# Patient Record
Sex: FEMALE | Race: WHITE | NOT HISPANIC OR LATINO | Employment: OTHER | URBAN - METROPOLITAN AREA
[De-identification: names, ages, dates, MRNs, and addresses within clinical notes are randomized per-mention and may not be internally consistent; named-entity substitution may affect disease eponyms.]

---

## 2022-08-23 ENCOUNTER — HOSPITAL ENCOUNTER (INPATIENT)
Facility: HOSPITAL | Age: 72
LOS: 8 days | Discharge: NURSING, REHAB OR LONG-TERM CARE | DRG: 481 | End: 2022-08-31
Attending: EMERGENCY MEDICINE | Admitting: ORTHOPAEDIC SURGERY
Payer: COMMERCIAL

## 2022-08-23 DIAGNOSIS — T14.90XA TRAUMA: ICD-10-CM

## 2022-08-23 DIAGNOSIS — S72.342A DISPLACED SPIRAL FRACTURE OF SHAFT OF LEFT FEMUR, INITIAL ENCOUNTER FOR CLOSED FRACTURE: ICD-10-CM
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
## 2022-08-23 DIAGNOSIS — S72.25XA CLOSED NONDISPLACED SUBTROCHANTERIC FRACTURE OF LEFT FEMUR, INITIAL ENCOUNTER: Primary | ICD-10-CM

## 2022-08-23 LAB
ALBUMIN SERPL-MCNC: 3.6 GM/DL (ref 3.4–4.8)
ALBUMIN/GLOB SERPL: 1 RATIO (ref 1.1–2)
ALP SERPL-CCNC: 94 UNIT/L (ref 40–150)
ALT SERPL-CCNC: 38 UNIT/L (ref 0–55)
AMPHET UR QL SCN: NEGATIVE
APPEARANCE UR: CLEAR
AST SERPL-CCNC: 41 UNIT/L (ref 5–34)
BACTERIA #/AREA URNS AUTO: NORMAL /HPF
BARBITURATE SCN PRESENT UR: NEGATIVE
BASOPHILS # BLD AUTO: 0.07 X10(3)/MCL (ref 0–0.2)
BASOPHILS NFR BLD AUTO: 0.5 %
BENZODIAZ UR QL SCN: POSITIVE
BILIRUB UR QL STRIP.AUTO: NEGATIVE MG/DL
BILIRUBIN DIRECT+TOT PNL SERPL-MCNC: 0.4 MG/DL
BUN SERPL-MCNC: 9 MG/DL (ref 9.8–20.1)
CALCIUM SERPL-MCNC: 9.5 MG/DL (ref 8.4–10.2)
CANNABINOIDS UR QL SCN: NEGATIVE
CHLORIDE SERPL-SCNC: 95 MMOL/L (ref 98–107)
CO2 SERPL-SCNC: 24 MMOL/L (ref 23–31)
COCAINE UR QL SCN: NEGATIVE
COLOR UR AUTO: YELLOW
CREAT SERPL-MCNC: 0.72 MG/DL (ref 0.55–1.02)
EOSINOPHIL # BLD AUTO: 0.24 X10(3)/MCL (ref 0–0.9)
EOSINOPHIL NFR BLD AUTO: 1.6 %
ERYTHROCYTE [DISTWIDTH] IN BLOOD BY AUTOMATED COUNT: 14 % (ref 11.5–17)
ETHANOL SERPL-MCNC: <10 MG/DL
FENTANYL UR QL SCN: POSITIVE
GFR SERPLBLD CREATININE-BSD FMLA CKD-EPI: >60 MLS/MIN/1.73/M2
GLOBULIN SER-MCNC: 3.5 GM/DL (ref 2.4–3.5)
GLUCOSE SERPL-MCNC: 178 MG/DL (ref 82–115)
GLUCOSE UR QL STRIP.AUTO: NEGATIVE MG/DL
HCT VFR BLD AUTO: 47.3 % (ref 37–47)
HGB BLD-MCNC: 15.2 GM/DL (ref 12–16)
IMM GRANULOCYTES # BLD AUTO: 0.1 X10(3)/MCL (ref 0–0.04)
IMM GRANULOCYTES NFR BLD AUTO: 0.7 %
KETONES UR QL STRIP.AUTO: NEGATIVE MG/DL
LEUKOCYTE ESTERASE UR QL STRIP.AUTO: ABNORMAL UNIT/L
LYMPHOCYTES # BLD AUTO: 3.18 X10(3)/MCL (ref 0.6–4.6)
LYMPHOCYTES NFR BLD AUTO: 21.5 %
MCH RBC QN AUTO: 32.3 PG (ref 27–31)
MCHC RBC AUTO-ENTMCNC: 32.1 MG/DL (ref 33–36)
MCV RBC AUTO: 100.4 FL (ref 80–94)
MDMA UR QL SCN: NEGATIVE
MONOCYTES # BLD AUTO: 0.76 X10(3)/MCL (ref 0.1–1.3)
MONOCYTES NFR BLD AUTO: 5.1 %
NEUTROPHILS # BLD AUTO: 10.4 X10(3)/MCL (ref 2.1–9.2)
NEUTROPHILS NFR BLD AUTO: 70.6 %
NITRITE UR QL STRIP.AUTO: NEGATIVE
NRBC BLD AUTO-RTO: 0 %
OPIATES UR QL SCN: NEGATIVE
PCP UR QL: NEGATIVE
PH UR STRIP.AUTO: 7 [PH]
PH UR: 7 [PH] (ref 3–11)
PLATELET # BLD AUTO: 290 X10(3)/MCL (ref 130–400)
PMV BLD AUTO: 10.4 FL (ref 7.4–10.4)
POTASSIUM SERPL-SCNC: 3.2 MMOL/L (ref 3.5–5.1)
PROT SERPL-MCNC: 7.1 GM/DL (ref 5.8–7.6)
PROT UR QL STRIP.AUTO: NEGATIVE MG/DL
RBC # BLD AUTO: 4.71 X10(6)/MCL (ref 4.2–5.4)
RBC #/AREA URNS AUTO: <5 /HPF
RBC UR QL AUTO: NEGATIVE UNIT/L
SODIUM SERPL-SCNC: 131 MMOL/L (ref 136–145)
SP GR UR STRIP.AUTO: 1.01 (ref 1–1.03)
SPECIFIC GRAVITY, URINE AUTO (.000) (OHS): 1.01 (ref 1–1.03)
SQUAMOUS #/AREA URNS AUTO: <5 /HPF
UROBILINOGEN UR STRIP-ACNC: 0.2 MG/DL
WBC # SPEC AUTO: 14.8 X10(3)/MCL (ref 4.5–11.5)
WBC #/AREA URNS AUTO: <5 /HPF

## 2022-08-23 PROCEDURE — 99223 1ST HOSP IP/OBS HIGH 75: CPT | Mod: ,,, | Performed by: ORTHOPAEDIC SURGERY

## 2022-08-23 PROCEDURE — 81001 URINALYSIS AUTO W/SCOPE: CPT | Performed by: EMERGENCY MEDICINE

## 2022-08-23 PROCEDURE — 25000003 PHARM REV CODE 250: Performed by: ORTHOPAEDIC SURGERY

## 2022-08-23 PROCEDURE — 85025 COMPLETE CBC W/AUTO DIFF WBC: CPT | Performed by: EMERGENCY MEDICINE

## 2022-08-23 PROCEDURE — 11000001 HC ACUTE MED/SURG PRIVATE ROOM

## 2022-08-23 PROCEDURE — 96374 THER/PROPH/DIAG INJ IV PUSH: CPT

## 2022-08-23 PROCEDURE — 63600175 PHARM REV CODE 636 W HCPCS: Performed by: EMERGENCY MEDICINE

## 2022-08-23 PROCEDURE — 25000003 PHARM REV CODE 250: Performed by: PHYSICIAN ASSISTANT

## 2022-08-23 PROCEDURE — 93005 ELECTROCARDIOGRAM TRACING: CPT

## 2022-08-23 PROCEDURE — 25000003 PHARM REV CODE 250: Performed by: EMERGENCY MEDICINE

## 2022-08-23 PROCEDURE — 99285 EMERGENCY DEPT VISIT HI MDM: CPT | Mod: 25

## 2022-08-23 PROCEDURE — 99223 PR INITIAL HOSPITAL CARE,LEVL III: ICD-10-PCS | Mod: ,,, | Performed by: ORTHOPAEDIC SURGERY

## 2022-08-23 PROCEDURE — 93010 EKG 12-LEAD: ICD-10-PCS | Mod: ,,, | Performed by: INTERNAL MEDICINE

## 2022-08-23 PROCEDURE — 82077 ASSAY SPEC XCP UR&BREATH IA: CPT | Performed by: PHYSICIAN ASSISTANT

## 2022-08-23 PROCEDURE — 80307 DRUG TEST PRSMV CHEM ANLYZR: CPT | Performed by: PHYSICIAN ASSISTANT

## 2022-08-23 PROCEDURE — 80053 COMPREHEN METABOLIC PANEL: CPT | Performed by: EMERGENCY MEDICINE

## 2022-08-23 PROCEDURE — 63600175 PHARM REV CODE 636 W HCPCS: Performed by: PHYSICIAN ASSISTANT

## 2022-08-23 PROCEDURE — 36415 COLL VENOUS BLD VENIPUNCTURE: CPT | Performed by: EMERGENCY MEDICINE

## 2022-08-23 PROCEDURE — 93010 ELECTROCARDIOGRAM REPORT: CPT | Mod: ,,, | Performed by: INTERNAL MEDICINE

## 2022-08-23 PROCEDURE — 96361 HYDRATE IV INFUSION ADD-ON: CPT

## 2022-08-23 RX ORDER — THIAMINE HCL 100 MG
100 TABLET ORAL DAILY
Status: DISCONTINUED | OUTPATIENT
Start: 2022-08-24 | End: 2022-08-31 | Stop reason: HOSPADM

## 2022-08-23 RX ORDER — AMLODIPINE BESYLATE 5 MG/1
10 TABLET ORAL DAILY
Status: DISCONTINUED | OUTPATIENT
Start: 2022-08-24 | End: 2022-08-25

## 2022-08-23 RX ORDER — CHLORDIAZEPOXIDE HYDROCHLORIDE 25 MG/1
25 CAPSULE, GELATIN COATED ORAL 2 TIMES DAILY
Status: COMPLETED | OUTPATIENT
Start: 2022-08-24 | End: 2022-08-25

## 2022-08-23 RX ORDER — AMLODIPINE BESYLATE 10 MG/1
10 TABLET ORAL DAILY
Status: ON HOLD | COMMUNITY
End: 2022-09-09 | Stop reason: SDUPTHER

## 2022-08-23 RX ORDER — ESCITALOPRAM OXALATE 10 MG/1
20 TABLET ORAL DAILY
Status: DISCONTINUED | OUTPATIENT
Start: 2022-08-24 | End: 2022-08-31 | Stop reason: HOSPADM

## 2022-08-23 RX ORDER — ALPRAZOLAM 0.5 MG/1
1 TABLET ORAL NIGHTLY PRN
Status: DISCONTINUED | OUTPATIENT
Start: 2022-08-23 | End: 2022-08-31 | Stop reason: HOSPADM

## 2022-08-23 RX ORDER — FOLIC ACID 1 MG/1
1 TABLET ORAL DAILY
Status: DISCONTINUED | OUTPATIENT
Start: 2022-08-24 | End: 2022-08-31 | Stop reason: HOSPADM

## 2022-08-23 RX ORDER — LORAZEPAM 2 MG/ML
1 INJECTION INTRAMUSCULAR
Status: DISCONTINUED | OUTPATIENT
Start: 2022-08-23 | End: 2022-08-31 | Stop reason: HOSPADM

## 2022-08-23 RX ORDER — CHLORDIAZEPOXIDE HYDROCHLORIDE 25 MG/1
25 CAPSULE, GELATIN COATED ORAL 3 TIMES DAILY
Status: DISPENSED | OUTPATIENT
Start: 2022-08-23 | End: 2022-08-24

## 2022-08-23 RX ORDER — MORPHINE SULFATE 4 MG/ML
1 INJECTION, SOLUTION INTRAMUSCULAR; INTRAVENOUS EVERY 6 HOURS PRN
Status: DISCONTINUED | OUTPATIENT
Start: 2022-08-23 | End: 2022-08-24

## 2022-08-23 RX ORDER — ESCITALOPRAM OXALATE 20 MG/1
20 TABLET ORAL DAILY
Status: ON HOLD | COMMUNITY
End: 2022-09-09 | Stop reason: SDUPTHER

## 2022-08-23 RX ORDER — CHLORDIAZEPOXIDE HYDROCHLORIDE 25 MG/1
25 CAPSULE, GELATIN COATED ORAL ONCE
Status: COMPLETED | OUTPATIENT
Start: 2022-08-25 | End: 2022-08-25

## 2022-08-23 RX ORDER — FENTANYL CITRATE 50 UG/ML
50 INJECTION, SOLUTION INTRAMUSCULAR; INTRAVENOUS
Status: COMPLETED | OUTPATIENT
Start: 2022-08-23 | End: 2022-08-23

## 2022-08-23 RX ORDER — LORAZEPAM 2 MG/ML
1 INJECTION INTRAMUSCULAR ONCE
Status: DISCONTINUED | OUTPATIENT
Start: 2022-08-23 | End: 2022-08-23

## 2022-08-23 RX ORDER — SODIUM CHLORIDE, SODIUM LACTATE, POTASSIUM CHLORIDE, CALCIUM CHLORIDE 600; 310; 30; 20 MG/100ML; MG/100ML; MG/100ML; MG/100ML
INJECTION, SOLUTION INTRAVENOUS CONTINUOUS
Status: DISCONTINUED | OUTPATIENT
Start: 2022-08-23 | End: 2022-08-23

## 2022-08-23 RX ORDER — BENAZEPRIL HYDROCHLORIDE AND HYDROCHLOROTHIAZIDE 10; 12.5 MG/1; MG/1
1 TABLET ORAL DAILY
Status: ON HOLD | COMMUNITY
End: 2022-09-09 | Stop reason: SDUPTHER

## 2022-08-23 RX ORDER — POTASSIUM CHLORIDE 14.9 MG/ML
20 INJECTION INTRAVENOUS ONCE
Status: COMPLETED | OUTPATIENT
Start: 2022-08-23 | End: 2022-08-23

## 2022-08-23 RX ORDER — ALPRAZOLAM 1 MG/1
TABLET, EXTENDED RELEASE ORAL DAILY PRN
Status: ON HOLD | COMMUNITY
End: 2022-09-09 | Stop reason: SDUPTHER

## 2022-08-23 RX ORDER — HYDROCODONE BITARTRATE AND ACETAMINOPHEN 5; 325 MG/1; MG/1
1 TABLET ORAL EVERY 6 HOURS PRN
Status: DISCONTINUED | OUTPATIENT
Start: 2022-08-23 | End: 2022-08-24

## 2022-08-23 RX ADMIN — HYDROCODONE BITARTRATE AND ACETAMINOPHEN 1 TABLET: 5; 325 TABLET ORAL at 09:08

## 2022-08-23 RX ADMIN — FOLIC ACID: 5 INJECTION, SOLUTION INTRAMUSCULAR; INTRAVENOUS; SUBCUTANEOUS at 03:08

## 2022-08-23 RX ADMIN — POTASSIUM CHLORIDE 20 MEQ: 14.9 INJECTION, SOLUTION INTRAVENOUS at 03:08

## 2022-08-23 RX ADMIN — MORPHINE SULFATE 1 MG: 4 INJECTION INTRAVENOUS at 04:08

## 2022-08-23 RX ADMIN — SODIUM CHLORIDE 1000 ML: 9 INJECTION, SOLUTION INTRAVENOUS at 12:08

## 2022-08-23 RX ADMIN — CHLORDIAZEPOXIDE HYDROCHLORIDE 25 MG: 25 CAPSULE ORAL at 03:08

## 2022-08-23 RX ADMIN — CHLORDIAZEPOXIDE HYDROCHLORIDE 25 MG: 25 CAPSULE ORAL at 09:08

## 2022-08-23 RX ADMIN — FENTANYL CITRATE 50 MCG: 50 INJECTION, SOLUTION INTRAMUSCULAR; INTRAVENOUS at 01:08

## 2022-08-23 NOTE — H&P
.  [facility]  Orthopedics  Consult Note    Patient Name: Mary Lou Middleton  MRN: [id number]  Admission Date: 8/23/2022  Hospital Length of Stay: 0 days  Attending Provider: Maicol Spain Jr., MD  Primary Care Provider: Rosalinda Torres NP        Consults  Subjective:     Principal Problem:<principal problem not specified>    Chief Complaint:   Chief Complaint   Patient presents with    Fall     Pt to ER via [facility] for fall.  Down 4 stairs.  Left femur deformity.  -LOC, -thinners, GCS 15        HPI: She is a pleasant 72yo who presents with left leg pain after a fall at home. She fell down stairs outside. She was able to get inside and call for an ambulance. She complains of only left leg pain. It is worse with any activity. It is somewhat better with rest. Orthopedics admitted for management of her left femur fracture.       Past Medical History:   Diagnosis Date    Hypertension        History reviewed. No pertinent surgical history.    Review of patient's allergies indicates:  No Known Allergies    Current Facility-Administered Medications   Medication    chlordiazepoxide capsule 25 mg    [START ON 8/24/2022] chlordiazepoxide capsule 25 mg    [START ON 8/25/2022] chlordiazepoxide capsule 25 mg    [START ON 8/24/2022] folic acid tablet 1 mg    lorazepam injection 1 mg    morphine injection 1 mg    [START ON 8/24/2022] multivitamin tablet    [START ON 8/24/2022] thiamine tablet 100 mg     Family History    None       Tobacco Use    Smoking status: Not on file    Smokeless tobacco: Not on file   Substance and Sexual Activity    Alcohol use: Not on file    Drug use: Not on file    Sexual activity: Not on file     ROS  Objective:     Vital Signs (Most Recent):  Temp: 98.6 °F (37 °C) (08/23/22 1801)  Pulse: 78 (08/23/22 1801)  Resp: (!) 22 (08/23/22 1801)  BP: (!) 165/78 (08/23/22 1801)  SpO2: 99 % (08/23/22 1801) Vital Signs (24h Range):  Temp:  [98.1 °F (36.7 °C)-98.6 °F (37 °C)]  "98.6 °F (37 °C)  Pulse:  [74-91] 78  Resp:  [12-26] 22  SpO2:  [97 %-100 %] 99 %  BP: (133-165)/(56-86) 165/78     Weight: 72.6 kg (160 lb)  Height: 5' 5" (165.1 cm)  Body mass index is 26.63 kg/m².    No intake or output data in the 24 hours ending 08/23/22 1855    Ortho/SPM Exam  Musculoskeletal:   Neck: no pain with range of motion, no tenderness to palpation  Right upper extremity: no swelling; no deformity; no open wounds; compartments are soft and compressible; no pain with ROM at the shoulder, elbow, wrist, or digits, no tenderness to palpation; AIN/PIN/Ulnar motor intact; SILT distally;BCR distally; Radial pulse 2+  Left upper extremity: no swelling; no deformity; no open wounds; compartments are soft and compressible; no pain with ROM at the shoulder, elbow, wrist, or digits, no tenderness to palpation; AIN/PIN/Ulnar motor intact; SILT distally;BCR distally; Radial pulse 2+  Right lower extremity: no swelling; no deformity; no open wounds; compartments are soft and compressible; No pain with ROM at the hip, knee, or ankle; no tenderness to palpation; dorsi/plantar flexes the foot; SILT distally; BCR distally; DP pulse 2+  Left lower extremity: knee immobilizer in place. Compartments soft, compressible. SILT. +2 DP pulse. Motor intact.      Significant Labs: All pertinent labs within the past 24 hours have been reviewed.  None  Recent Lab Results       08/23/22  1516   08/23/22  1515   08/23/22  1308        Phencyclidine   Negative         Albumin/Globulin Ratio     1.0       Albumin     3.6       Alcohol, Serum     <10.0       Alkaline Phosphatase     94       ALT     38       Amphetamine Screen, Ur   Negative         Appearance, UA Clear           AST     41       Bacteria, UA None Seen           Barbiturate Screen, Ur   Negative         Baso #     0.07       Basophil %     0.5       Benzodiazepine Screen, Urine   Positive         BILIRUBIN TOTAL     0.4       Bilirubin, UA Negative           BUN     9.0   "     Calcium     9.5       Cannabinoids, Urine   Negative         Chloride     95       CO2     24       Cocaine (Metab.)   Negative         Color, UA Yellow           Creatinine     0.72       eGFR     >60       Eos #     0.24       Eosinophil %     1.6       Fentanyl, Urine   Positive         Globulin, Total     3.5       Glucose     178       Glucose, UA Negative           Hematocrit     47.3       Hemoglobin     15.2       Immature Grans (Abs)     0.10       Immature Granulocytes     0.7       Ketones, UA Negative           Leukocytes, UA Trace           Lymph #     3.18       LYMPH %     21.5       MCH     32.3       MCHC     32.1       MCV     100.4       MDMA, Urine   Negative         Mono #     0.76       Mono %     5.1       MPV     10.4       Neut #     10.4       Neut %     70.6       NITRITE UA Negative           nRBC     0.0       Occult Blood UA Negative           Opiate Scrn, Ur   Negative         pH, UA 7.0           pH, Urine   7.0         Platelets     290       Potassium     3.2       PROTEIN TOTAL     7.1       Protein, UA Negative           RBC     4.71       RBC, UA <5           RDW     14.0       Sodium     131       Specific Gravity,UA 1.010           Specific Gravity, Urine Auto   1.010         Squam Epithel, UA <5           Urobilinogen, UA 0.2           WBC, UA <5           WBC     14.8              Significant Imaging:   Result Date: 8/23/2022  EXAMINATION: XR FEMUR 2 VIEW LEFT CLINICAL HISTORY: Unspecified fall, initial encounter TECHNIQUE: AP and lateral views of the left femur COMPARISON: None} FINDINGS: Spiral comminuted fracture of the mid to distal left femoral shaft.  Distal shaft is displaced posteromedially by about 1 shaft width.  Left hip and knee grossly aligned.      Comminuted left femoral shaft fracture.    Assessment/Plan:     Left midshaft femur fracture with nondisplaced extension into the supracondylar region    I have recommended surgical intervention:  Intramedullary  nailing/over reduction of excision of the left femur.  We discussed the details of the procedure and expected postoperative course.  We discussed the benefits of surgery which be to stabilize the fracture.  We discussed the risks of surgery which are small but could be significant if she has a malunion, nonunion, pain, stiffness, or infection.  After discussion she would like to proceed.  Plans for surgery 08/24/2022 with myself or with Dr. Vaughn pending earlier OR availability.            Maicol Spain Jr, MD  Orthopedic Surgery and Sports Medicine  [facility]

## 2022-08-23 NOTE — CONSULTS
[facility]  Orthopedics  Consult Note    Patient Name: Mary Lou Middleton  MRN: [id number]  Admission Date: (Not on file)  Hospital Length of Stay: 0 days  Attending Provider: No att. providers found  Primary Care Provider: Rosalinda Torres NP        Consults  Subjective:         Chief Complaint: left leg pain     HPI: She is a pleasant 70yo who presents with left leg pain after a fall at home. She fell down stairs outside. She was able to get inside and call for an ambulance. She complains of only left leg pain. It is worse with any activity. It is somewhat better with rest. Orthopedics was consulted for management of her left femur fracture.    Past Medical History:   Diagnosis Date    Hypertension        No past surgical history on file.    Review of patient's allergies indicates:  No Known Allergies    No current facility-administered medications for this visit.     No current outpatient medications on file.     Facility-Administered Medications Ordered in Other Visits   Medication    chlordiazepoxide capsule 25 mg    [START ON 8/24/2022] chlordiazepoxide capsule 25 mg    [START ON 8/25/2022] chlordiazepoxide capsule 25 mg    [START ON 8/24/2022] folic acid tablet 1 mg    lorazepam injection 1 mg    morphine injection 1 mg    [START ON 8/24/2022] multivitamin tablet    [START ON 8/24/2022] thiamine tablet 100 mg     Family History    None       Tobacco Use    Smoking status: Not on file    Smokeless tobacco: Not on file   Substance and Sexual Activity    Alcohol use: Not on file    Drug use: Not on file    Sexual activity: Not on file     ROS  Objective:     Vital Signs (Most Recent):    Vital Signs (24h Range):  [unfilled]           There is no height or weight on file to calculate BMI.    [unfilled]    Ortho/SPM Exam  Musculoskeletal:     Left lower extremity: knee immobilizer in place. Compartments soft, compressible. SILT. +2 DP pulse. Motor intact.      Significant Labs: All  pertinent labs within the past 24 hours have been reviewed.  Recent Lab Results         08/23/22  1308        Albumin/Globulin Ratio 1.0       Albumin 3.6       Alcohol, Serum <10.0       Alkaline Phosphatase 94       ALT 38       AST 41       Baso # 0.07       Basophil % 0.5       BILIRUBIN TOTAL 0.4       BUN 9.0       Calcium 9.5       Chloride 95       CO2 24       Creatinine 0.72       eGFR >60       Eos # 0.24       Eosinophil % 1.6       Globulin, Total 3.5       Glucose 178       Hematocrit 47.3       Hemoglobin 15.2       Immature Grans (Abs) 0.10       Immature Granulocytes 0.7       Lymph # 3.18       LYMPH % 21.5       MCH 32.3       MCHC 32.1       .4       Mono # 0.76       Mono % 5.1       MPV 10.4       Neut # 10.4       Neut % 70.6       nRBC 0.0       Platelets 290       Potassium 3.2       PROTEIN TOTAL 7.1       RBC 4.71       RDW 14.0       Sodium 131       WBC 14.8                Significant Imaging: I have reviewed and interpreted all pertinent imaging results/findings.  X-Ray Chest 1 View    Result Date: 8/23/2022  EXAMINATION: XR CHEST 1 VIEW CLINICAL HISTORY: Injury, unspecified, initial encounter TECHNIQUE: Single view of the chest COMPARISON: No prior imaging available for comparison. FINDINGS: No focal opacification, pleural effusion, or pneumothorax. The cardiomediastinal silhouette is within normal limits. No acute osseous abnormality.     No acute cardiopulmonary process. Electronically signed by: Jamshid Matos Date:    08/23/2022 Time:    13:36    X-Ray Femur Ap/Lat Left    Result Date: 8/23/2022  EXAMINATION: XR FEMUR 2 VIEW LEFT CLINICAL HISTORY: Unspecified fall, initial encounter TECHNIQUE: AP and lateral views of the left femur COMPARISON: None} FINDINGS: Spiral comminuted fracture of the mid to distal left femoral shaft.  Distal shaft is displaced posteromedially by about 1 shaft width.  Left hip and knee grossly aligned.     Comminuted left femoral shaft fracture.  Electronically signed by: Saad Wagoner Date:    08/23/2022 Time:    14:25    CT Knee Without Contrast Left    Result Date: 8/23/2022  EXAMINATION: CT KNEE WITHOUT CONTRAST LEFT CLINICAL HISTORY: Fracture, knee; TECHNIQUE: Helical acquisition through the left knee without IV contrast three plane reconstructions were provided for review.  mGycm. Automatic exposure control, adjustment of mA/kV or iterative reconstruction technique was used to reduce radiation. COMPARISON: Radiographs earlier today FINDINGS: There is partially imaged comminuted and displaced fracture of the distal femoral metadiaphysis.  Nondisplaced portion of the fracture may extend in the far anteromedial aspect of the knee joint on image 17 series 12.  The patella, tibia and fibula are intact.  There are degenerative changes of the knee with osteophytosis and mild joint space narrowing in the medial compartment.  There is a small joint effusion.     As above. Electronically signed by: Reg Rodriges Date:    08/23/2022 Time:    17:10       Assessment/Plan:   Left midshaft femur fracture with nondisplaced extension into the supracondylar region    I have recommended surgical intervention:  Intramedullary nailing/over reduction of excision of the left femur.  We discussed the details of the procedure and expected postoperative course.  We discussed the benefits of surgery which be to stabilize the fracture.  We discussed the risks of surgery which are small but could be significant if she has a malunion, nonunion, pain, stiffness, or infection.  After discussion she would like to proceed.  Plans for surgery 08/24/2022 with myself or with Dr. Vaughn pending earlier OR availability.

## 2022-08-23 NOTE — ED TRIAGE NOTES
(Pt to ER via [facility] for fall. Down 4 stairs. Left femur deformity, shortened, place in knee immobilizer   -LOC, -thinners, GCS 15

## 2022-08-23 NOTE — ED PROVIDER NOTES
Encounter Date: 8/23/2022    SCRIBE #1 NOTE: I, Stephanie You, am scribing for, and in the presence of,  Leonidas Hadley III, MD . I have scribed the following portions of the note - Other sections scribed: HPI, ROS, PE.       History     Chief Complaint   Patient presents with    Fall     Pt to ER via [facility] for fall.  Down 4 stairs.  Left femur deformity.  -LOC, -thinners, GCS 15     71 year old female with a history of HTN presents to ED, via EMS, after a fall.  Pt reports slipping on wet steps and is complaining of left lateral thigh pain.  Pt denies any HA, neck pain, CP, abdominal pain, or N/V/D.  Pt is not on blood thinners.  No loss of conscious no preceding event      Fall  The accident occurred just prior to arrival. The fall occurred while walking. She fell from a height of 3 to 5 ft. She landed on concrete. There was no blood loss. She was not ambulatory at the scene. Pertinent negatives include no neck pain, no back pain, no fever, no abdominal pain, no nausea, no vomiting and no loss of consciousness.     Review of patient's allergies indicates:  No Known Allergies  Past Medical History:   Diagnosis Date    Hypertension      History reviewed. No pertinent surgical history.  History reviewed. No pertinent family history.     Review of Systems   Constitutional: Negative for fatigue, fever and unexpected weight change.   HENT: Negative for congestion and rhinorrhea.    Eyes: Negative for pain.   Respiratory: Negative for chest tightness, shortness of breath and wheezing.    Cardiovascular: Negative for chest pain.   Gastrointestinal: Negative for abdominal pain, constipation, diarrhea, nausea and vomiting.   Genitourinary: Negative for dysuria.   Musculoskeletal: Negative for back pain and neck pain.        Left lateral thigh pain   Skin: Negative for rash.   Allergic/Immunologic: Negative for environmental allergies, food allergies and immunocompromised state.   Neurological: Negative for dizziness,  loss of consciousness and speech difficulty.   Hematological: Does not bruise/bleed easily.   Psychiatric/Behavioral: Negative for sleep disturbance and suicidal ideas.       Physical Exam     Initial Vitals [08/23/22 1225]   BP Pulse Resp Temp SpO2   (!) 145/56 90 12 98.1 °F (36.7 °C) 99 %      MAP       --         Physical Exam    Nursing note and vitals reviewed.  Constitutional: No distress.   HENT:   Head: Normocephalic and atraumatic.   Neck: Trachea normal.   Cardiovascular: Normal rate and regular rhythm.   No murmur heard.  Pulmonary/Chest: Breath sounds normal. No respiratory distress.   Abdominal: Abdomen is soft. Bowel sounds are normal. She exhibits no distension. There is no abdominal tenderness.   Musculoskeletal:         General: Normal range of motion.      Lumbar back: Normal range of motion.      Comments: Left lateral thigh ecchymosis; wrapped in an ace wrap; neurovascularly intact     Neurological: She is alert and oriented to person, place, and time. She has normal strength. No cranial nerve deficit.   Skin: Skin is warm and dry. No rash noted.   Psychiatric: She has a normal mood and affect. Judgment normal.         ED Course   Procedures  Labs Reviewed   COMPREHENSIVE METABOLIC PANEL - Abnormal; Notable for the following components:       Result Value    Sodium Level 131 (*)     Potassium Level 3.2 (*)     Chloride 95 (*)     Glucose Level 178 (*)     Blood Urea Nitrogen 9.0 (*)     Albumin/Globulin Ratio 1.0 (*)     Aspartate Aminotransferase 41 (*)     All other components within normal limits   CBC WITH DIFFERENTIAL - Abnormal; Notable for the following components:    WBC 14.8 (*)     Hct 47.3 (*)     .4 (*)     MCH 32.3 (*)     MCHC 32.1 (*)     Neut # 10.4 (*)     IG# 0.10 (*)     All other components within normal limits   CBC W/ AUTO DIFFERENTIAL    Narrative:     The following orders were created for panel order CBC Auto Differential.  Procedure                                Abnormality         Status                     ---------                               -----------         ------                     CBC with Differential[[id number]]        Abnormal            Final result                 Please view results for these tests on the individual orders.   URINALYSIS, REFLEX TO URINE CULTURE   ALCOHOL,MEDICAL (ETHANOL)   DRUG SCREEN, URINE (BEAKER)     EKG Readings: (Independently Interpreted)   Rhythm: Normal Sinus Rhythm. Heart Rate: 81. Ectopy: No Ectopy. Conduction: Normal. ST Segments: Non-Specific ST Segment Depression. T Waves: Normal. Clinical Impression: Normal Sinus Rhythm   1302       Imaging Results          X-Ray Femur Ap/Lat Left (Final result)  Result time 08/23/22 14:25:41    Final result by Saad Wagoner MD (08/23/22 14:25:41)                 Impression:      Comminuted left femoral shaft fracture.      Electronically signed by: Saad Wagoner  Date:    08/23/2022  Time:    14:25             Narrative:    EXAMINATION:  XR FEMUR 2 VIEW LEFT    CLINICAL HISTORY:  Unspecified fall, initial encounter    TECHNIQUE:  AP and lateral views of the left femur    COMPARISON:  None}    FINDINGS:  Spiral comminuted fracture of the mid to distal left femoral shaft.  Distal shaft is displaced posteromedially by about 1 shaft width.  Left hip and knee grossly aligned.                               X-Ray Chest 1 View (Final result)  Result time 08/23/22 13:36:42    Final result by Jamshid Matos MD (08/23/22 13:36:42)                 Impression:      No acute cardiopulmonary process.      Electronically signed by: Jamshid Matos  Date:    08/23/2022  Time:    13:36             Narrative:    EXAMINATION:  XR CHEST 1 VIEW    CLINICAL HISTORY:  Injury, unspecified, initial encounter    TECHNIQUE:  Single view of the chest    COMPARISON:  No prior imaging available for comparison.    FINDINGS:  No focal opacification, pleural effusion, or pneumothorax.    The cardiomediastinal  silhouette is within normal limits.    No acute osseous abnormality.                                 Medications   potassium chloride 20 mEq in 100 mL IVPB (FOR CENTRAL LINE ADMINISTRATION ONLY) (has no administration in time range)   morphine injection 1 mg (has no administration in time range)   lorazepam injection 1 mg (has no administration in time range)   sodium chloride 0.9% 1,000 mL with mvi, adult no.4 with vit K 3,300 unit- 150 mcg/10 mL 10 mL, thiamine 100 mg, folic acid 1 mg infusion (has no administration in time range)   multivitamin tablet (has no administration in time range)   folic acid tablet 1 mg (has no administration in time range)   thiamine tablet 100 mg (has no administration in time range)   sodium chloride 0.9% bolus 1,000 mL (0 mLs Intravenous Stopped 8/23/22 1345)   fentaNYL injection 50 mcg (50 mcg Intravenous Given 8/23/22 1330)     Medical Decision Making:   Clinical Tests:   Lab Tests: Ordered and Reviewed  Radiological Study: Reviewed and Ordered  ED Management:  Midshaft left femur fracture while maintaining traction in knee immobilizer was placed around the thigh for stabilization neurovascular intact            Attending Attestation:           Physician Attestation for Scribe:  Physician Attestation Statement for Scribe #1: I, Leonidas Hadley III, MD , reviewed documentation, as scribed by Stephanie You in my presence, and it is both accurate and complete.                      Clinical Impression:   Final diagnoses:  [W19.XXXA] Fall  [T14.90XA] Trauma  [S72.25XA] Closed nondisplaced subtrochanteric fracture of left femur, initial encounter (Primary)          ED Disposition Condition    Admit               Leonidas Hadley III, MD  08/23/22 7260

## 2022-08-23 NOTE — CONSULTS
"[facility]  Hospital Medicine Consultation Note      Patient Name: Mary Lou Middleton  MRN: [id number]  Patient Class: IP- Inpatient   Admission Date: 8/23/2022   Admitting Physician: Dr. Spain  Length of Stay: 0  Primary Care Provider: Rosalinda Torres NP  Consulting physician: Dr. Urbina  Face-to-Face encounter date: 08/23/2022  Code Status:  DNR  Chief Complaint: Fall  Reason for consultation:  Medical management      Patient information was obtained from patient, patient's family, past medical records and ER records.     HISTORY OF PRESENT ILLNESS:   Mary Lou Middleton is a 71 y.o. female with a past medical history of hypertension and anxiety who presented to [facility] on 8/23/2022 via EMS with complaints of left lower extremity mostly in the lateral thigh that occurred after she slipped and fell down her wet stairs which is comprised of five steps. She was home alone during incident, but was able to "scoot" herself to a phone in order to call EMS.  She denied head injury or loss of consciousness. Upon arrival in the ED, she underwent an x-ray of her left femur that showed a midshaft fracture.  She was admitted to Orthopedic Services for likely surgical intervention. Hospital Medicine was consulted for medical management. She admits to smoking a pack of cigarettes daily and also drinks x3-4 glasses of liquor daily with the last consumption around 2000 last night on 08/22/2022.  She denies any previous history of alcohol withdrawal.  I spoke to the patient about code status extensively and she requested to be a DNR.    Her vital signs are currently stable though she was hypertensive /70.  Her labs showed a WBC 14.8, a macrocytic anemia with a stable H&H, sodium 131, potassium 3.2, chloride 95, and glucose 178. CXR showed no acute findings.  PAST MEDICAL HISTORY:   Hypertension   Anxiety/depression    PAST SURGICAL HISTORY:   Tonsillectomy    ALLERGIES:   Patient has no known " allergies.    FAMILY HISTORY:   Reviewed and negative    SOCIAL HISTORY:   Tobacco-a pack of cigarettes daily  Alcohol-x 3-4 glasses of liquor daily  Illicit Drugs- none  HOME MEDICATIONS:     Prior to Admission medications    Not on File       REVIEW OF SYSTEMS:   Except as documented, all other systems reviewed and negative     PHYSICAL EXAM:     VITAL SIGNS: 24 HRS MIN & MAX LAST   Temp  Min: 98.1 °F (36.7 °C)  Max: 98.1 °F (36.7 °C) 98.1 °F (36.7 °C)   BP  Min: 133/74  Max: 160/70 133/74   Pulse  Min: 77  Max: 91  78   Resp  Min: 12  Max: 26 (!) 25   SpO2  Min: 99 %  Max: 100 % 100 %   Vital signs reviewed.    General: well-developed well-nourished in no acute distress  Eye: clear conjunctiva, eyelids normal  HENT: oropharynx and nasal mucosal surfaces moist  Neck: full range of motion  Respiratory: clear to auscultation bilaterally  Cardiovascular: regular rate and rhythm without murmurs, gallops or rubs  Gastrointestinal: soft, non-tender, non-distended with normal bowel sounds, without masses to palpation  Musculoskeletal: FROM of LUE, RUE, and RLE with full strength, limited examination of LLE since it is currently in a knee immobilizer  Integumentary: warm, dry  Neurologic: cranial nerves intact    LABS AND IMAGING:     Recent Labs   Lab 08/23/22  1308   WBC 14.8*   RBC 4.71   HGB 15.2   HCT 47.3*   .4*   MCH 32.3*   MCHC 32.1*   RDW 14.0      MPV 10.4       Recent Labs   Lab 08/23/22  1308   *   K 3.2*   CO2 24   BUN 9.0*   CREATININE 0.72   CALCIUM 9.5   ALBUMIN 3.6   ALKPHOS 94   ALT 38   AST 41*   BILITOT 0.4       Microbiology Results (last 7 days)     ** No results found for the last 168 hours. **           X-Ray Femur Ap/Lat Left  Narrative: EXAMINATION:  XR FEMUR 2 VIEW LEFT    CLINICAL HISTORY:  Unspecified fall, initial encounter    TECHNIQUE:  AP and lateral views of the left femur    COMPARISON:  None}    FINDINGS:  Spiral comminuted fracture of the mid to distal left femoral  shaft.  Distal shaft is displaced posteromedially by about 1 shaft width.  Left hip and knee grossly aligned.  Impression: Comminuted left femoral shaft fracture.    Electronically signed by: Saad Wagoner  Date:    08/23/2022  Time:    14:25  X-Ray Chest 1 View  Narrative: EXAMINATION:  XR CHEST 1 VIEW    CLINICAL HISTORY:  Injury, unspecified, initial encounter    TECHNIQUE:  Single view of the chest    COMPARISON:  No prior imaging available for comparison.    FINDINGS:  No focal opacification, pleural effusion, or pneumothorax.    The cardiomediastinal silhouette is within normal limits.    No acute osseous abnormality.  Impression: No acute cardiopulmonary process.    Electronically signed by: Jamshid Matos  Date:    08/23/2022  Time:    13:36        ASSESSMENT & PLAN:   Comminuted left femoral shaft fracture  Hypokalemia/hyponatremia  Alcohol abuse   Tobacco abuse  History of hypertension   History of anxiety    Plan:  -orthopedics are the primary so continue with their recommendations  -keep npo for now until evaluated by orthopedics for possible surgery today versus tomorrow  -oxygen supplementation as needed  -control pain and nausea PRN  -x-ray left femur currently pending  -UA, ethanol, UDS-pending  -KCL 20 mEq x1 dose  -continue with IV hydration-gave a banana bag x1 for history of alcohol abuse  -continue with thiamine, folate, and multivitamin daily tomorrow  -Librium protocol  -Ativan 1mg IV PRN for symptoms of alcohol withdrawal  -CIWA  Q4 hour  -Repeat labs in am    VTE Prophylaxis: will be placed on SCDs for DVT prophylaxis and will be advised to be as mobile as possible and sit in a chair as tolerated  __________________________________________________________________________  INPATIENT LIST OF MEDICATIONS     Current Facility-Administered Medications:     [START ON 8/24/2022] folic acid tablet 1 mg, 1 mg, Oral, Daily, SHAYNE Hill    lorazepam injection 1 mg, 1 mg, Intravenous, PRN,  SHAYNE Hill    morphine injection 1 mg, 1 mg, Intravenous, Q6H PRN, SHAYNE Hill    [START ON 8/24/2022] multivitamin tablet, 1 tablet, Oral, Daily, SHAYNE Hill    potassium chloride 20 mEq in 100 mL IVPB (FOR CENTRAL LINE ADMINISTRATION ONLY), 20 mEq, Intravenous, Once, SHAYNE Hill    sodium chloride 0.9% 1,000 mL with mvi, adult no.4 with vit K 3,300 unit- 150 mcg/10 mL 10 mL, thiamine 100 mg, folic acid 1 mg infusion, , Intravenous, Once, SHAYNE Hlil    [START ON 8/24/2022] thiamine tablet 100 mg, 100 mg, Oral, Daily, SHAYNE Hill  No current outpatient medications on file.      Scheduled Meds:  Continuous Infusions:  PRN Meds:.      Discharge Planning and Disposition/Anticipated discharge: Hong KLEIN, NP/PA have reviewed and discussed the case with Dr. Urbina.   Please see the following addendum for further assessment and plan from there attending MD.  08/23/2022    ________________________________________________________________________________    MD Addendum:  Carol AGUILAR assumed care of this patient today at --am/pm  For the patient encounter, I performed the substantive portion of the visit, I reviewed the NP/PA documentation, treatment plan, and medical decision making.  I had face to face time with this patient     A. History:    B. Physical exam:    C. Medical decision making:      All diagnosis and differential diagnosis have been reviewed; assessment and plan has been documented; I have personally reviewed the labs and test results that are presently available; I have reviewed the patients medication list; I have reviewed the consulting providers response and recommendations. I have reviewed or attempted to review medical records based upon their availability.    All of the patient and family questions have been addressed and answered. Patient's is agreeable to the above stated plan. I will continue to monitor closely and make adjustments to medical  management as needed.    SHAYNE Hill   08/23/2022

## 2022-08-24 ENCOUNTER — ANESTHESIA (OUTPATIENT)
Dept: SURGERY | Facility: HOSPITAL | Age: 72
DRG: 481 | End: 2022-08-24
Payer: COMMERCIAL

## 2022-08-24 ENCOUNTER — ANESTHESIA EVENT (OUTPATIENT)
Dept: SURGERY | Facility: HOSPITAL | Age: 72
DRG: 481 | End: 2022-08-24
Payer: COMMERCIAL

## 2022-08-24 PROBLEM — S72.342A: Status: ACTIVE | Noted: 2022-08-24

## 2022-08-24 LAB
ALBUMIN SERPL-MCNC: 2.9 GM/DL (ref 3.4–4.8)
ALBUMIN/GLOB SERPL: 1 RATIO (ref 1.1–2)
ALP SERPL-CCNC: 75 UNIT/L (ref 40–150)
ALT SERPL-CCNC: 29 UNIT/L (ref 0–55)
AST SERPL-CCNC: 31 UNIT/L (ref 5–34)
BASOPHILS # BLD AUTO: 0.04 X10(3)/MCL (ref 0–0.2)
BASOPHILS NFR BLD AUTO: 0.4 %
BILIRUBIN DIRECT+TOT PNL SERPL-MCNC: 0.6 MG/DL
BUN SERPL-MCNC: 5.7 MG/DL (ref 9.8–20.1)
CALCIUM SERPL-MCNC: 9.1 MG/DL (ref 8.4–10.2)
CHLORIDE SERPL-SCNC: 103 MMOL/L (ref 98–107)
CO2 SERPL-SCNC: 30 MMOL/L (ref 23–31)
CREAT SERPL-MCNC: 0.66 MG/DL (ref 0.55–1.02)
EOSINOPHIL # BLD AUTO: 0.09 X10(3)/MCL (ref 0–0.9)
EOSINOPHIL NFR BLD AUTO: 0.9 %
ERYTHROCYTE [DISTWIDTH] IN BLOOD BY AUTOMATED COUNT: 14.1 % (ref 11.5–17)
GFR SERPLBLD CREATININE-BSD FMLA CKD-EPI: >60 MLS/MIN/1.73/M2
GLOBULIN SER-MCNC: 2.9 GM/DL (ref 2.4–3.5)
GLUCOSE SERPL-MCNC: 97 MG/DL (ref 82–115)
HCT VFR BLD AUTO: 41.3 % (ref 37–47)
HGB BLD-MCNC: 13.4 GM/DL (ref 12–16)
IMM GRANULOCYTES # BLD AUTO: 0.04 X10(3)/MCL (ref 0–0.04)
IMM GRANULOCYTES NFR BLD AUTO: 0.4 %
LYMPHOCYTES # BLD AUTO: 3.06 X10(3)/MCL (ref 0.6–4.6)
LYMPHOCYTES NFR BLD AUTO: 29.2 %
MCH RBC QN AUTO: 32.5 PG (ref 27–31)
MCHC RBC AUTO-ENTMCNC: 32.4 MG/DL (ref 33–36)
MCV RBC AUTO: 100.2 FL (ref 80–94)
MONOCYTES # BLD AUTO: 0.89 X10(3)/MCL (ref 0.1–1.3)
MONOCYTES NFR BLD AUTO: 8.5 %
NEUTROPHILS # BLD AUTO: 6.4 X10(3)/MCL (ref 2.1–9.2)
NEUTROPHILS NFR BLD AUTO: 60.6 %
NRBC BLD AUTO-RTO: 0 %
PLATELET # BLD AUTO: 256 X10(3)/MCL (ref 130–400)
PMV BLD AUTO: 10.5 FL (ref 7.4–10.4)
POTASSIUM SERPL-SCNC: 4 MMOL/L (ref 3.5–5.1)
PROT SERPL-MCNC: 5.8 GM/DL (ref 5.8–7.6)
RBC # BLD AUTO: 4.12 X10(6)/MCL (ref 4.2–5.4)
SODIUM SERPL-SCNC: 139 MMOL/L (ref 136–145)
WBC # SPEC AUTO: 10.5 X10(3)/MCL (ref 4.5–11.5)

## 2022-08-24 PROCEDURE — 27506 TREATMENT OF THIGH FRACTURE: CPT | Mod: LT,,, | Performed by: ORTHOPAEDIC SURGERY

## 2022-08-24 PROCEDURE — 37000009 HC ANESTHESIA EA ADD 15 MINS: Performed by: ORTHOPAEDIC SURGERY

## 2022-08-24 PROCEDURE — 27506 TREATMENT OF THIGH FRACTURE: CPT | Mod: AS,LT,, | Performed by: NURSE PRACTITIONER

## 2022-08-24 PROCEDURE — 25000003 PHARM REV CODE 250: Performed by: INTERNAL MEDICINE

## 2022-08-24 PROCEDURE — 36415 COLL VENOUS BLD VENIPUNCTURE: CPT | Performed by: PHYSICIAN ASSISTANT

## 2022-08-24 PROCEDURE — 25000003 PHARM REV CODE 250: Performed by: ORTHOPAEDIC SURGERY

## 2022-08-24 PROCEDURE — 27800903 OPTIME MED/SURG SUP & DEVICES OTHER IMPLANTS: Performed by: ORTHOPAEDIC SURGERY

## 2022-08-24 PROCEDURE — 11000001 HC ACUTE MED/SURG PRIVATE ROOM

## 2022-08-24 PROCEDURE — 36000711: Performed by: ORTHOPAEDIC SURGERY

## 2022-08-24 PROCEDURE — 25000003 PHARM REV CODE 250: Performed by: NURSE PRACTITIONER

## 2022-08-24 PROCEDURE — 25000003 PHARM REV CODE 250: Performed by: ANESTHESIOLOGY

## 2022-08-24 PROCEDURE — 97165 OT EVAL LOW COMPLEX 30 MIN: CPT

## 2022-08-24 PROCEDURE — 25000003 PHARM REV CODE 250: Performed by: NURSE ANESTHETIST, CERTIFIED REGISTERED

## 2022-08-24 PROCEDURE — 27000221 HC OXYGEN, UP TO 24 HOURS

## 2022-08-24 PROCEDURE — 97162 PT EVAL MOD COMPLEX 30 MIN: CPT

## 2022-08-24 PROCEDURE — 27506 PR OPEN RX FEMUR FX+INTRAMED ROD: ICD-10-PCS | Mod: AS,LT,, | Performed by: NURSE PRACTITIONER

## 2022-08-24 PROCEDURE — 25000003 PHARM REV CODE 250: Performed by: EMERGENCY MEDICINE

## 2022-08-24 PROCEDURE — 27201423 OPTIME MED/SURG SUP & DEVICES STERILE SUPPLY: Performed by: ORTHOPAEDIC SURGERY

## 2022-08-24 PROCEDURE — 37000008 HC ANESTHESIA 1ST 15 MINUTES: Performed by: ORTHOPAEDIC SURGERY

## 2022-08-24 PROCEDURE — 36000710: Performed by: ORTHOPAEDIC SURGERY

## 2022-08-24 PROCEDURE — 63600175 PHARM REV CODE 636 W HCPCS: Performed by: NURSE ANESTHETIST, CERTIFIED REGISTERED

## 2022-08-24 PROCEDURE — 25000003 PHARM REV CODE 250: Performed by: PHYSICIAN ASSISTANT

## 2022-08-24 PROCEDURE — 71000033 HC RECOVERY, INTIAL HOUR: Performed by: ORTHOPAEDIC SURGERY

## 2022-08-24 PROCEDURE — 27506 PR OPEN RX FEMUR FX+INTRAMED ROD: ICD-10-PCS | Mod: LT,,, | Performed by: ORTHOPAEDIC SURGERY

## 2022-08-24 PROCEDURE — 94761 N-INVAS EAR/PLS OXIMETRY MLT: CPT

## 2022-08-24 PROCEDURE — 63600175 PHARM REV CODE 636 W HCPCS: Performed by: NURSE PRACTITIONER

## 2022-08-24 PROCEDURE — C1713 ANCHOR/SCREW BN/BN,TIS/BN: HCPCS | Performed by: ORTHOPAEDIC SURGERY

## 2022-08-24 PROCEDURE — 63600175 PHARM REV CODE 636 W HCPCS: Performed by: ANESTHESIOLOGY

## 2022-08-24 PROCEDURE — 80053 COMPREHEN METABOLIC PANEL: CPT | Performed by: PHYSICIAN ASSISTANT

## 2022-08-24 PROCEDURE — 85025 COMPLETE CBC W/AUTO DIFF WBC: CPT | Performed by: PHYSICIAN ASSISTANT

## 2022-08-24 PROCEDURE — 63600175 PHARM REV CODE 636 W HCPCS: Performed by: ORTHOPAEDIC SURGERY

## 2022-08-24 DEVICE — IMPLANTABLE DEVICE: Type: IMPLANTABLE DEVICE | Site: FEMUR | Status: FUNCTIONAL

## 2022-08-24 DEVICE — SCREW LOK XL25 5X30MM: Type: IMPLANTABLE DEVICE | Site: FEMUR | Status: FUNCTIONAL

## 2022-08-24 DEVICE — SCREW LOK XL25 5X70MM: Type: IMPLANTABLE DEVICE | Site: FEMUR | Status: FUNCTIONAL

## 2022-08-24 DEVICE — SCREW BONE LOCK IM NAIL 34X5MM: Type: IMPLANTABLE DEVICE | Site: FEMUR | Status: FUNCTIONAL

## 2022-08-24 RX ORDER — SODIUM CITRATE AND CITRIC ACID MONOHYDRATE 334; 500 MG/5ML; MG/5ML
SOLUTION ORAL
Status: DISPENSED
Start: 2022-08-24 | End: 2022-08-24

## 2022-08-24 RX ORDER — ONDANSETRON HYDROCHLORIDE 4 MG/5ML
4 SOLUTION ORAL EVERY 6 HOURS PRN
Status: DISCONTINUED | OUTPATIENT
Start: 2022-08-24 | End: 2022-08-31 | Stop reason: HOSPADM

## 2022-08-24 RX ORDER — CEFAZOLIN SODIUM 2 G/50ML
2 SOLUTION INTRAVENOUS ONCE
Status: COMPLETED | OUTPATIENT
Start: 2022-08-24 | End: 2022-08-24

## 2022-08-24 RX ORDER — TALC
6 POWDER (GRAM) TOPICAL NIGHTLY PRN
Status: DISCONTINUED | OUTPATIENT
Start: 2022-08-24 | End: 2022-08-31 | Stop reason: HOSPADM

## 2022-08-24 RX ORDER — ENOXAPARIN SODIUM 100 MG/ML
30 INJECTION SUBCUTANEOUS EVERY 12 HOURS
Status: DISCONTINUED | OUTPATIENT
Start: 2022-08-24 | End: 2022-08-31 | Stop reason: HOSPADM

## 2022-08-24 RX ORDER — AMOXICILLIN 250 MG
1 CAPSULE ORAL 2 TIMES DAILY
Status: DISCONTINUED | OUTPATIENT
Start: 2022-08-24 | End: 2022-08-31 | Stop reason: HOSPADM

## 2022-08-24 RX ORDER — BISACODYL 10 MG
10 SUPPOSITORY, RECTAL RECTAL DAILY PRN
Status: DISCONTINUED | OUTPATIENT
Start: 2022-08-24 | End: 2022-08-31 | Stop reason: HOSPADM

## 2022-08-24 RX ORDER — ROCURONIUM BROMIDE 10 MG/ML
INJECTION, SOLUTION INTRAVENOUS
Status: DISCONTINUED | OUTPATIENT
Start: 2022-08-24 | End: 2022-08-24

## 2022-08-24 RX ORDER — CEFAZOLIN SODIUM 2 G/50ML
2 SOLUTION INTRAVENOUS
Status: DISCONTINUED | OUTPATIENT
Start: 2022-08-24 | End: 2022-08-24

## 2022-08-24 RX ORDER — FENTANYL CITRATE 50 UG/ML
INJECTION, SOLUTION INTRAMUSCULAR; INTRAVENOUS
Status: DISCONTINUED | OUTPATIENT
Start: 2022-08-24 | End: 2022-08-24

## 2022-08-24 RX ORDER — SODIUM CHLORIDE 0.9 % (FLUSH) 0.9 %
10 SYRINGE (ML) INJECTION
Status: DISCONTINUED | OUTPATIENT
Start: 2022-08-24 | End: 2022-08-24

## 2022-08-24 RX ORDER — DOCUSATE SODIUM 100 MG/1
100 CAPSULE, LIQUID FILLED ORAL 2 TIMES DAILY
Status: DISCONTINUED | OUTPATIENT
Start: 2022-08-24 | End: 2022-08-31 | Stop reason: HOSPADM

## 2022-08-24 RX ORDER — ONDANSETRON 2 MG/ML
4 INJECTION INTRAMUSCULAR; INTRAVENOUS EVERY 8 HOURS PRN
Status: DISCONTINUED | OUTPATIENT
Start: 2022-08-24 | End: 2022-08-31 | Stop reason: HOSPADM

## 2022-08-24 RX ORDER — SODIUM CHLORIDE 0.9 % (FLUSH) 0.9 %
10 SYRINGE (ML) INJECTION
Status: DISCONTINUED | OUTPATIENT
Start: 2022-08-24 | End: 2022-08-31 | Stop reason: HOSPADM

## 2022-08-24 RX ORDER — SODIUM CHLORIDE 9 MG/ML
INJECTION, SOLUTION INTRAVENOUS CONTINUOUS
Status: CANCELLED | OUTPATIENT
Start: 2022-08-24

## 2022-08-24 RX ORDER — PANTOPRAZOLE SODIUM 40 MG/1
40 TABLET, DELAYED RELEASE ORAL DAILY
Status: DISCONTINUED | OUTPATIENT
Start: 2022-08-24 | End: 2022-08-31 | Stop reason: HOSPADM

## 2022-08-24 RX ORDER — DEXAMETHASONE SODIUM PHOSPHATE 4 MG/ML
INJECTION, SOLUTION INTRA-ARTICULAR; INTRALESIONAL; INTRAMUSCULAR; INTRAVENOUS; SOFT TISSUE
Status: DISCONTINUED | OUTPATIENT
Start: 2022-08-24 | End: 2022-08-24

## 2022-08-24 RX ORDER — MAG HYDROX/ALUMINUM HYD/SIMETH 200-200-20
30 SUSPENSION, ORAL (FINAL DOSE FORM) ORAL EVERY 6 HOURS PRN
Status: DISCONTINUED | OUTPATIENT
Start: 2022-08-24 | End: 2022-08-31 | Stop reason: HOSPADM

## 2022-08-24 RX ORDER — IBUPROFEN 200 MG
24 TABLET ORAL
Status: DISCONTINUED | OUTPATIENT
Start: 2022-08-24 | End: 2022-08-24

## 2022-08-24 RX ORDER — HYDROMORPHONE HYDROCHLORIDE 2 MG/ML
1 INJECTION, SOLUTION INTRAMUSCULAR; INTRAVENOUS; SUBCUTANEOUS EVERY 4 HOURS PRN
Status: DISCONTINUED | OUTPATIENT
Start: 2022-08-24 | End: 2022-08-31 | Stop reason: HOSPADM

## 2022-08-24 RX ORDER — GLUCAGON 1 MG
1 KIT INJECTION
Status: DISCONTINUED | OUTPATIENT
Start: 2022-08-24 | End: 2022-08-24

## 2022-08-24 RX ORDER — ONDANSETRON 2 MG/ML
INJECTION INTRAMUSCULAR; INTRAVENOUS
Status: DISCONTINUED | OUTPATIENT
Start: 2022-08-24 | End: 2022-08-24

## 2022-08-24 RX ORDER — MIDAZOLAM HYDROCHLORIDE 1 MG/ML
INJECTION INTRAMUSCULAR; INTRAVENOUS
Status: DISCONTINUED | OUTPATIENT
Start: 2022-08-24 | End: 2022-08-24

## 2022-08-24 RX ORDER — ACETAMINOPHEN 325 MG/1
650 TABLET ORAL EVERY 6 HOURS PRN
Status: DISCONTINUED | OUTPATIENT
Start: 2022-08-24 | End: 2022-08-31 | Stop reason: HOSPADM

## 2022-08-24 RX ORDER — PROPOFOL 10 MG/ML
VIAL (ML) INTRAVENOUS
Status: DISCONTINUED | OUTPATIENT
Start: 2022-08-24 | End: 2022-08-24

## 2022-08-24 RX ORDER — HYDROCODONE BITARTRATE AND ACETAMINOPHEN 5; 325 MG/1; MG/1
1 TABLET ORAL EVERY 4 HOURS PRN
Status: DISCONTINUED | OUTPATIENT
Start: 2022-08-24 | End: 2022-08-31 | Stop reason: HOSPADM

## 2022-08-24 RX ORDER — SODIUM CITRATE AND CITRIC ACID MONOHYDRATE 334; 500 MG/5ML; MG/5ML
30 SOLUTION ORAL ONCE
Status: COMPLETED | OUTPATIENT
Start: 2022-08-24 | End: 2022-08-24

## 2022-08-24 RX ORDER — POLYETHYLENE GLYCOL 3350 17 G/17G
17 POWDER, FOR SOLUTION ORAL DAILY PRN
Status: DISCONTINUED | OUTPATIENT
Start: 2022-08-24 | End: 2022-08-31 | Stop reason: HOSPADM

## 2022-08-24 RX ORDER — HYDROMORPHONE HYDROCHLORIDE 2 MG/ML
0.4 INJECTION, SOLUTION INTRAMUSCULAR; INTRAVENOUS; SUBCUTANEOUS EVERY 5 MIN PRN
Status: DISCONTINUED | OUTPATIENT
Start: 2022-08-24 | End: 2022-08-24

## 2022-08-24 RX ORDER — METHOCARBAMOL 500 MG/1
500 TABLET, FILM COATED ORAL 3 TIMES DAILY PRN
Status: DISCONTINUED | OUTPATIENT
Start: 2022-08-24 | End: 2022-08-31 | Stop reason: HOSPADM

## 2022-08-24 RX ORDER — IBUPROFEN 200 MG
16 TABLET ORAL
Status: DISCONTINUED | OUTPATIENT
Start: 2022-08-24 | End: 2022-08-24

## 2022-08-24 RX ORDER — MORPHINE SULFATE 4 MG/ML
2 INJECTION, SOLUTION INTRAMUSCULAR; INTRAVENOUS EVERY 4 HOURS PRN
Status: DISCONTINUED | OUTPATIENT
Start: 2022-08-24 | End: 2022-08-31 | Stop reason: HOSPADM

## 2022-08-24 RX ADMIN — SODIUM CITRATE AND CITRIC ACID MONOHYDRATE 30 ML: 500; 334 SOLUTION ORAL at 09:08

## 2022-08-24 RX ADMIN — PROPOFOL 20 MG: 10 INJECTION, EMULSION INTRAVENOUS at 11:08

## 2022-08-24 RX ADMIN — ENOXAPARIN SODIUM 30 MG: 30 INJECTION SUBCUTANEOUS at 03:08

## 2022-08-24 RX ADMIN — SENNOSIDES AND DOCUSATE SODIUM 1 TABLET: 8.6; 5 TABLET ORAL at 03:08

## 2022-08-24 RX ADMIN — FENTANYL CITRATE 50 MCG: 50 INJECTION, SOLUTION INTRAMUSCULAR; INTRAVENOUS at 11:08

## 2022-08-24 RX ADMIN — PANTOPRAZOLE SODIUM 40 MG: 40 TABLET, DELAYED RELEASE ORAL at 03:08

## 2022-08-24 RX ADMIN — THERA TABS 1 TABLET: TAB at 03:08

## 2022-08-24 RX ADMIN — MIDAZOLAM 2 MG: 1 INJECTION INTRAMUSCULAR; INTRAVENOUS at 10:08

## 2022-08-24 RX ADMIN — SODIUM CHLORIDE, SODIUM GLUCONATE, SODIUM ACETATE, POTASSIUM CHLORIDE AND MAGNESIUM CHLORIDE: 526; 502; 368; 37; 30 INJECTION, SOLUTION INTRAVENOUS at 10:08

## 2022-08-24 RX ADMIN — SENNOSIDES AND DOCUSATE SODIUM 1 TABLET: 8.6; 5 TABLET ORAL at 10:08

## 2022-08-24 RX ADMIN — FENTANYL CITRATE 50 MCG: 50 INJECTION, SOLUTION INTRAMUSCULAR; INTRAVENOUS at 10:08

## 2022-08-24 RX ADMIN — PROPOFOL 50 MG: 10 INJECTION, EMULSION INTRAVENOUS at 11:08

## 2022-08-24 RX ADMIN — HYDROCODONE BITARTRATE AND ACETAMINOPHEN 1 TABLET: 5; 325 TABLET ORAL at 05:08

## 2022-08-24 RX ADMIN — CEFAZOLIN SODIUM 2 G: 2 SOLUTION INTRAVENOUS at 10:08

## 2022-08-24 RX ADMIN — ESCITALOPRAM OXALATE 20 MG: 10 TABLET ORAL at 03:08

## 2022-08-24 RX ADMIN — DOCUSATE SODIUM 100 MG: 100 CAPSULE, LIQUID FILLED ORAL at 03:08

## 2022-08-24 RX ADMIN — CHLORDIAZEPOXIDE HYDROCHLORIDE 25 MG: 25 CAPSULE ORAL at 09:08

## 2022-08-24 RX ADMIN — ONDANSETRON 4 MG: 2 INJECTION INTRAMUSCULAR; INTRAVENOUS at 11:08

## 2022-08-24 RX ADMIN — PROPOFOL 150 MG: 10 INJECTION, EMULSION INTRAVENOUS at 10:08

## 2022-08-24 RX ADMIN — DEXAMETHASONE SODIUM PHOSPHATE 4 MG: 4 INJECTION, SOLUTION INTRA-ARTICULAR; INTRALESIONAL; INTRAMUSCULAR; INTRAVENOUS; SOFT TISSUE at 10:08

## 2022-08-24 RX ADMIN — AMLODIPINE BESYLATE 10 MG: 5 TABLET ORAL at 03:08

## 2022-08-24 RX ADMIN — HYDROMORPHONE HYDROCHLORIDE 0.4 MG: 2 INJECTION, SOLUTION INTRAMUSCULAR; INTRAVENOUS; SUBCUTANEOUS at 12:08

## 2022-08-24 RX ADMIN — ROCURONIUM BROMIDE 50 MG: 10 INJECTION, SOLUTION INTRAVENOUS at 10:08

## 2022-08-24 RX ADMIN — HYDROCODONE BITARTRATE AND ACETAMINOPHEN 1 TABLET: 5; 325 TABLET ORAL at 09:08

## 2022-08-24 RX ADMIN — FOLIC ACID 1 MG: 1 TABLET ORAL at 03:08

## 2022-08-24 RX ADMIN — Medication 100 MG: at 03:08

## 2022-08-24 RX ADMIN — ENOXAPARIN SODIUM 30 MG: 30 INJECTION SUBCUTANEOUS at 09:08

## 2022-08-24 RX ADMIN — HYDROCODONE BITARTRATE AND ACETAMINOPHEN 1 TABLET: 5; 325 TABLET ORAL at 03:08

## 2022-08-24 RX ADMIN — DOCUSATE SODIUM 100 MG: 100 CAPSULE, LIQUID FILLED ORAL at 09:08

## 2022-08-24 NOTE — PLAN OF CARE
Problem: Occupational Therapy  Goal: Occupational Therapy Goal  Description: Patient will perform toileting with min assist using BSC    Patient will perform toilet/BSC transfer with mod assist with use of RW    Patient will perform lower body dressing with min assist while seated EOB, with use of DME     Outcome: Ongoing, Progressing

## 2022-08-24 NOTE — TRANSFER OF CARE
"Anesthesia Transfer of Care Note    Patient: Mary Lou Middleton    Procedure(s) Performed: Procedure(s) (LRB):  INSERTION, INTRAMEDULLARY TERENCE, FEMUR (Left)    Patient location: PACU    Anesthesia Type: general    Transport from OR: Transported from OR on room air with adequate spontaneous ventilation    Post pain: adequate analgesia    Post assessment: no apparent anesthetic complications    Post vital signs: stable    Level of consciousness: responds to stimulation and sedated    Nausea/Vomiting: no nausea/vomiting    Complications: none    Transfer of care protocol was followed      Last vitals:   Visit Vitals  BP (!) 146/78   Pulse 72   Temp 36.9 °C (98.4 °F) (Oral)   Resp 20   Ht 5' 5" (1.651 m)   Wt 72.6 kg (160 lb)   SpO2 (!) 93%   BMI 26.63 kg/m²     "

## 2022-08-24 NOTE — ANESTHESIA POSTPROCEDURE EVALUATION
Anesthesia Post Evaluation    Patient: Mary Lou Middleton    Procedure(s) Performed: Procedure(s) (LRB):  INSERTION, INTRAMEDULLARY TERENCE, FEMUR (Left)    Final Anesthesia Type: general      Patient location during evaluation: PACU  Patient participation: Yes- Able to Participate  Level of consciousness: awake and alert and oriented  Post-procedure vital signs: reviewed and stable  Pain management: adequate  Airway patency: patent    PONV status at discharge: No PONV  Anesthetic complications: no      Cardiovascular status: hemodynamically stable  Respiratory status: unassisted  Hydration status: euvolemic  Follow-up not needed.          Vitals Value Taken Time   /68 08/24/22 1241   Temp 36.6 °C (97.9 °F) 08/24/22 1205   Pulse 78 08/24/22 1240   Resp 18 08/24/22 1240   SpO2 93 % 08/24/22 1240   Vitals shown include unvalidated device data.      Event Time   Out of Recovery 08/24/2022 12:43:18         Pain/Jovita Score: Pain Rating Prior to Med Admin: 6 (8/24/2022 12:31 PM)  Jovita Score: 9 (8/24/2022 12:30 PM)

## 2022-08-24 NOTE — PLAN OF CARE
Problem: Physical Therapy  Goal: Physical Therapy Goal  Description: Goals to be met by: 2022     Patient will increase functional independence with mobility by performin. Supine to sit with Modified [place]  2. Sit to supine with Modified [place]  3. Sit to stand transfer with Modified [place]  4. Wheelchair propulsion x500 feet with Modified [place] using bilateral uppper extremities    Outcome: Ongoing, Progressing

## 2022-08-24 NOTE — PT/OT/SLP EVAL
Physical Therapy Evaluation    Patient Name:  Mary Lou Middleton   MRN:  [id number]    Recommendations:     Discharge Recommendations:  rehabilitation facility, home health PT   Discharge Equipment Recommendations: wheelchair   Barriers to discharge: acuity of illness    Assessment:     Mary Lou Middleton is a 71 y.o. female admitted with a medical diagnosis of Displaced spiral fracture of shaft of left femur, initial encounter for closed fracture.  She presents with the following impairments/functional limitations:  weakness, impaired endurance, impaired self care skills, impaired functional mobility, decreased lower extremity function, pain, decreased ROM, orthopedic precautions.    Rehab Prognosis: Good; patient would benefit from acute skilled PT services to address these deficits and reach maximum level of function.    Recent Surgery: Procedure(s) (LRB):  INSERTION, INTRAMEDULLARY TERENCE, FEMUR (Left) Day of Surgery    Plan:     During this hospitalization, patient to be seen daily to address the identified rehab impairments via therapeutic activities, therapeutic exercises, neuromuscular re-education, wheelchair management/training and progress toward the following goals:    · Plan of Care Expires:  09/23/22    Subjective     Chief Complaint: pain  Patient/Family Comments/goals: to be independent  Pain/Comfort:  · Pain Rating 1: 5/10  · Location - Side 1: Left  · Location - Orientation 1: lower  · Location 1: leg  · Pain Addressed 1: Nurse notified    Patients cultural, spiritual, Mandaeism conflicts given the current situation: no    Living Environment:  Pt lives alone in a mobile home w/ 5 steps to enter w/ bilateral handrails. Since pt is limited to transfers only, home will need modifications with a ramp.   Prior to admission, patients level of function was independent.  Equipment used at home: none.  DME owned (not currently used): rolling walker.  Upon discharge, patient will have assistance from family or  friends.    Objective:     Communicated with RN prior to session.  Patient found HOB elevated with peripheral IV, oxygen  upon PT entry to room.    General Precautions: Standard,     Orthopedic Precautions:LLE weight bearing as tolerated (For transfer only no ambulation)   Braces: N/A  Respiratory Status: Nasal cannula, flow 2 L/min    Exams:  · Cognitive Exam:  Patient is oriented to Person, Place, Time and Situation  · LLE ROM: knee flexion moderately limited  · LLE Strength: grossly 3-/5    Functional Mobility:  · Bed Mobility:     · Supine to Sit: minimum assistance  · Sit to Supine: minimum assistance  · Transfers:     · Sit to Stand:  minimum assistance with rolling walker         AM-PAC 6 CLICK MOBILITY  Total Score:14     Patient left HOB elevated with all lines intact, call button in reach and RN notified.    GOALS:   Multidisciplinary Problems     Physical Therapy Goals        Problem: Physical Therapy    Goal Priority Disciplines Outcome Goal Variances Interventions   Physical Therapy Goal     PT, PT/OT Ongoing, Progressing     Description: Goals to be met by: 2022     Patient will increase functional independence with mobility by performin. Supine to sit with Modified [place]  2. Sit to supine with Modified [place]  3. Sit to stand transfer with Modified [place]  4. Wheelchair propulsion x500 feet with Modified [place] using bilateral uppper extremities                     History:     Past Medical History:   Diagnosis Date    Hypertension        History reviewed. No pertinent surgical history.    Time Tracking:     PT Received On: 22  PT Start Time: 1300     PT Stop Time: 1315  PT Total Time (min): 15 min     Billable Minutes: Evaluation moderate complexity      2022

## 2022-08-24 NOTE — PT/OT/SLP EVAL
Occupational Therapy   Evaluation    Name: Mary Lou Middleton  MRN: [id number]  Admitting Diagnosis:  Displaced spiral fracture of shaft of left femur, initial encounter for closed fracture  Recent Surgery: Procedure(s) (LRB):  INSERTION, INTRAMEDULLARY TERENCE, FEMUR (Left) Day of Surgery    Recommendations:     Discharge Recommendations: rehabilitation facility  Discharge Equipment Recommendations:  wheelchair, bedside commode  Barriers to discharge:  Inaccessible home environment, Other (Comment) (mobile home with 5 steps to enter, no ramp)    Assessment:     Mary Lou Middleton is a 71 y.o. female with a medical diagnosis of Displaced spiral fracture of shaft of left femur, initial encounter for closed fracture.  She presents with fatigue from surgery today, cognition intact and motivated to participate. Performance deficits affecting function: weakness, impaired endurance, impaired self care skills, impaired functional mobility, impaired balance, decreased lower extremity function, decreased safety awareness, pain, decreased ROM, edema, orthopedic precautions.      Rehab Prognosis: Good; patient would benefit from acute skilled OT services to address these deficits and reach maximum level of function.       Plan:     Patient to be seen 3 x/week, 5 x/week to address the above listed problems via self-care/home management, therapeutic activities, therapeutic exercises  · Plan of Care Expires: 09/07/22  · Plan of Care Reviewed with: patient    Subjective     Chief Complaint: fatigue, LLE pain with mobility  Patient/Family Comments/goals: DC to rehab as needed, ultimately return home at mod I level    Occupational Profile:  Living Environment: mobile home, alone, 5 steps to enter with handrails at both sides (recommended ramp)  Previous level of function: I with ADLs and mobility, still driving  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  Patient found supine with peripheral IV, oxygen, blood pressure cuff, telemetry, pulse ox (continuous), PureWick upon OT entry to room.    General Precautions: Standard, fall   Orthopedic Precautions:LLE weight bearing as tolerated (for transfers only, no ambulation)   Braces:    Respiratory Status: Nasal cannula, flow 3 L/min    Occupational Performance:    Bed Mobility:    · Patient completed Rolling/Turning to Left with  moderate assistance  · Patient completed Supine to Sit with moderate assistance  · Patient completed Sit to Supine with maximal assistance    Functional Mobility/Transfers:  · Not attempted at this time d/t c/o increased pain with sitting EOB  · Functional Mobility: NA    Activities of Daily Living:  · Feeding:  stand by assistance to drink liquids  · Grooming: stand by assistance bedside  · Upper Body Dressing: minimum assistance EOB  · Lower Body Dressing: total assistance EOB  · Toileting: maximal assistance with purewick, unable to attempt tf to BSC at this time    Cognitive/Visual Perceptual:  Cognitive/Psychosocial Skills:     -       Oriented to: Person, Place, Time and Situation   -       Follows Commands/attention:Follows one-step commands  -       Safety awareness/insight to disability: intact     Physical Exam:  Upper Extremity Range of Motion:     -       Right Upper Extremity: WFL  -       Left Upper Extremity: WFL  Upper Extremity Strength:    -       Right Upper Extremity:  WFL  -       Left Upper Extremity: WFL    AMPAC 6 Click ADL:  AMPAC Total Score: 15    Treatment & Education:  Pt educated to use call bell for assist, will request BSC be brought to room, OT goals to use DME for LB dressing and to tf independently to WC and toilet, and reviewed LLE WBAT precautions for transfers only.  Education:    Patient left supine with all lines intact, call button in reach and nurse present    GOALS:   Multidisciplinary Problems     Occupational Therapy Goals        Problem: Occupational Therapy    Goal Priority Disciplines Outcome Interventions   Occupational Therapy Goal    Medium OT, PT/OT Ongoing, Progressing    Description: Patient will perform toileting with min assist using BSC    Patient will perform toilet/BSC transfer with mod assist with use of RW    Patient will perform lower body dressing with min assist while seated EOB, with use of DME                      History:     Past Medical History:   Diagnosis Date    Hypertension        History reviewed. No pertinent surgical history.    Time Tracking:     OT Date of Treatment: 08/24/22  OT Start Time: 1505  OT Stop Time: 1520  OT Total Time (min): 15 min    Billable Minutes:Evaluation 15    8/24/2022

## 2022-08-24 NOTE — ANESTHESIA PREPROCEDURE EVALUATION
08/24/2022  Mary Lou Middleton is a 71 y.o., female.    Mary Lou Middleton    Pre-op Diagnosis: Closed fracture of left femur, unspecified fracture morphology, unspecified portion of femur, initial encounter [S72.92XA]    Procedure(s):  INSERTION, INTRAMEDULLARY TERENCE, FEMUR     Review of patient's allergies indicates:  No Known Allergies    Current Outpatient Medications   Medication Instructions    ALPRAZolam (XANAX XR) 1 MG Tb24 Oral, Daily PRN    amLODIPine (NORVASC) 10 mg, Oral, Daily    benazepril-hydrochlorthiazide (LOTENSIN HCT) 10-12.5 mg Tab 1 tablet, Oral, Daily    EScitalopram oxalate (LEXAPRO) 20 mg, Oral, Daily           Past Medical History:   Diagnosis Date    Hypertension        History reviewed. No pertinent surgical history.      VITAL SIGNS: 24 HR MIN & MAX LAST  Temp  Min: 36.6 °C (97.9 °F)  Max: 37 °C (98.6 °F) 36.9 °C (98.4 °F)  BP  Min: 105/63  Max: 165/78 (!) 146/78  Pulse  Min: 67  Max: 91 72  Resp  Min: 12  Max: 26 20  SpO2  Min: 90 %  Max: 100 % (!) 93 %        X-Ray Chest 1 View    Result Date: 8/23/2022  EXAMINATION: XR CHEST 1 VIEW CLINICAL HISTORY: Injury, unspecified, initial encounter TECHNIQUE: Single view of the chest COMPARISON: No prior imaging available for comparison. FINDINGS: No focal opacification, pleural effusion, or pneumothorax. The cardiomediastinal silhouette is within normal limits. No acute osseous abnormality.     No acute cardiopulmonary process. Electronically signed by: Jamshid Matos Date:    08/23/2022 Time:    13:36        Pre-op Assessment    I have reviewed the Patient Summary Reports.    I have reviewed the NPO Status.   I have reviewed the Medications.     Review of Systems  Anesthesia Hx:  No problems with previous Anesthesia  Denies Family Hx of Anesthesia complications.   Denies Personal Hx of Anesthesia complications.   Social:  Non-Smoker     Cardiovascular:   Exercise tolerance: good Hypertension  Denies Angina.  Denies Orthopnea.  Denies PND.  Denies GOETZ.  Functional Capacity good / => 4 METS    Musculoskeletal:  Musculoskeletal Normal    Neurological:   Denies TIA. Denies CVA.    Psych:   anxiety          Physical Exam  General: Well nourished, Alert and Oriented    Airway:  Mallampati: III   Mouth Opening: Normal  TM Distance: Normal  Tongue: Normal  Neck ROM: Normal ROM    Dental:  Intact    Chest/Lungs:  Clear to auscultation    Heart:  Rate: Normal  Rhythm: Regular Rhythm  No pretibial edema  No carotid bruits      Anesthesia Plan  Type of Anesthesia, risks & benefits discussed:    Anesthesia Type: Gen ETT  Intra-op Monitoring Plan: Standard ASA Monitors  Post Op Pain Control Plan: multimodal analgesia  Induction:  IV  Airway Plan: Direct, Post-Induction  Informed Consent: Informed consent signed with the Patient and all parties understand the risks and agree with anesthesia plan.  All questions answered. Patient consented to blood products? Yes  ASA Score: 3  Day of Surgery Review of History & Physical: H&P Update referred to the surgeon/provider.    Ready For Surgery From Anesthesia Perspective.     .

## 2022-08-24 NOTE — PROGRESS NOTES
"Patient Name: Mary Lou Middleton  MRN: [id number]  Admission Date: 8/23/2022  Hospital Length of Stay: 1 days  Attending Provider: Maicol Spain Jr., MD  Primary Care Provider: Rosalinda Torres NP  Follow-up For: Procedure(s) (LRB):  INSERTION, INTRAMEDULLARY TERENCE, FEMUR (Left)    Post-Operative Day: Day of Surgery  Subjective:       Principal Orthopedic Problem: Day of Surgery   Left spiral femur shaft    Interval History:  No acute issues reported overnight.  Patient is resting comfortably.  States that she is ready for surgery today.    Review of patient's allergies indicates:  No Known Allergies    Current Facility-Administered Medications   Medication    ALPRAZolam tablet 1 mg    amLODIPine tablet 10 mg    chlordiazepoxide capsule 25 mg    chlordiazepoxide capsule 25 mg    [START ON 8/25/2022] chlordiazepoxide capsule 25 mg    EScitalopram oxalate tablet 20 mg    folic acid tablet 1 mg    HYDROcodone-acetaminophen 5-325 mg per tablet 1 tablet    lorazepam injection 1 mg    morphine injection 1 mg    multivitamin tablet    thiamine tablet 100 mg     Objective:     Vital Signs (Most Recent):  Temp: 98.3 °F (36.8 °C) (08/24/22 0511)  Pulse: 83 (08/24/22 0511)  Resp: 20 (08/24/22 0538)  BP: (!) 154/67 (08/24/22 0511)  SpO2: (!) 93 % (08/24/22 0511) Vital Signs (24h Range):  Temp:  [97.9 °F (36.6 °C)-98.6 °F (37 °C)] 98.3 °F (36.8 °C)  Pulse:  [69-91] 83  Resp:  [12-26] 20  SpO2:  [92 %-100 %] 93 %  BP: (105-165)/(56-86) 154/67     Weight: 72.6 kg (160 lb)  Height: 5' 5" (165.1 cm)  Body mass index is 26.63 kg/m².      Intake/Output Summary (Last 24 hours) at 8/24/2022 0740  Last data filed at 8/24/2022 0500  Gross per 24 hour   Intake --   Output 800 ml   Net -800 ml       Physical Exam:   Ortho/SPM Exam    General the patient is alert and oriented x3 no acute distress nontoxic-appearing appropriate affect.    Constitutional: Vital signs are examined and stable.  Resp: No signs of labored " breathing    Musculoskeletal Exam:  Left lower extremity:  Knee immobilizer in place.  Thigh is swollen but compressible.  No open wounds or abrasions noted.  Site marked.  Palpable DP pulse.  5/5 motor strength dorsiflexion plantar flexion of the ankle and digits.    Diagnostic Findings:   Significant Labs: All pertinent labs within the past 24 hours have been reviewed.  Recent Lab Results  (Last 5 results in the past 24 hours)      08/24/22  0354   08/24/22  0353   08/23/22  1516   08/23/22  1515   08/23/22  1308        Phencyclidine       Negative         Albumin/Globulin Ratio 1.0         1.0       Albumin 2.9         3.6       Alcohol, Serum         <10.0       Alkaline Phosphatase 75         94       ALT 29         38       Amphetamine Screen, Ur       Negative         Appearance, UA     Clear           AST 31         41       Bacteria, UA     None Seen           Barbiturate Screen, Ur       Negative         Baso #   0.04       0.07       Basophil %   0.4       0.5       Benzodiazepine Screen, Urine       Positive         BILIRUBIN TOTAL 0.6         0.4       Bilirubin, UA     Negative           BUN 5.7         9.0       Calcium 9.1         9.5       Cannabinoids, Urine       Negative         Chloride 103         95       CO2 30         24       Cocaine (Metab.)       Negative         Color, UA     Yellow           Creatinine 0.66         0.72       eGFR >60         >60       Eos #   0.09       0.24       Eosinophil %   0.9       1.6       Fentanyl, Urine       Positive         Globulin, Total 2.9         3.5       Glucose 97         178       Glucose, UA     Negative           Hematocrit   41.3       47.3       Hemoglobin   13.4       15.2       Immature Grans (Abs)   0.04       0.10       Immature Granulocytes   0.4       0.7       Ketones, UA     Negative           Leukocytes, UA     Trace           Lymph #   3.06       3.18       LYMPH %   29.2       21.5       MCH   32.5       32.3       MCHC   32.4        32.1       MCV   100.2       100.4       MDMA, Urine       Negative         Mono #   0.89       0.76       Mono %   8.5       5.1       MPV   10.5       10.4       Neut #   6.4       10.4       Neut %   60.6       70.6       NITRITE UA     Negative           nRBC   0.0       0.0       Occult Blood UA     Negative           Opiate Scrn, Ur       Negative         pH, UA     7.0           pH, Urine       7.0         Platelets   256       290       Potassium 4.0         3.2       PROTEIN TOTAL 5.8         7.1       Protein, UA     Negative           RBC   4.12       4.71       RBC, UA     <5           RDW   14.1       14.0       Sodium 139         131       Specific Gravity,UA     1.010           Specific Gravity, Urine Auto       1.010         Squam Epithel, UA     <5           Urobilinogen, UA     0.2           WBC, UA     <5           WBC   10.5       14.8                             Significant Imaging: I have reviewed and interpreted all pertinent imaging results/findings.     Assessment/Plan:     There are no hospital problems to display for this patient.  The risks, benefits and alternatives treatment were discussed at length with the patient today including but not limited to pain, bleeding, scarring, infection, damage to neurovascular structures, malunion/nonunion, hardware failure/irritation, need for future procedures and complications leading to amputation and even death.  I have agreed to assume care of her injury today from my partner due to my earlier operative availability.  We will plan to take her down to the operating room for intramedullary nailing of her left femur today.  She is happy with this plan of care and understands and agrees.          Silverio Vaughn MD  Orthopedic Trauma Surgery  [facility]

## 2022-08-24 NOTE — ANESTHESIA PROCEDURE NOTES
Intubation    Date/Time: 8/24/2022 10:42 AM  Performed by: Genna Melgoza CRNA  Authorized by: Jayjay Lagunas MD     Intubation:     Induction:  Intravenous    Intubated:  Postinduction    Mask Ventilation:  Easy mask    Attempts:  1    Attempted By:  Student    Method of Intubation:  Direct    Blade:  Jones 2    Laryngeal View Grade: Grade I - full view of cords      Difficult Airway Encountered?: No      Complications:  None    Airway Device:  Oral endotracheal tube    Airway Device Size:  7.0    Style/Cuff Inflation:  Cuffed    Inflation Amount (mL):  10    Tube secured:  21    Secured at:  The lips    Placement Verified By:  Capnometry    Complicating Factors:  None    Findings Post-Intubation:  BS equal bilateral

## 2022-08-24 NOTE — PROGRESS NOTES
[facility] Hospital Medicine consultation f/u Note        Chief Complaint: Inpatient Follow-up for fall down the stairs with left mid shaft femur fracture     HPI: 71-year-old female with known past medical history of hypertension, anxiety, nicotine dependence, regular alcohol use admitted 08/23 with a diagnosis of comminuted left femoral shaft fracture due to fall at her home of the steps.  Patient reports she fell 5 steps down in the front of her house, patient reports she lives alone and at the time she had to scoot herself to the phone to call the EMS.  Patient denies hitting her head or loss of consciousness.   Patient states she smokes at least 1 pack a day, drinks 3-4 glasses of liquor daily and last consumption was around 7 or 8:00 p.m. last night.  Denies any drug use.  Reported no history of alcohol withdrawals in the past when she would not drink for few days   States she takes amlodipine, hydrochlorothiazide, Lexapro and Xanax at night.  Reported she does not remember the doses of amlodipine, HCTZ.  Stated her PCP increase the dose of Lexapro from 10-20 mg and she thinks that she takes 10 mg of Xanax at night.  I informed patient that Xanax does not come in 10 mg.  Brother at bedside stated he will call home and check the doses informed the patient's nurse in ED   In the ED she went x-ray of the left femur which showed mid shaft fracture.   Patient is admitted to orthopedic service for surgical intervention.  Hospital medicine consulted for medical management.     Interval Hx:   Laying in bed, no complaints.  Currently NPO.  Plan for surgery today.    No family at bedside.   Case discussed with patient nurse on the floor    Objective/physical exam:  General: In no acute distress, afebrile, laying in bed, mild head tremor noted   Chest: Clear to auscultation bilaterally   Heart: S1, S2, no appreciable murmur   Abdomen: Soft, nontender, BS +   MSK: Warm, no lower extremity  edema, no clubbing or cyanosis, left lower extremity in knee immobilizer   Neurologic: Alert and oriented x4, moving all extremities with good strength      VITAL SIGNS: 24 HRS MIN & MAX LAST   Temp  Min: 97.9 °F (36.6 °C)  Max: 98.6 °F (37 °C) 98.3 °F (36.8 °C)   BP  Min: 105/63  Max: 165/78 (!) 154/67   Pulse  Min: 69  Max: 91  83   Resp  Min: 12  Max: 26 20   SpO2  Min: 92 %  Max: 100 % (!) 93 %       Recent Labs   Lab 08/23/22  1308 08/24/22  0353   WBC 14.8* 10.5   RBC 4.71 4.12*   HGB 15.2 13.4   HCT 47.3* 41.3   .4* 100.2*   MCH 32.3* 32.5*   MCHC 32.1* 32.4*   RDW 14.0 14.1    256   MPV 10.4 10.5*       Recent Labs   Lab 08/23/22  1308 08/24/22  0354   * 139   K 3.2* 4.0   CO2 24 30   BUN 9.0* 5.7*   CREATININE 0.72 0.66   CALCIUM 9.5 9.1   ALBUMIN 3.6 2.9*   ALKPHOS 94 75   ALT 38 29   AST 41* 31   BILITOT 0.4 0.6          Microbiology Results (last 7 days)     ** No results found for the last 168 hours. **           See below for Radiology    Scheduled Med:   amLODIPine  10 mg Oral Daily    chlordiazepoxide  25 mg Oral TID    chlordiazepoxide  25 mg Oral BID    [START ON 8/25/2022] chlordiazepoxide  25 mg Oral Once    EScitalopram oxalate  20 mg Oral Daily    folic acid  1 mg Oral Daily    multivitamin  1 tablet Oral Daily    thiamine  100 mg Oral Daily        Continuous Infusions:       PRN Meds:  ALPRAZolam, HYDROcodone-acetaminophen, lorazepam, morphine       Assessment/Plan:  Comminuted left femoral shaft fracture due to fall at home    Hypokalemia/hyponatremia   Alcohol abuse - monitor for alcohol withdrawal   Tobacco abuse   History of hypertension    History of anxiety   DNR status            Admitted to orthopedics are the primary, hospital Medicine Team consulted for medical management   Continue with recommendations from primary team   X-ray left femur personally review, mid shaft comminuted fracture   NPO for now, plan OR today  Oxygen supplementation as needed,  patient reported she does not use oxygen at home   Control pain and nausea PRN   UA negative for signs of infection- clean catch showed  UDS- positive for benzodiazepines, fentanyl.  Patient is on Xanax at nighttime at home and fentanyl probably received in the ER  Ethanol level less than 10    K normalized with supplementation  Continue with IV hydration.   received banana bags x1 yesterday  Continue with thiamine, folate, and multivitamin daily   Initiated short Librium protocol--> 25 mg t.i.d. for 1 day, then 25 mg b.i.d. for 1 day, then 25 mg daily for 1 day and then stop   CIWA q.4 hours  Ativan 1mg IV PRN for symptoms of alcohol withdrawal   Nicotine patch if desired   Morning cbc wo, bmp ordered for post op eval      VTE prophylaxis: SCD for now, lovenox post op probably    Patient condition:  Stable    Anticipated discharge and Disposition:    Probable rehab      All diagnosis and differential diagnosis have been reviewed; assessment and plan has been documented; I have personally reviewed the labs and test results that are presently available; I have reviewed the patients medication list; I have reviewed the consulting providers response and recommendations. I have reviewed or attempted to review medical records based upon their availability    All of the patient's questions have been  addressed and answered. Patient's is agreeable to the above stated plan. I will continue to monitor closely and make adjustments to medical management as needed.  _____________________________________________________________________    Nutrition Status:    Radiology:  CT Knee Without Contrast Left  Narrative: EXAMINATION:  CT KNEE WITHOUT CONTRAST LEFT    CLINICAL HISTORY:  Fracture, knee;    TECHNIQUE:  Helical acquisition through the left knee without IV contrast three plane reconstructions were provided for review.  mGycm. Automatic exposure control, adjustment of mA/kV or iterative reconstruction technique was used  to reduce radiation.    COMPARISON:  Radiographs earlier today    FINDINGS:  There is partially imaged comminuted and displaced fracture of the distal femoral metadiaphysis.  Nondisplaced portion of the fracture may extend in the far anteromedial aspect of the knee joint on image 17 series 12.  The patella, tibia and fibula are intact.  There are degenerative changes of the knee with osteophytosis and mild joint space narrowing in the medial compartment.  There is a small joint effusion.  Impression: As above.    Electronically signed by: Reg Rodriges  Date:    08/23/2022  Time:    17:10  X-Ray Femur Ap/Lat Left  Narrative: EXAMINATION:  XR FEMUR 2 VIEW LEFT    CLINICAL HISTORY:  Unspecified fall, initial encounter    TECHNIQUE:  AP and lateral views of the left femur    COMPARISON:  None}    FINDINGS:  Spiral comminuted fracture of the mid to distal left femoral shaft.  Distal shaft is displaced posteromedially by about 1 shaft width.  Left hip and knee grossly aligned.  Impression: Comminuted left femoral shaft fracture.    Electronically signed by: Saad Wagoner  Date:    08/23/2022  Time:    14:25  X-Ray Chest 1 View  Narrative: EXAMINATION:  XR CHEST 1 VIEW    CLINICAL HISTORY:  Injury, unspecified, initial encounter    TECHNIQUE:  Single view of the chest    COMPARISON:  No prior imaging available for comparison.    FINDINGS:  No focal opacification, pleural effusion, or pneumothorax.    The cardiomediastinal silhouette is within normal limits.    No acute osseous abnormality.  Impression: No acute cardiopulmonary process.    Electronically signed by: Jamshid Matos  Date:    08/23/2022  Time:    13:36      Usman Urbina MD   08/24/2022

## 2022-08-24 NOTE — OP NOTE
[facility]                       [address] [place] [id number]    PATIENT NAME:      ALEXANDRU PRADO   YOB: 1950  CSN:               [id number]  MRN:               [id number]  ADMIT DATE:        08/23/2022 12:27:00  PHYSICIAN:         Silverio Vaughn MD                          OPERATIVE REPORT      DATE OF SURGERY:    08/24/2022 00:00:00    SURGEON:  Silverio Vaughn MD    PREOPERATIVE DIAGNOSIS:  Left spiral femur shaft fracture.    POSTOPERATIVE DIAGNOSIS:  Left spiral femur shaft fracture.    PROCEDURE PERFORMED:  Intramedullary nailing of left femur shaft fracture.    ANESTHESIA:  General.    ESTIMATED BLOOD LOSS:  100 cc.    IMPLANTS:  Synthes RFN-A 12 x 400 mm nail with 4 distal and 2 proximal   interlocking screws.    COMPLICATIONS:  None.    COUNTS:  All counts correct x2 at the end of the case.    INDICATIONS FOR PROCEDURE:  The patient is a 71-year-old female, who had a fall   at home.  She sustained a left midshaft femur fracture with extension down into   the supracondylar region.  She was seen initially by my partner, Dr. Maicol Spain.  I have assumed care of her injury due to my earlier operative   availability.  The risks, benefits, and alternatives of treatment were discussed   at length with the patient.  She was brought to the operating room today for   operative stabilization of her femur fracture.    PROCEDURE IN DETAIL:  After informed consent was obtained, the patient was met   in the preoperative holding area and her site was marked.  She was taken to the   operating room.  She was placed supine on the operating table.  General   anesthesia was induced.  All bony prominences were well padded.  Preoperative   antibiotics were given.  Her left lower extremity was prepped and draped in   standard sterile fashion.  A time-out was done, indicating correct operative   limb and procedure.  A  proximal tibia traction pin was applied.  She was placed   over the radiolucent triangles.  The fracture was pulled out to length and   reduced with bumps.  An incision was made over the anterior aspect of the knee,   splitting the patellar tendon in line with its fibers.  A guidewire was placed   and the opening reamer was passed.  A ball-tip guidewire was placed centered   within the femoral canal as it was held in a reduced position by my assistant.    Please note that my assistant was Annelise Ivey, nurse practitioner, and   necessary for a skilled set of hands to assist with reduction of the fracture as   well as application of hardware and deep closure.      The length was measured.  She was reamed up to 13.5 mm and a 12 mm nail was   malleted into position.  The fracture remained well reduced.  Four distal   interlocking screws were placed through the jig.  Two proximal screws were   placed with perfect circles.  Traction was released.  The jigs were removed.    She was put through a range of motion and found to be stable.  All the hardware   was in appropriate position.  The wounds were irrigated and closed using 1   Vicryl, 2-0 Monocryl, and staples.  Xeroform, 4 x 4's, cast padding, and Ace   bandage were applied.  Patient was awakened, extubated, and taken to recovery in   stable condition.    POSTOPERATIVE PLAN:  She will be admitted to the floor.  She can weightbear as   tolerated to the left lower extremity for stand and transfers only.  I do not   want her ambulating.  Lovenox for DVT prophylaxis while in-house.        ______________________________  MD BRENDA Jorgensen/MARIYA  DD:  08/24/2022  Time:  11:49AM  DT:  08/24/2022  Time:  12:29PM  Job #:  [id number]      OPERATIVE REPORT

## 2022-08-25 LAB
ANION GAP SERPL CALC-SCNC: 10 MEQ/L
BASOPHILS # BLD AUTO: 0.03 X10(3)/MCL (ref 0–0.2)
BASOPHILS NFR BLD AUTO: 0.2 %
BUN SERPL-MCNC: 12.6 MG/DL (ref 9.8–20.1)
CALCIUM SERPL-MCNC: 8.7 MG/DL (ref 8.4–10.2)
CHLORIDE SERPL-SCNC: 104 MMOL/L (ref 98–107)
CO2 SERPL-SCNC: 25 MMOL/L (ref 23–31)
CREAT SERPL-MCNC: 0.75 MG/DL (ref 0.55–1.02)
CREAT/UREA NIT SERPL: 17
EOSINOPHIL # BLD AUTO: 0 X10(3)/MCL (ref 0–0.9)
EOSINOPHIL NFR BLD AUTO: 0 %
ERYTHROCYTE [DISTWIDTH] IN BLOOD BY AUTOMATED COUNT: 14.2 % (ref 11.5–17)
GFR SERPLBLD CREATININE-BSD FMLA CKD-EPI: >60 MLS/MIN/1.73/M2
GLUCOSE SERPL-MCNC: 94 MG/DL (ref 82–115)
HCT VFR BLD AUTO: 36.3 % (ref 37–47)
HGB BLD-MCNC: 11.4 GM/DL (ref 12–16)
IMM GRANULOCYTES # BLD AUTO: 0.06 X10(3)/MCL (ref 0–0.04)
IMM GRANULOCYTES NFR BLD AUTO: 0.4 %
LYMPHOCYTES # BLD AUTO: 2.83 X10(3)/MCL (ref 0.6–4.6)
LYMPHOCYTES NFR BLD AUTO: 20 %
MCH RBC QN AUTO: 32.3 PG (ref 27–31)
MCHC RBC AUTO-ENTMCNC: 31.4 MG/DL (ref 33–36)
MCV RBC AUTO: 102.8 FL (ref 80–94)
MONOCYTES # BLD AUTO: 1.13 X10(3)/MCL (ref 0.1–1.3)
MONOCYTES NFR BLD AUTO: 8 %
NEUTROPHILS # BLD AUTO: 10.1 X10(3)/MCL (ref 2.1–9.2)
NEUTROPHILS NFR BLD AUTO: 71.4 %
NRBC BLD AUTO-RTO: 0 %
PLATELET # BLD AUTO: 230 X10(3)/MCL (ref 130–400)
PMV BLD AUTO: 10.9 FL (ref 7.4–10.4)
POTASSIUM SERPL-SCNC: 4 MMOL/L (ref 3.5–5.1)
RBC # BLD AUTO: 3.53 X10(6)/MCL (ref 4.2–5.4)
SODIUM SERPL-SCNC: 139 MMOL/L (ref 136–145)
WBC # SPEC AUTO: 14.1 X10(3)/MCL (ref 4.5–11.5)

## 2022-08-25 PROCEDURE — 25000003 PHARM REV CODE 250: Performed by: INTERNAL MEDICINE

## 2022-08-25 PROCEDURE — 63600175 PHARM REV CODE 636 W HCPCS: Performed by: NURSE PRACTITIONER

## 2022-08-25 PROCEDURE — 11000001 HC ACUTE MED/SURG PRIVATE ROOM

## 2022-08-25 PROCEDURE — 25000003 PHARM REV CODE 250: Performed by: PHYSICIAN ASSISTANT

## 2022-08-25 PROCEDURE — 85025 COMPLETE CBC W/AUTO DIFF WBC: CPT | Performed by: NURSE PRACTITIONER

## 2022-08-25 PROCEDURE — 36415 COLL VENOUS BLD VENIPUNCTURE: CPT | Performed by: NURSE PRACTITIONER

## 2022-08-25 PROCEDURE — 80048 BASIC METABOLIC PNL TOTAL CA: CPT | Performed by: NURSE PRACTITIONER

## 2022-08-25 PROCEDURE — 25000003 PHARM REV CODE 250: Performed by: NURSE PRACTITIONER

## 2022-08-25 PROCEDURE — 97530 THERAPEUTIC ACTIVITIES: CPT

## 2022-08-25 PROCEDURE — 25000003 PHARM REV CODE 250: Performed by: EMERGENCY MEDICINE

## 2022-08-25 RX ADMIN — CHLORDIAZEPOXIDE HYDROCHLORIDE 25 MG: 25 CAPSULE ORAL at 10:08

## 2022-08-25 RX ADMIN — PANTOPRAZOLE SODIUM 40 MG: 40 TABLET, DELAYED RELEASE ORAL at 09:08

## 2022-08-25 RX ADMIN — THERA TABS 1 TABLET: TAB at 09:08

## 2022-08-25 RX ADMIN — FOLIC ACID 1 MG: 1 TABLET ORAL at 09:08

## 2022-08-25 RX ADMIN — DOCUSATE SODIUM 100 MG: 100 CAPSULE, LIQUID FILLED ORAL at 10:08

## 2022-08-25 RX ADMIN — SENNOSIDES AND DOCUSATE SODIUM 1 TABLET: 8.6; 5 TABLET ORAL at 09:08

## 2022-08-25 RX ADMIN — AMLODIPINE BESYLATE 10 MG: 5 TABLET ORAL at 09:08

## 2022-08-25 RX ADMIN — Medication 100 MG: at 09:08

## 2022-08-25 RX ADMIN — DOCUSATE SODIUM 100 MG: 100 CAPSULE, LIQUID FILLED ORAL at 09:08

## 2022-08-25 RX ADMIN — CHLORDIAZEPOXIDE HYDROCHLORIDE 25 MG: 25 CAPSULE ORAL at 09:08

## 2022-08-25 RX ADMIN — ESCITALOPRAM OXALATE 20 MG: 10 TABLET ORAL at 09:08

## 2022-08-25 RX ADMIN — ENOXAPARIN SODIUM 30 MG: 30 INJECTION SUBCUTANEOUS at 09:08

## 2022-08-25 RX ADMIN — HYDROCODONE BITARTRATE AND ACETAMINOPHEN 1 TABLET: 5; 325 TABLET ORAL at 07:08

## 2022-08-25 RX ADMIN — SENNOSIDES AND DOCUSATE SODIUM 1 TABLET: 8.6; 5 TABLET ORAL at 10:08

## 2022-08-25 RX ADMIN — ALPRAZOLAM 1 MG: 0.5 TABLET ORAL at 10:08

## 2022-08-25 RX ADMIN — HYDROCODONE BITARTRATE AND ACETAMINOPHEN 1 TABLET: 5; 325 TABLET ORAL at 10:08

## 2022-08-25 RX ADMIN — HYDROCODONE BITARTRATE AND ACETAMINOPHEN 1 TABLET: 5; 325 TABLET ORAL at 05:08

## 2022-08-25 RX ADMIN — HYDROCODONE BITARTRATE AND ACETAMINOPHEN 1 TABLET: 5; 325 TABLET ORAL at 11:08

## 2022-08-25 RX ADMIN — ENOXAPARIN SODIUM 30 MG: 30 INJECTION SUBCUTANEOUS at 10:08

## 2022-08-25 NOTE — PROGRESS NOTES
[facility]  Orthopedics  Progress Note    Patient Name: Mary Lou Middleton  MRN: [id number]  Admission Date: 8/23/2022  Hospital Length of Stay: 2 days  Attending Provider: Maicol pSain Jr., MD  Primary Care Provider: Rosalinda Torres NP  Follow-up For: Procedure(s) (LRB):  INSERTION, INTRAMEDULLARY TERENCE, FEMUR (Left)    Post-Operative Day: 1 Day Post-Op  Subjective:     Principal Problem:Displaced spiral fracture of shaft of left femur, initial encounter for closed fracture    Principal Orthopedic Problem: same    Interval History: POD 1 IMN L femur. No acute events over night. Pain controlled with meds. No new complaints/issues today.    Review of patient's allergies indicates:  No Known Allergies    Current Facility-Administered Medications   Medication    acetaminophen tablet 650 mg    ALPRAZolam tablet 1 mg    aluminum-magnesium hydroxide-simethicone 200-200-20 mg/5 mL suspension 30 mL    amLODIPine tablet 10 mg    bisacodyL suppository 10 mg    chlordiazepoxide capsule 25 mg    docusate sodium capsule 100 mg    enoxaparin injection 30 mg    EScitalopram oxalate tablet 20 mg    folic acid tablet 1 mg    HYDROcodone-acetaminophen 5-325 mg per tablet 1 tablet    HYDROmorphone (PF) injection 1 mg    lorazepam injection 1 mg    melatonin tablet 6 mg    methocarbamoL tablet 500 mg    morphine injection 2 mg    multivitamin tablet    ondansetron 4 mg/5 mL solution 4 mg    ondansetron injection 4 mg    pantoprazole EC tablet 40 mg    polyethylene glycol packet 17 g    senna-docusate 8.6-50 mg per tablet 1 tablet    sodium chloride 0.9% flush 10 mL    thiamine tablet 100 mg     Objective:     Vital Signs (Most Recent):  Temp: 98.2 °F (36.8 °C) (08/25/22 0736)  Pulse: 83 (08/25/22 0736)  Resp: 18 (08/25/22 0739)  BP: 119/68 (08/25/22 0736)  SpO2: (!) 90 % (08/25/22 0736) Vital Signs (24h Range):  Temp:  [97.7 °F (36.5 °C)-98.5 °F (36.9 °C)] 98.2 °F (36.8 °C)  Pulse:   "[68-95] 83  Resp:  [14-20] 18  SpO2:  [84 %-99 %] 90 %  BP: ()/(55-90) 119/68     Weight: 72.6 kg (160 lb)  Height: 5' 5" (165.1 cm)  Body mass index is 26.63 kg/m².      Intake/Output Summary (Last 24 hours) at 8/25/2022 0950  Last data filed at 8/24/2022 1400  Gross per 24 hour   Intake 290 ml   Output --   Net 290 ml       Ortho/SPM Exam   General:  AAOx4. Well nourished, well perfused, no distress.   L LE  Dressings clean, dry, intact  Thigh with mild swelling but soft and compressible  Tolerates passive ROM of hip without pain  Soreness with passive knee ROM as expected  No calf tenderness  5/5 motor strength distally with dorsi/plantar flexion  BCR distally  SLT intact distally    Significant Labs:   Recent Lab Results       08/25/22  0345        Anion Gap 10.0       Baso # 0.03       Basophil % 0.2       BUN 12.6       BUN/CREAT RATIO 17       Calcium 8.7       Chloride 104       CO2 25       Creatinine 0.75       eGFR >60       Eos # 0.00       Eosinophil % 0.0       Glucose 94       Hematocrit 36.3       Hemoglobin 11.4       Immature Grans (Abs) 0.06       Immature Granulocytes 0.4       Lymph # 2.83       LYMPH % 20.0       MCH 32.3       MCHC 31.4       .8       Mono # 1.13       Mono % 8.0       MPV 10.9       Neut # 10.1       Neut % 71.4       nRBC 0.0       Platelets 230       Potassium 4.0       RBC 3.53       RDW 14.2       Sodium 139       WBC 14.1           All pertinent labs within the past 24 hours have been reviewed.    Significant Imaging: X-Ray: I have reviewed all pertinent results/findings and my personal findings are:  post op xray L femur demonstrates good fracture alignment with all hardware intact    Assessment/Plan:     Active Diagnoses:    Diagnosis Date Noted POA    PRINCIPAL PROBLEM:  Displaced spiral fracture of shaft of left femur, initial encounter for closed fracture [S72.342A] 08/24/2022 Yes      Problems Resolved During this Admission:     Pt is doing well s/p " IMN L Femur.H&H stable post op. Pain controlled. Plan for PT today. She can WBAT to L LE to stand/pivot for transfers; no ambulation. Ok for full ROM to L LE. Abx x23 hrs post op. Continue judicious pain mgmt. Lovenox for dvt ppx. Plan to begin dressing changes tomorrow. Will need CM eval for placement.     I, Annelise Duval, NP, have scribed this note in the presence of Dr. Silverio Vaughn who has personally examined the patient and initiated the plan of care.       ALISHA Medrano  Orthopedics  [facility]

## 2022-08-25 NOTE — PLAN OF CARE
Pt tells me that she slipped on wet wood and this resulted in fx. She lives alone, was independent and used no equipment prior.   Pt  would like inpt rehab. We discussed therapy and they would make recommendations. We discussed the area inpt rehabs. She would like [facility] , referral sent.   Pts insurer is [employer]. Her address is [address] [place] [address] in [place]. Home has two steps to enter.   Brief intervention done with pt who declined info.  Will follow for therapy recommendations.

## 2022-08-25 NOTE — PT/OT/SLP PROGRESS
Physical Therapy Treatment    Patient Name:  Mary Lou Middleton   MRN:  [id number]    Recommendations:     Discharge Recommendations:  home with home health, rehabilitation facility   Discharge Equipment Recommendations: walker, rolling, wheelchair   Barriers to discharge: acuity of illness    Assessment:     Mary Lou Middleton is a 71 y.o. female admitted with a medical diagnosis of Displaced spiral fracture of shaft of left femur, initial encounter for closed fracture.  She presents with the following impairments/functional limitations:  weakness, impaired endurance, impaired functional mobility, impaired balance, decreased lower extremity function, pain, decreased ROM, orthopedic precautions.    Rehab Prognosis: Good; patient would benefit from acute skilled PT services to address these deficits and reach maximum level of function.    Recent Surgery: Procedure(s) (LRB):  INSERTION, INTRAMEDULLARY TERENCE, FEMUR (Left) 1 Day Post-Op    Plan:     During this hospitalization, patient to be seen daily to address the identified rehab impairments via therapeutic activities, therapeutic exercises, neuromuscular re-education, wheelchair management/training and progress toward the following goals:    · Plan of Care Expires:  09/24/22    Subjective     Chief Complaint: pain  Patient/Family Comments/goals: to be independent  Pain/Comfort:  · Pain Rating 1: 10/10  · Location - Side 1: Left  · Location 1: leg  · Pain Addressed 1: Nurse notified      Objective:     Communicated with RN prior to session.  Patient found HOB elevated with peripheral IV upon PT entry to room.     General Precautions: Standard,     Orthopedic Precautions:LLE WBAT (FOR TF ONLY, NO AMBULATION)   Braces: N/A  Respiratory Status: Room air     Functional Mobility:  · Bed Mobility:     · Scooting: minimum assistance  · Supine to Sit: stand by assistance  · Transfers:     · Sit to Stand:  minimum assistance with rolling walker  · Bed to Chair: minimum assistance  with  rolling walker  using  Stand Pivot      AM-PAC 6 CLICK MOBILITY  Turning over in bed (including adjusting bedclothes, sheets and blankets)?: 3  Sitting down on and standing up from a chair with arms (e.g., wheelchair, bedside commode, etc.): 3  Moving from lying on back to sitting on the side of the bed?: 3  Moving to and from a bed to a chair (including a wheelchair)?: 3  Need to walk in hospital room?: 1  Climbing 3-5 steps with a railing?: 1  Basic Mobility Total Score: 14       Patient left up in chair with all lines intact, call button in reach and RN notified..    GOALS:   Multidisciplinary Problems     Physical Therapy Goals        Problem: Physical Therapy    Goal Priority Disciplines Outcome Goal Variances Interventions   Physical Therapy Goal     PT, PT/OT Ongoing, Progressing     Description: Goals to be met by: 2022     Patient will increase functional independence with mobility by performin. Supine to sit with Modified [place]  2. Sit to supine with Modified [place]  3. Sit to stand transfer with Modified [place]  4. Wheelchair propulsion x500 feet with Modified [place] using bilateral uppper extremities                     Time Tracking:     PT Received On: 22  PT Start Time: 841     PT Stop Time: 851  PT Total Time (min): 10 min     Billable Minutes: Therapeutic Activity 10 mins    Treatment Type: Treatment  PT/PTA: PT     PTA Visit Number: 1     2022

## 2022-08-25 NOTE — PROGRESS NOTES
[facility] Hospital Medicine consultation f/u Note        Chief Complaint: Inpatient Follow-up for fall down the stairs with left mid shaft femur fracture     HPI: 71-year-old female with known past medical history of hypertension, anxiety, nicotine dependence, regular alcohol use admitted 08/23 with a diagnosis of comminuted left femoral shaft fracture due to fall [place] her [place].  Patient reports she fell 5 steps down in the front of her house, patient reports she lives alone and at the time she had to scoot herself to the phone to call the EMS.  Patient denies hitting her head or loss of consciousness.   Patient states she smokes at least 1 pack a day, drinks 3-4 glasses of liquor daily and last consumption was around 7 or 8:00 p.m. last night.  Denies any drug use.  Reported no history of alcohol withdrawals in the past when she would not drink for few days   States she takes amlodipine, hydrochlorothiazide, Lexapro and Xanax at night.  Reported she does not remember the doses of amlodipine, HCTZ.  Stated her PCP increase the dose of Lexapro from 10-20 mg and she thinks that she takes 10 mg of Xanax at night.  I informed patient that Xanax does not come in 10 mg.  Brother at bedside stated he will call home and check the doses informed the patient's nurse in ED   In the ED she went x-ray of the left femur which showed mid shaft fracture.   Patient is admitted to orthopedic service for surgical intervention.  Hospital medicine consulted for medical management.   Orthopedics was consulted patient is status post intramedullary nailing the left femur orthopedics following they recommended weight-bearing as tolerated to left lower extremity to stand and 5 overt for transfers no ambulation okay for full range of motion to left lower extremity  Interval Hx:   Patient seen and examined this morning reports pain is controlled    Objective/physical exam:  General: In no acute  distress, afebrile, laying in bed, mild head tremor noted   Chest: Clear to auscultation bilaterally   Heart: S1, S2, no appreciable murmur   Abdomen: Soft, nontender, BS +   MSK:  Decreased range of motion in the left lower extremity  Neurologic: Alert and oriented x4,     VITAL SIGNS: 24 HRS MIN & MAX LAST   Temp  Min: 97.7 °F (36.5 °C)  Max: 98.5 °F (36.9 °C) 98.2 °F (36.8 °C)   BP  Min: 88/55  Max: 160/89 119/68   Pulse  Min: 68  Max: 95  83   Resp  Min: 14  Max: 20 18   SpO2  Min: 84 %  Max: 99 % (!) 90 %       Recent Labs   Lab 08/23/22  1308 08/24/22  0353 08/25/22  0345   WBC 14.8* 10.5 14.1*   RBC 4.71 4.12* 3.53*   HGB 15.2 13.4 11.4*   HCT 47.3* 41.3 36.3*   .4* 100.2* 102.8*   MCH 32.3* 32.5* 32.3*   MCHC 32.1* 32.4* 31.4*   RDW 14.0 14.1 14.2    256 230   MPV 10.4 10.5* 10.9*       Recent Labs   Lab 08/23/22  1308 08/24/22  0354 08/25/22  0345   * 139 139   K 3.2* 4.0 4.0   CO2 24 30 25   BUN 9.0* 5.7* 12.6   CREATININE 0.72 0.66 0.75   CALCIUM 9.5 9.1 8.7   ALBUMIN 3.6 2.9*  --    ALKPHOS 94 75  --    ALT 38 29  --    AST 41* 31  --    BILITOT 0.4 0.6  --           Microbiology Results (last 7 days)     ** No results found for the last 168 hours. **           See below for Radiology    Scheduled Med:   amLODIPine  10 mg Oral Daily    chlordiazepoxide  25 mg Oral BID    chlordiazepoxide  25 mg Oral Once    docusate sodium  100 mg Oral BID    enoxaparin  30 mg Subcutaneous Q12H    EScitalopram oxalate  20 mg Oral Daily    folic acid  1 mg Oral Daily    multivitamin  1 tablet Oral Daily    pantoprazole  40 mg Oral Daily    senna-docusate 8.6-50 mg  1 tablet Oral BID    thiamine  100 mg Oral Daily        Continuous Infusions:       PRN Meds:  acetaminophen, ALPRAZolam, aluminum-magnesium hydroxide-simethicone, bisacodyL, HYDROcodone-acetaminophen, HYDROmorphone, lorazepam, melatonin, methocarbamoL, morphine, ondansetron, ondansetron, polyethylene glycol, sodium chloride 0.9%        Assessment/Plan:  Comminuted left femoral shaft fracture due to fall at home   status post intramedullary nailing   fall  Hypokalemia/hyponatremia   Alcohol abuse - monitor for alcohol withdrawal   Tobacco abuse   History of hypertension    History of anxiety   DNR status           patient is status post intramedullary nailing done on August 24th tolerated the procedure well   PT and OT consulted  Pain is controlled  On Librium for now, continue with thiamine and folate and multivitamin   We will see how patient does with physical therapy accordingly will make discharge disposition  Orthopedics following   Repeat blood work in a.m.  Blood pressure is on the lower side so I will hold off on Norvasc      VTE prophylaxis:  Lovenox  Patient condition:  Stable    Anticipated discharge and Disposition:    Probable rehab      All diagnosis and differential diagnosis have been reviewed; assessment and plan has been documented; I have personally reviewed the labs and test results that are presently available; I have reviewed the patients medication list; I have reviewed the consulting providers response and recommendations. I have reviewed or attempted to review medical records based upon their availability    All of the patient's questions have been  addressed and answered. Patient's is agreeable to the above stated plan. I will continue to monitor closely and make adjustments to medical management as needed.  _____________________________________________________________________    Nutrition Status:    Radiology:  SURG FL Surgery Fluoro Usage  See OP Notes for results.     IMPRESSION: See OP Notes for results.     This procedure was auto-finalized by: Virtual Radiologist  X-Ray Femur 2 View Left  Narrative: EXAMINATION:  XR FEMUR 2 VIEW LEFT    CPT: 22879    CLINICAL HISTORY:  post-op;    FINDINGS:  There has been interval insertion of an intramedullary alva traversing a fracture of the midshaft and distal aspect of  the femur position and alignment of the visualized osseous structures is anatomical multiple orthopedic screws identified in the distal femur  Impression: Internal fixation    Electronically signed by: Niles Peacock  Date:    08/24/2022  Time:    12:41      Jimena Casper MD   08/25/2022

## 2022-08-25 NOTE — PLAN OF CARE
Problem: Adult Inpatient Plan of Care  Goal: Plan of Care Review  Outcome: Ongoing, Progressing  Flowsheets (Taken 8/25/2022 0118)  Plan of Care Reviewed With: patient  Goal: Patient-Specific Goal (Individualized)  Outcome: Ongoing, Progressing  Goal: Absence of Hospital-Acquired Illness or Injury  Outcome: Ongoing, Progressing  Goal: Optimal Comfort and Wellbeing  Outcome: Ongoing, Progressing  Goal: Readiness for Transition of Care  Outcome: Ongoing, Progressing     Problem: Skin Injury Risk Increased  Goal: Skin Health and Integrity  Outcome: Ongoing, Progressing     Problem: Infection  Goal: Absence of Infection Signs and Symptoms  Outcome: Ongoing, Progressing

## 2022-08-26 LAB
ANION GAP SERPL CALC-SCNC: 9 MEQ/L
BASOPHILS # BLD AUTO: 0.07 X10(3)/MCL (ref 0–0.2)
BASOPHILS NFR BLD AUTO: 0.6 %
BUN SERPL-MCNC: 16.9 MG/DL (ref 9.8–20.1)
CALCIUM SERPL-MCNC: 9 MG/DL (ref 8.4–10.2)
CHLORIDE SERPL-SCNC: 100 MMOL/L (ref 98–107)
CO2 SERPL-SCNC: 28 MMOL/L (ref 23–31)
CREAT SERPL-MCNC: 0.71 MG/DL (ref 0.55–1.02)
CREAT/UREA NIT SERPL: 24
EOSINOPHIL # BLD AUTO: 0.08 X10(3)/MCL (ref 0–0.9)
EOSINOPHIL NFR BLD AUTO: 0.7 %
ERYTHROCYTE [DISTWIDTH] IN BLOOD BY AUTOMATED COUNT: 14.3 % (ref 11.5–17)
GFR SERPLBLD CREATININE-BSD FMLA CKD-EPI: >60 MLS/MIN/1.73/M2
GLUCOSE SERPL-MCNC: 93 MG/DL (ref 82–115)
HCT VFR BLD AUTO: 31 % (ref 37–47)
HGB BLD-MCNC: 9.6 GM/DL (ref 12–16)
IMM GRANULOCYTES # BLD AUTO: 0.05 X10(3)/MCL (ref 0–0.04)
IMM GRANULOCYTES NFR BLD AUTO: 0.5 %
LYMPHOCYTES # BLD AUTO: 3.92 X10(3)/MCL (ref 0.6–4.6)
LYMPHOCYTES NFR BLD AUTO: 35.5 %
MCH RBC QN AUTO: 32 PG (ref 27–31)
MCHC RBC AUTO-ENTMCNC: 31 MG/DL (ref 33–36)
MCV RBC AUTO: 103.3 FL (ref 80–94)
MONOCYTES # BLD AUTO: 0.88 X10(3)/MCL (ref 0.1–1.3)
MONOCYTES NFR BLD AUTO: 8 %
NEUTROPHILS # BLD AUTO: 6 X10(3)/MCL (ref 2.1–9.2)
NEUTROPHILS NFR BLD AUTO: 54.7 %
NRBC BLD AUTO-RTO: 0 %
PLATELET # BLD AUTO: 172 X10(3)/MCL (ref 130–400)
PMV BLD AUTO: 11.6 FL (ref 7.4–10.4)
POTASSIUM SERPL-SCNC: 3.5 MMOL/L (ref 3.5–5.1)
RBC # BLD AUTO: 3 X10(6)/MCL (ref 4.2–5.4)
SODIUM SERPL-SCNC: 137 MMOL/L (ref 136–145)
WBC # SPEC AUTO: 11 X10(3)/MCL (ref 4.5–11.5)

## 2022-08-26 PROCEDURE — 97530 THERAPEUTIC ACTIVITIES: CPT

## 2022-08-26 PROCEDURE — 85025 COMPLETE CBC W/AUTO DIFF WBC: CPT | Performed by: NURSE PRACTITIONER

## 2022-08-26 PROCEDURE — 11000001 HC ACUTE MED/SURG PRIVATE ROOM

## 2022-08-26 PROCEDURE — 25000003 PHARM REV CODE 250: Performed by: NURSE PRACTITIONER

## 2022-08-26 PROCEDURE — 25000003 PHARM REV CODE 250: Performed by: PHYSICIAN ASSISTANT

## 2022-08-26 PROCEDURE — 63600175 PHARM REV CODE 636 W HCPCS: Performed by: NURSE PRACTITIONER

## 2022-08-26 PROCEDURE — 97535 SELF CARE MNGMENT TRAINING: CPT | Mod: CO

## 2022-08-26 PROCEDURE — 25000003 PHARM REV CODE 250: Performed by: EMERGENCY MEDICINE

## 2022-08-26 PROCEDURE — 27000221 HC OXYGEN, UP TO 24 HOURS

## 2022-08-26 PROCEDURE — 25000003 PHARM REV CODE 250: Performed by: INTERNAL MEDICINE

## 2022-08-26 PROCEDURE — 80048 BASIC METABOLIC PNL TOTAL CA: CPT | Performed by: NURSE PRACTITIONER

## 2022-08-26 PROCEDURE — 36415 COLL VENOUS BLD VENIPUNCTURE: CPT | Performed by: NURSE PRACTITIONER

## 2022-08-26 RX ADMIN — DOCUSATE SODIUM 100 MG: 100 CAPSULE, LIQUID FILLED ORAL at 09:08

## 2022-08-26 RX ADMIN — ENOXAPARIN SODIUM 30 MG: 30 INJECTION SUBCUTANEOUS at 08:08

## 2022-08-26 RX ADMIN — ALPRAZOLAM 1 MG: 0.5 TABLET ORAL at 08:08

## 2022-08-26 RX ADMIN — SENNOSIDES AND DOCUSATE SODIUM 1 TABLET: 8.6; 5 TABLET ORAL at 08:08

## 2022-08-26 RX ADMIN — ENOXAPARIN SODIUM 30 MG: 30 INJECTION SUBCUTANEOUS at 09:08

## 2022-08-26 RX ADMIN — PANTOPRAZOLE SODIUM 40 MG: 40 TABLET, DELAYED RELEASE ORAL at 09:08

## 2022-08-26 RX ADMIN — HYDROCODONE BITARTRATE AND ACETAMINOPHEN 1 TABLET: 5; 325 TABLET ORAL at 09:08

## 2022-08-26 RX ADMIN — HYDROCODONE BITARTRATE AND ACETAMINOPHEN 1 TABLET: 5; 325 TABLET ORAL at 07:08

## 2022-08-26 RX ADMIN — FOLIC ACID 1 MG: 1 TABLET ORAL at 09:08

## 2022-08-26 RX ADMIN — ESCITALOPRAM OXALATE 20 MG: 10 TABLET ORAL at 09:08

## 2022-08-26 RX ADMIN — Medication 100 MG: at 09:08

## 2022-08-26 RX ADMIN — THERA TABS 1 TABLET: TAB at 09:08

## 2022-08-26 RX ADMIN — DOCUSATE SODIUM 100 MG: 100 CAPSULE, LIQUID FILLED ORAL at 08:08

## 2022-08-26 RX ADMIN — SENNOSIDES AND DOCUSATE SODIUM 1 TABLET: 8.6; 5 TABLET ORAL at 09:08

## 2022-08-26 NOTE — PT/OT/SLP PROGRESS
Physical Therapy Treatment    Patient Name:  Mary Lou Middleton   MRN:  [id number]    Recommendations:     Discharge Recommendations:  rehabilitation facility   Discharge Equipment Recommendations: wheelchair, walker, rolling   Barriers to discharge: acuity of illness    Assessment:     Mary Lou Middleton is a 71 y.o. female admitted with a medical diagnosis of Displaced spiral fracture of shaft of left femur, initial encounter for closed fracture.  She presents with the following impairments/functional limitations:  weakness, impaired endurance, impaired functional mobility, decreased lower extremity function, pain, decreased ROM, orthopedic precautions.Pt required increased assistance to perform transfers this date.     Rehab Prognosis: Good; patient would benefit from acute skilled PT services to address these deficits and reach maximum level of function.    Recent Surgery: Procedure(s) (LRB):  INSERTION, INTRAMEDULLARY TERENCE, FEMUR (Left) 2 Days Post-Op    Plan:     During this hospitalization, patient to be seen daily to address the identified rehab impairments via therapeutic activities, therapeutic exercises, neuromuscular re-education, wheelchair management/training and progress toward the following goals:    · Plan of Care Expires:  09/23/22    Subjective     Chief Complaint: pain  Patient/Family Comments/goals: to get stronger and be independent  Pain/Comfort:  · Pain Rating 1: 5/10  · Location - Side 1: Left  · Location 1: leg  · Pain Addressed 1: Nurse notified      Objective:     Communicated with RN prior to session.  Patient found up in chair with peripheral IV upon PT entry to room.     General Precautions: Standard,     Orthopedic Precautions:LLE weight bearing as tolerated (for transfer only, no ambulation)   Braces: N/A  Respiratory Status: Room air     Functional Mobility:  · Bed Mobility:     · Sit to Supine: moderate assistance  · Transfers:     · Sit to Stand:  moderate assistance with rolling  walker. Pt performed WC to bed tf w/ modA and use of RW  · Wheelchair Propulsion:  Pt propelled Standard wheelchair x 150 feet on Level tile with  Bilateral upper extremity with Stand-by Assistance.       AM-PAC 6 CLICK MOBILITY  Turning over in bed (including adjusting bedclothes, sheets and blankets)?: 3  Sitting down on and standing up from a chair with arms (e.g., wheelchair, bedside commode, etc.): 2  Moving from lying on back to sitting on the side of the bed?: 3  Moving to and from a bed to a chair (including a wheelchair)?: 2  Need to walk in hospital room?: 1  Climbing 3-5 steps with a railing?: 1  Basic Mobility Total Score: 12       Patient left HOB elevated with all lines intact, call button in reach and RN notified..    GOALS:   Multidisciplinary Problems     Physical Therapy Goals        Problem: Physical Therapy    Goal Priority Disciplines Outcome Goal Variances Interventions   Physical Therapy Goal     PT, PT/OT Ongoing, Progressing     Description: Goals to be met by: 2022     Patient will increase functional independence with mobility by performin. Supine to sit with Modified [place]  2. Sit to supine with Modified [place]  3. Sit to stand transfer with Modified [place]  4. Wheelchair propulsion x500 feet with Modified [place] using bilateral uppper extremities                     Time Tracking:     PT Received On: 22  PT Start Time: 1015     PT Stop Time: 1033  PT Total Time (min): 18 min     Billable Minutes: Therapeutic Activity 18 mins    Treatment Type: Treatment  PT/PTA: PT     PTA Visit Number: 2     2022

## 2022-08-26 NOTE — PROGRESS NOTES
[facility] Hospital Medicine consultation f/u Note        Chief Complaint: Inpatient Follow-up for fall down the stairs with left mid shaft femur fracture     HPI: 71-year-old female with known past medical history of hypertension, anxiety, nicotine dependence, regular alcohol use admitted 08/23 with a diagnosis of comminuted left femoral shaft fracture due to fall at her [place] the steps.  Patient reports she fell 5 steps down in the front of her house, patient reports she lives alone and at the time she had to scoot herself to the phone to call the EMS.  Patient denies hitting her head or loss of consciousness.   Patient states she smokes at least 1 pack a day, drinks 3-4 glasses of liquor daily and last consumption was around 7 or 8:00 p.m. last night.  Denies any drug use.  Reported no history of alcohol withdrawals in the past when she would not drink for few days   States she takes amlodipine, hydrochlorothiazide, Lexapro and Xanax at night.  Reported she does not remember the doses of amlodipine, HCTZ.  Stated her PCP increase the dose of Lexapro from 10-20 mg and she thinks that she takes 10 mg of Xanax at night.  I informed patient that Xanax does not come in 10 mg.  Brother at bedside stated he will call home and check the doses informed the patient's nurse in ED   In the ED she went x-ray of the left femur which showed mid shaft fracture.   Patient is admitted to orthopedic service for surgical intervention.  Hospital medicine consulted for medical management.   Orthopedics was consulted patient is status post intramedullary nailing the left femur orthopedics following they recommended weight-bearing as tolerated to left lower extremity to stand and 5 overt for transfers no ambulation okay for full range of motion to left lower extremity  Interval Hx:   Patient seen and examined this morning reports pain is controlled no other issues reported work with physical therapy  yesterday  Objective/physical exam:  General: In no acute distress, afebrile, laying in bed, mild head tremor noted   Chest: Clear to auscultation bilaterally   Heart: S1, S2, no appreciable murmur   Abdomen: Soft, nontender, BS +   MSK:  Decreased range of motion in the left lower extremity  Neurologic: Alert and oriented x4,     VITAL SIGNS: 24 HRS MIN & MAX LAST   Temp  Min: 98.1 °F (36.7 °C)  Max: 99.4 °F (37.4 °C) 98.1 °F (36.7 °C)   BP  Min: 105/64  Max: 139/70 129/67   Pulse  Min: 81  Max: 91  81   Resp  Min: 16  Max: 20 16   SpO2  Min: 83 %  Max: 94 % (!) 90 %       Recent Labs   Lab 08/24/22  0353 08/25/22  0345 08/26/22  0410   WBC 10.5 14.1* 11.0   RBC 4.12* 3.53* 3.00*   HGB 13.4 11.4* 9.6*   HCT 41.3 36.3* 31.0*   .2* 102.8* 103.3*   MCH 32.5* 32.3* 32.0*   MCHC 32.4* 31.4* 31.0*   RDW 14.1 14.2 14.3    230 172   MPV 10.5* 10.9* 11.6*       Recent Labs   Lab 08/23/22  1308 08/24/22  0354 08/25/22  0345 08/26/22  0410   * 139 139 137   K 3.2* 4.0 4.0 3.5   CO2 24 30 25 28   BUN 9.0* 5.7* 12.6 16.9   CREATININE 0.72 0.66 0.75 0.71   CALCIUM 9.5 9.1 8.7 9.0   ALBUMIN 3.6 2.9*  --   --    ALKPHOS 94 75  --   --    ALT 38 29  --   --    AST 41* 31  --   --    BILITOT 0.4 0.6  --   --           Microbiology Results (last 7 days)     ** No results found for the last 168 hours. **           See below for Radiology    Scheduled Med:   docusate sodium  100 mg Oral BID    enoxaparin  30 mg Subcutaneous Q12H    EScitalopram oxalate  20 mg Oral Daily    folic acid  1 mg Oral Daily    multivitamin  1 tablet Oral Daily    pantoprazole  40 mg Oral Daily    senna-docusate 8.6-50 mg  1 tablet Oral BID    thiamine  100 mg Oral Daily        Continuous Infusions:       PRN Meds:  acetaminophen, ALPRAZolam, aluminum-magnesium hydroxide-simethicone, bisacodyL, HYDROcodone-acetaminophen, HYDROmorphone, lorazepam, melatonin, methocarbamoL, morphine, ondansetron, ondansetron, polyethylene glycol,  sodium chloride 0.9%       Assessment/Plan:  Comminuted left femoral shaft fracture due to fall at home   status post intramedullary nailing   fall  Hypokalemia/hyponatremia   Alcohol abuse - monitor for alcohol withdrawal   Tobacco abuse   History of hypertension    History of anxiety   DNR status        patient is status post intramedullary nailing done on August 24th tolerated the procedure well   PT and OT consulted  Pain is controlled  On Librium for now, continue with thiamine and folate and multivitamin   Orthopedics following   Repeat blood work in a.m.  [facility]  Case management consulted for rehab placement      VTE prophylaxis:  Lovenox  Patient condition:  Stable    Anticipated discharge and Disposition:    Probable rehab      All diagnosis and differential diagnosis have been reviewed; assessment and plan has been documented; I have personally reviewed the labs and test results that are presently available; I have reviewed the patients medication list; I have reviewed the consulting providers response and recommendations. I have reviewed or attempted to review medical records based upon their availability    All of the patient's questions have been  addressed and answered. Patient's is agreeable to the above stated plan. I will continue to monitor closely and make adjustments to medical management as needed.  _____________________________________________________________________    Nutrition Status:    Radiology:  SURG FL Surgery Fluoro Usage  See OP Notes for results.     IMPRESSION: See OP Notes for results.     This procedure was auto-finalized by: Virtual Radiologist  X-Ray Femur 2 View Left  Narrative: EXAMINATION:  XR FEMUR 2 VIEW LEFT    CPT: 44337    CLINICAL HISTORY:  post-op;    FINDINGS:  There has been interval insertion of an intramedullary alva traversing a fracture of the midshaft and distal aspect of the femur position and alignment of the visualized osseous  structures is anatomical multiple orthopedic screws identified in the distal femur  Impression: Internal fixation    Electronically signed by: Niles Peacock  Date:    08/24/2022  Time:    12:41      Jimena Casper MD   08/26/2022

## 2022-08-26 NOTE — PT/OT/SLP PROGRESS
Occupational Therapy  Treatment    Mary Lou Middleton   MRN: [id number]   Admitting Diagnosis: Displaced spiral fracture of shaft of left femur, initial encounter for closed fracture    OT Date of Treatment: 08/26/22   OT Start Time: 0935  OT Stop Time: 1002  OT Total Time (min): 27 min     Billable Minutes:  Self Care/Home Management 27  Total Minutes: 27     OT/JOSSELYN: JOSSELYN     JOSSELYN Visit Number: 1    General Precautions: Standard, fall  Orthopedic Precautions: LLE weight bearing as tolerated (t/f only)  Braces:      Spiritual, Cultural Beliefs, Islam Practices, Values that Affect Care: no    Subjective:  In good spirits and agreeable to OT session.          Objective:  Patient found with: peripheral IV    Functional Mobility:  Bed Mobility:   Supine to sit: Minimal Assistance   Sit to supine: Activity did not occur   Rolling: Activity did not occur   Scooting: Minimal Assistance    Transfer Training:  Stand pivot t/f BSC> BS recliner. 2/2 pain, BS recliner positioned behind pt for safety.     UE Dressing:  SBA [facility] gown    Toilet Training:  Min A stand step t/f EOB>BSC. Min A for pericare in sitting.     Balance:   Static Sit: GOOD-: Takes MODERATE challenges from all directions but inconsistently  Dynamic Sit:  FAIR+: Maintains balance through MINIMAL excursions of active trunk motion  Static Stand: POOR+: Needs MINIMAL assist to maintain  Dynamic stand: 0: N/A    Additional Treatment:      Patient left up in chair with all lines intact, call button in reach and CNA present    ASSESSMENT:  Mary Lou Middleton is a 71 y.o. female with a medical diagnosis of Displaced spiral fracture of shaft of left femur, initial encounter for closed fracture. Progressing well, limited by pain but demo's good effort and motivation. Pt would benefit from IPR to increase strength and endurance prior to return home.     Rehab potential is good    Activity tolerance: Good    Discharge recommendations: rehabilitation facility      Equipment recommendations:       GOALS:   Multidisciplinary Problems     Occupational Therapy Goals        Problem: Occupational Therapy    Goal Priority Disciplines Outcome Interventions   Occupational Therapy Goal    Medium OT, PT/OT Ongoing, Progressing    Description: Patient will perform toileting with min assist using BSC    Patient will perform toilet/BSC transfer with mod assist with use of RW    Patient will perform lower body dressing with min assist while seated EOB, with use of DME                      Plan:  Patient to be seen 3 x/week, 5 x/week to address the above listed problems via therapeutic exercises, therapeutic activities  Plan of Care expires: 09/07/22  Plan of Care reviewed with: patient         08/26/2022

## 2022-08-26 NOTE — PROGRESS NOTES
[facility] - [facility]  Orthopedics  Progress Note    Patient Name: Mary Lou Middleton  MRN: [id number]  Admission Date: 8/23/2022  Hospital Length of Stay: 3 days  Attending Provider: No att. providers found  Primary Care Provider: Rosalinda Torres NP  Follow-up For: Procedure(s) (LRB):  INSERTION, INTRAMEDULLARY TERENCE, FEMUR (Left)    Post-Operative Day: 2 Day Post-Op  Subjective:     Principal Problem:Displaced spiral fracture of shaft of left femur, initial encounter for closed fracture    Principal Orthopedic Problem: same    Interval History: POD 2 IMN L femur. No acute events over night. Pain controlled with meds. No new complaints/issues today. States she stood and pivoted to transfer with PT yesterday. States she sat in her chair for 4 hours and worked on knee ROM exercises.     Review of patient's allergies indicates:  No Known Allergies    Current Facility-Administered Medications   Medication    acetaminophen tablet 650 mg    ALPRAZolam tablet 1 mg    aluminum-magnesium hydroxide-simethicone 200-200-20 mg/5 mL suspension 30 mL    bisacodyL suppository 10 mg    docusate sodium capsule 100 mg    enoxaparin injection 30 mg    EScitalopram oxalate tablet 20 mg    folic acid tablet 1 mg    HYDROcodone-acetaminophen 5-325 mg per tablet 1 tablet    HYDROmorphone (PF) injection 1 mg    lorazepam injection 1 mg    melatonin tablet 6 mg    methocarbamoL tablet 500 mg    morphine injection 2 mg    multivitamin tablet    ondansetron 4 mg/5 mL solution 4 mg    ondansetron injection 4 mg    pantoprazole EC tablet 40 mg    polyethylene glycol packet 17 g    senna-docusate 8.6-50 mg per tablet 1 tablet    sodium chloride 0.9% flush 10 mL    thiamine tablet 100 mg     Objective:     Vital Signs (Most Recent):  Temp: 98.1 °F (36.7 °C) (08/26/22 0449)  Pulse: 84 (08/26/22 0449)  Resp: 18 (08/25/22 2218)  BP: 123/63 (08/26/22 0449)  SpO2: (!) 91 % (08/26/22 0449) Vital Signs (24h  "Range):  Temp:  [98.1 °F (36.7 °C)-99.4 °F (37.4 °C)] 98.1 °F (36.7 °C)  Pulse:  [83-91] 84  Resp:  [18-20] 18  SpO2:  [83 %-94 %] 91 %  BP: (105-139)/(63-70) 123/63     Weight: 72.6 kg (160 lb)  Height: 5' 5" (165.1 cm)  Body mass index is 26.63 kg/m².      Intake/Output Summary (Last 24 hours) at 8/26/2022 0722  Last data filed at 8/25/2022 1200  Gross per 24 hour   Intake 480 ml   Output --   Net 480 ml       Ortho/SPM Exam   General:  AAOx4. Well nourished, well perfused, no distress.   L LE  Dressings with spots of serosanguinous drainage; removed for exam; incisions clean and dry with no erythema or active drainage  She has some swelling at the knee as expected  Thigh with mild swelling but soft and compressible  Tolerates gentle passive ROM of hip and knee  No calf tenderness  5/5 motor strength distally with dorsi/plantar flexion  BCR distally  SLT intact distally    Significant Labs:   Recent Lab Results       08/26/22  0410        Anion Gap 9.0       Baso # 0.07       Basophil % 0.6       BUN 16.9       BUN/CREAT RATIO 24       Calcium 9.0       Chloride 100       CO2 28       Creatinine 0.71       eGFR >60       Eos # 0.08       Eosinophil % 0.7       Glucose 93       Hematocrit 31.0       Hemoglobin 9.6       Immature Grans (Abs) 0.05       Immature Granulocytes 0.5       Lymph # 3.92       LYMPH % 35.5       MCH 32.0       MCHC 31.0       .3       Mono # 0.88       Mono % 8.0       MPV 11.6       Neut # 6.0       Neut % 54.7       nRBC 0.0       Platelets 172       Potassium 3.5       RBC 3.00       RDW 14.3       Sodium 137       WBC 11.0           All pertinent labs within the past 24 hours have been reviewed.    Significant Imaging: X-Ray: I have reviewed all pertinent results/findings and my personal findings are:  post op xray L femur demonstrates good fracture alignment with all hardware intact    Assessment/Plan:     Active Diagnoses:    Diagnosis Date Noted POA    PRINCIPAL PROBLEM:  " Displaced spiral fracture of shaft of left femur, initial encounter for closed fracture [S72.342A] 08/24/2022 Yes      Problems Resolved During this Admission:     Pt is doing well s/p IMN L Femur.H&H down a bit as expected post op. Pt asymptomatic with stable VS. Continue to monitor. Pain controlled. Continue PT daily. She can WBAT to L LE to stand/pivot for transfers; no ambulation. Ok for full ROM to L LE. Continue judicious pain mgmt. Lovenox for dvt ppx. Begin daily dry dressing changes to incisions today. Will need CM eval for placement. Follow up with Dr. Vaughn in 2 weeks for a wound check and staple removal. Please call with any questions or concerns.     The above findings, diagnosis, and treatment plan were discussed with Dr. Silverio Vaughn who is in agreement.

## 2022-08-27 LAB
ANION GAP SERPL CALC-SCNC: 8 MEQ/L
BASOPHILS # BLD AUTO: 0.06 X10(3)/MCL (ref 0–0.2)
BASOPHILS NFR BLD AUTO: 0.6 %
BUN SERPL-MCNC: 13.7 MG/DL (ref 9.8–20.1)
CALCIUM SERPL-MCNC: 9 MG/DL (ref 8.4–10.2)
CHLORIDE SERPL-SCNC: 101 MMOL/L (ref 98–107)
CO2 SERPL-SCNC: 29 MMOL/L (ref 23–31)
CREAT SERPL-MCNC: 0.61 MG/DL (ref 0.55–1.02)
CREAT/UREA NIT SERPL: 22
EOSINOPHIL # BLD AUTO: 0.18 X10(3)/MCL (ref 0–0.9)
EOSINOPHIL NFR BLD AUTO: 1.9 %
ERYTHROCYTE [DISTWIDTH] IN BLOOD BY AUTOMATED COUNT: 14.3 % (ref 11.5–17)
GFR SERPLBLD CREATININE-BSD FMLA CKD-EPI: >60 MLS/MIN/1.73/M2
GLUCOSE SERPL-MCNC: 101 MG/DL (ref 82–115)
HCT VFR BLD AUTO: 29.5 % (ref 37–47)
HGB BLD-MCNC: 9.3 GM/DL (ref 12–16)
IMM GRANULOCYTES # BLD AUTO: 0.04 X10(3)/MCL (ref 0–0.04)
IMM GRANULOCYTES NFR BLD AUTO: 0.4 %
LYMPHOCYTES # BLD AUTO: 3.04 X10(3)/MCL (ref 0.6–4.6)
LYMPHOCYTES NFR BLD AUTO: 32 %
MCH RBC QN AUTO: 32.4 PG (ref 27–31)
MCHC RBC AUTO-ENTMCNC: 31.5 MG/DL (ref 33–36)
MCV RBC AUTO: 102.8 FL (ref 80–94)
MONOCYTES # BLD AUTO: 0.78 X10(3)/MCL (ref 0.1–1.3)
MONOCYTES NFR BLD AUTO: 8.2 %
NEUTROPHILS # BLD AUTO: 5.4 X10(3)/MCL (ref 2.1–9.2)
NEUTROPHILS NFR BLD AUTO: 56.9 %
NRBC BLD AUTO-RTO: 0 %
PLATELET # BLD AUTO: 208 X10(3)/MCL (ref 130–400)
PMV BLD AUTO: 10.9 FL (ref 7.4–10.4)
POTASSIUM SERPL-SCNC: 3.4 MMOL/L (ref 3.5–5.1)
RBC # BLD AUTO: 2.87 X10(6)/MCL (ref 4.2–5.4)
SODIUM SERPL-SCNC: 138 MMOL/L (ref 136–145)
WBC # SPEC AUTO: 9.5 X10(3)/MCL (ref 4.5–11.5)

## 2022-08-27 PROCEDURE — 97530 THERAPEUTIC ACTIVITIES: CPT | Mod: CO

## 2022-08-27 PROCEDURE — 25000003 PHARM REV CODE 250: Performed by: PHYSICIAN ASSISTANT

## 2022-08-27 PROCEDURE — 36415 COLL VENOUS BLD VENIPUNCTURE: CPT | Performed by: NURSE PRACTITIONER

## 2022-08-27 PROCEDURE — 97110 THERAPEUTIC EXERCISES: CPT | Mod: CQ

## 2022-08-27 PROCEDURE — 25000003 PHARM REV CODE 250: Performed by: INTERNAL MEDICINE

## 2022-08-27 PROCEDURE — 25000003 PHARM REV CODE 250: Performed by: NURSE PRACTITIONER

## 2022-08-27 PROCEDURE — 63600175 PHARM REV CODE 636 W HCPCS: Performed by: NURSE PRACTITIONER

## 2022-08-27 PROCEDURE — 85025 COMPLETE CBC W/AUTO DIFF WBC: CPT | Performed by: NURSE PRACTITIONER

## 2022-08-27 PROCEDURE — 80048 BASIC METABOLIC PNL TOTAL CA: CPT | Performed by: NURSE PRACTITIONER

## 2022-08-27 PROCEDURE — 25000003 PHARM REV CODE 250: Performed by: EMERGENCY MEDICINE

## 2022-08-27 PROCEDURE — 11000001 HC ACUTE MED/SURG PRIVATE ROOM

## 2022-08-27 RX ADMIN — SENNOSIDES AND DOCUSATE SODIUM 1 TABLET: 8.6; 5 TABLET ORAL at 08:08

## 2022-08-27 RX ADMIN — POTASSIUM BICARBONATE 25 MEQ: 977.5 TABLET, EFFERVESCENT ORAL at 11:08

## 2022-08-27 RX ADMIN — THERA TABS 1 TABLET: TAB at 08:08

## 2022-08-27 RX ADMIN — ESCITALOPRAM OXALATE 20 MG: 10 TABLET ORAL at 08:08

## 2022-08-27 RX ADMIN — DOCUSATE SODIUM 100 MG: 100 CAPSULE, LIQUID FILLED ORAL at 08:08

## 2022-08-27 RX ADMIN — HYDROCODONE BITARTRATE AND ACETAMINOPHEN 1 TABLET: 5; 325 TABLET ORAL at 11:08

## 2022-08-27 RX ADMIN — Medication 100 MG: at 08:08

## 2022-08-27 RX ADMIN — PANTOPRAZOLE SODIUM 40 MG: 40 TABLET, DELAYED RELEASE ORAL at 08:08

## 2022-08-27 RX ADMIN — ALPRAZOLAM 1 MG: 0.5 TABLET ORAL at 08:08

## 2022-08-27 RX ADMIN — ENOXAPARIN SODIUM 30 MG: 30 INJECTION SUBCUTANEOUS at 08:08

## 2022-08-27 RX ADMIN — FOLIC ACID 1 MG: 1 TABLET ORAL at 08:08

## 2022-08-27 RX ADMIN — POLYETHYLENE GLYCOL 3350 17 G: 17 POWDER, FOR SOLUTION ORAL at 08:08

## 2022-08-27 RX ADMIN — HYDROCODONE BITARTRATE AND ACETAMINOPHEN 1 TABLET: 5; 325 TABLET ORAL at 05:08

## 2022-08-27 NOTE — PLAN OF CARE
Problem: Adult Inpatient Plan of Care  Goal: Patient-Specific Goal (Individualized)  Outcome: Ongoing, Progressing  Flowsheets (Taken 8/26/2022 7863)  Patient-Specific Goals (Include Timeframe): to get to go to rehab soon     Problem: Skin Injury Risk Increased  Goal: Skin Health and Integrity  Outcome: Ongoing, Progressing     Problem: Infection  Goal: Absence of Infection Signs and Symptoms  Outcome: Ongoing, Progressing

## 2022-08-27 NOTE — PROGRESS NOTES
[facility] Hospital Medicine consultation f/u Note        Chief Complaint: Inpatient Follow-up for fall down the stairs with left mid shaft femur fracture     HPI: 71-year-old female with known past medical history of hypertension, anxiety, nicotine dependence, regular alcohol use admitted 08/23 with a diagnosis of comminuted left femoral shaft fracture due to fall at her home of the steps.  Patient reports she fell 5 steps down in the front of her house, patient reports she lives alone and at the time she had to scoot herself to the phone to call the EMS.  Patient denies hitting her head or loss of consciousness.   Patient states she smokes at least 1 pack a day, drinks 3-4 glasses of liquor daily and last consumption was around 7 or 8:00 p.m. last night.  Denies any drug use.  Reported no history of alcohol withdrawals in the past when she would not drink for few days   States she takes amlodipine, hydrochlorothiazide, Lexapro and Xanax at night.  Reported she does not remember the doses of amlodipine, HCTZ.  Stated her PCP increase the dose of Lexapro from 10-20 mg and she thinks that she takes 10 mg of Xanax at night.  I informed patient that Xanax does not come in 10 mg.  Brother at bedside stated he will call home and check the doses informed the patient's nurse in ED   In the ED she went x-ray of the left femur which showed mid shaft fracture.   Patient is admitted to orthopedic service for surgical intervention.  Hospital medicine consulted for medical management.   Orthopedics was consulted patient is status post intramedullary nailing the left femur orthopedics following they recommended weight-bearing as tolerated to left lower extremity to stand and 5 overt for transfers no ambulation okay for full range of motion to left lower extremity  Interval Hx:   Patient seen and examined this morning was about to work with physical therapy reports did not have any bowel movement      Objective/physical exam:  General: In no acute distress, afebrile, laying in bed, mild head tremor noted   Chest: Clear to auscultation bilaterally   Heart: S1, S2, no appreciable murmur   Abdomen: Soft, nontender, BS +   MSK:  Decreased range of motion in the left lower extremity  Neurologic: Alert and oriented x4,     VITAL SIGNS: 24 HRS MIN & MAX LAST   Temp  Min: 98.2 °F (36.8 °C)  Max: 99.6 °F (37.6 °C) 98.6 °F (37 °C)   BP  Min: 109/62  Max: 137/72 133/66   Pulse  Min: 80  Max: 95  81   Resp  Min: 15  Max: 18 18   SpO2  Min: 90 %  Max: 93 % (!) 91 %       Recent Labs   Lab 08/25/22  0345 08/26/22  0410 08/27/22  0451   WBC 14.1* 11.0 9.5   RBC 3.53* 3.00* 2.87*   HGB 11.4* 9.6* 9.3*   HCT 36.3* 31.0* 29.5*   .8* 103.3* 102.8*   MCH 32.3* 32.0* 32.4*   MCHC 31.4* 31.0* 31.5*   RDW 14.2 14.3 14.3    172 208   MPV 10.9* 11.6* 10.9*       Recent Labs   Lab 08/23/22  1308 08/24/22  0354 08/25/22  0345 08/26/22  0410 08/27/22  0451   * 139 139 137 138   K 3.2* 4.0 4.0 3.5 3.4*   CO2 24 30 25 28 29   BUN 9.0* 5.7* 12.6 16.9 13.7   CREATININE 0.72 0.66 0.75 0.71 0.61   CALCIUM 9.5 9.1 8.7 9.0 9.0   ALBUMIN 3.6 2.9*  --   --   --    ALKPHOS 94 75  --   --   --    ALT 38 29  --   --   --    AST 41* 31  --   --   --    BILITOT 0.4 0.6  --   --   --           Microbiology Results (last 7 days)       ** No results found for the last 168 hours. **             See below for Radiology    Scheduled Med:   docusate sodium  100 mg Oral BID    enoxaparin  30 mg Subcutaneous Q12H    EScitalopram oxalate  20 mg Oral Daily    folic acid  1 mg Oral Daily    multivitamin  1 tablet Oral Daily    pantoprazole  40 mg Oral Daily    senna-docusate 8.6-50 mg  1 tablet Oral BID    thiamine  100 mg Oral Daily        Continuous Infusions:       PRN Meds:  acetaminophen, ALPRAZolam, aluminum-magnesium hydroxide-simethicone, bisacodyL, HYDROcodone-acetaminophen, HYDROmorphone, lorazepam, melatonin, methocarbamoL, morphine,  ondansetron, ondansetron, polyethylene glycol, sodium chloride 0.9%       Assessment/Plan:  Comminuted left femoral shaft fracture due to fall at home   status post intramedullary nailing   fall  Hypokalemia/hyponatremia   Constipation  Alcohol abuse - monitor for alcohol withdrawal   Tobacco abuse   History of hypertension    History of anxiety   DNR status        Will start on MiraLax and patient reports she will order prune juice  patient is status post intramedullary nailing done on August 24th tolerated the procedure well   PT and OT following Pain is controlled  On Librium for now, continue with thiamine and folate and multivitamin   Orthopedics following   Blood pressure is stable off the [facility]  Case management consulted for rehab placement  Awaiting placement    VTE prophylaxis:  Lovenox  Patient condition:  Stable    Anticipated discharge and Disposition:    Probable rehab      All diagnosis and differential diagnosis have been reviewed; assessment and plan has been documented; I have personally reviewed the labs and test results that are presently available; I have reviewed the patients medication list; I have reviewed the consulting providers response and recommendations. I have reviewed or attempted to review medical records based upon their availability    All of the patient's questions have been  addressed and answered. Patient's is agreeable to the above stated plan. I will continue to monitor closely and make adjustments to medical management as needed.  _____________________________________________________________________    Nutrition Status:    Radiology:  SURG FL Surgery Fluoro Usage  See OP Notes for results.     IMPRESSION: See OP Notes for results.     This procedure was auto-finalized by: Virtual Radiologist  X-Ray Femur 2 View Left  Narrative: EXAMINATION:  XR FEMUR 2 VIEW LEFT    CPT: 95897    CLINICAL HISTORY:  post-op;    FINDINGS:  There has been interval insertion of an intramedullary  alva traversing a fracture of the midshaft and distal aspect of the femur position and alignment of the visualized osseous structures is anatomical multiple orthopedic screws identified in the distal femur  Impression: Internal fixation    Electronically signed by: Niles Peacock  Date:    08/24/2022  Time:    12:41      Jimena Casper MD   08/27/2022

## 2022-08-27 NOTE — PT/OT/SLP PROGRESS
Physical Therapy         Treatment        Mary Lou Middleton   MRN: [id number]        PT Start Time: 1023     PT Stop Time: 1040    PT Total Time (min): 17 min       Billable Minutes:  Therapeutic Exercise 17  Total Minutes: 17    Treatment Type: Treatment  PT/PTA: PTA     PTA Visit Number: 3       General Precautions: Standard,    Orthopedic Precautions: Orthopedic Precautions : LLE weight bearing as tolerated (t/f only)   Braces: Braces: Knee immobilizer    Spiritual, Cultural Beliefs, Faith Practices, Values that Affect Care: no    Subjective:  Communicated with nurse prior to session.  Pt stated she knows how to mobilize in w/c already thus therex performed  Pain/Comfort  Pain Rating 1: 5/10  Location - Side 1: Left  Location 1: knee  Pain Addressed 1: Reposition, Distraction    Objective:  Patient found sitting UIC upon arrival, with  KI donned on LLE    Functional Mobility:    Additional Treatment:  Semi supine therex, AROM BLE 10reps/2sets,   Ankle pumps, heel slides, Saq, hip abd/add, SLR    Activity Tolerance:  Patient tolerated treatment well    Patient left up in chair with call button in reach.    Assessment:  Mary Lou Middleton is a 71 y.o. female with a medical diagnosis of Displaced spiral fracture of shaft of left femur, initial encounter for closed fracture. Will order RW for t/f use, plan to bring w/c in room for next tx    Rehab potential is good.    Activity tolerance: Good    Discharge recommendations: Discharge Facility/Level of Care Needs: home with home health, rehabilitation facility     Equipment recommendations: Equipment Needed After Discharge: wheelchair     GOALS:   Multidisciplinary Problems       Physical Therapy Goals          Problem: Physical Therapy    Goal Priority Disciplines Outcome Goal Variances Interventions   Physical Therapy Goal     PT, PT/OT Ongoing, Progressing     Description: Goals to be met by: 9/23/2022     Patient will increase functional independence with mobility  by performin. Supine to sit with Modified [place]  2. Sit to supine with Modified [place]  3. Sit to stand transfer with Modified [place]  4. Wheelchair propulsion x500 feet with Modified [place] using bilateral uppper extremities                         PLAN:    Patient to be seen daily  to address the above listed problems via therapeutic exercises  Plan of Care expires: 22  Plan of Care reviewed with: patient         2022

## 2022-08-27 NOTE — PT/OT/SLP PROGRESS
"Occupational Therapy  Treatment    Mary Lou Middleton   MRN: [id number]   Admitting Diagnosis: Displaced spiral fracture of shaft of left femur, initial encounter for closed fracture        OT Start Time: 0825  OT Stop Time: 0850  OT Total Time (min): 25 min      OT/JOSSELYN: JOSSELYN     JOSSELYN Visit Number: 2    General Precautions: Standard, fall  Orthopedic Precautions: LLE non weight bearing (t/fs only)  Braces: Knee immobilizer  Respiratory Status: Room air         Subjective:  Communicated with SHERRY Huffman prior to session.  Pain/Comfort  Pain Rating 1: 3/10  Location - Side 1: Left  Location 1: leg  Pain Addressed 1: Reposition, Distraction    S:"I can get out of bed."    O: pt in sup donned knee immobilizer c dep. Pt sup>EOB c min A for supporting LLE and allowing pt to pull up against forearm to right trunk. Pt with bed elevated completed SPT> c RW and CGA while pivoting on RLE and not putting any weight through LLE. Pt able to control decent to chair c cues. Pt self feed c I while seated in chair.    A:Pt tolerated well and noted c good motivation.     P: cont c current OT POC    AM-PAC 6 CLICK ADL   How much help from another person does this patient currently need?   1 = Unable, Total/Dependent Assistance  2 = A lot, Maximum/Moderate Assistance  3 = A little, Minimum/Contact Guard/Supervision  4 = None, Modified [place]/Independent    Putting on and taking off regular lower body clothing? : 2  Bathing (including washing, rinsing, drying)?: 3  Toileting, which includes using toilet, bedpan, or urinal? : 3  Putting on and taking off regular upper body clothing?: 4  Taking care of personal grooming such as brushing teeth?: 4  Eating meals?: 4  Daily Activity Total Score: 20     AM-PAC Raw Score CMS "G-Code Modifier Level of Impairment Assistance   6 % Total / Unable   7 - 8 CM 80 - 100% Maximal Assist   9-13 CL 60 - 80% Moderate Assist   14 - 19 CK 40 - 60% Moderate Assist   20 - 22 CJ 20 - 40% Minimal Assist "   23 CI 1-20% SBA / CGA   24 CH 0% Independent/ Mod I       Patient left up in chair with all lines intact and call button in reach    ASSESSMENT:  Mary Lou Middleton is a 71 y.o. female with a medical diagnosis of Displaced spiral fracture of shaft of left femur, initial encounter for closed fracture and presents with .    Rehab identified problem list/impairments:      Rehab potential is excellent.    Activity tolerance: Excellent    Discharge recommendations: Discharge Facility/Level of Care Needs: rehabilitation facility, home health PT, home health OT     Barriers to discharge:      Equipment recommendations: walker, rolling     GOALS:   Multidisciplinary Problems       Occupational Therapy Goals          Problem: Occupational Therapy    Goal Priority Disciplines Outcome Interventions   Occupational Therapy Goal    Medium OT, PT/OT Ongoing, Progressing    Description: Patient will perform toileting with min assist using BSC    Patient will perform toilet/BSC transfer with mod assist with use of RW    Patient will perform lower body dressing with min assist while seated EOB, with use of DME                          Plan:  Patient to be seen 5 x/week, 6 x/week to address the above listed problems via therapeutic exercises, therapeutic activities  Plan of Care expires: 09/07/22  Plan of Care reviewed with: patient         08/27/2022

## 2022-08-28 PROCEDURE — 25000003 PHARM REV CODE 250: Performed by: INTERNAL MEDICINE

## 2022-08-28 PROCEDURE — 25000003 PHARM REV CODE 250: Performed by: NURSE PRACTITIONER

## 2022-08-28 PROCEDURE — 27000221 HC OXYGEN, UP TO 24 HOURS

## 2022-08-28 PROCEDURE — 25000003 PHARM REV CODE 250: Performed by: EMERGENCY MEDICINE

## 2022-08-28 PROCEDURE — 11000001 HC ACUTE MED/SURG PRIVATE ROOM

## 2022-08-28 PROCEDURE — 63600175 PHARM REV CODE 636 W HCPCS: Performed by: NURSE PRACTITIONER

## 2022-08-28 PROCEDURE — 25000003 PHARM REV CODE 250: Performed by: PHYSICIAN ASSISTANT

## 2022-08-28 RX ADMIN — DOCUSATE SODIUM 100 MG: 100 CAPSULE, LIQUID FILLED ORAL at 09:08

## 2022-08-28 RX ADMIN — HYDROCODONE BITARTRATE AND ACETAMINOPHEN 1 TABLET: 5; 325 TABLET ORAL at 09:08

## 2022-08-28 RX ADMIN — ESCITALOPRAM OXALATE 20 MG: 10 TABLET ORAL at 09:08

## 2022-08-28 RX ADMIN — THERA TABS 1 TABLET: TAB at 09:08

## 2022-08-28 RX ADMIN — HYDROCODONE BITARTRATE AND ACETAMINOPHEN 1 TABLET: 5; 325 TABLET ORAL at 03:08

## 2022-08-28 RX ADMIN — ENOXAPARIN SODIUM 30 MG: 30 INJECTION SUBCUTANEOUS at 09:08

## 2022-08-28 RX ADMIN — HYDROCODONE BITARTRATE AND ACETAMINOPHEN 1 TABLET: 5; 325 TABLET ORAL at 05:08

## 2022-08-28 RX ADMIN — Medication 100 MG: at 09:08

## 2022-08-28 RX ADMIN — ALPRAZOLAM 1 MG: 0.5 TABLET ORAL at 09:08

## 2022-08-28 RX ADMIN — SENNOSIDES AND DOCUSATE SODIUM 1 TABLET: 8.6; 5 TABLET ORAL at 09:08

## 2022-08-28 RX ADMIN — PANTOPRAZOLE SODIUM 40 MG: 40 TABLET, DELAYED RELEASE ORAL at 09:08

## 2022-08-28 RX ADMIN — FOLIC ACID 1 MG: 1 TABLET ORAL at 09:08

## 2022-08-28 NOTE — PLAN OF CARE
Problem: Adult Inpatient Plan of Care  Goal: Patient-Specific Goal (Individualized)  Outcome: Ongoing, Progressing     Problem: Skin Injury Risk Increased  Goal: Skin Health and Integrity  Outcome: Ongoing, Progressing     Problem: Infection  Goal: Absence of Infection Signs and Symptoms  Outcome: Ongoing, Progressing

## 2022-08-28 NOTE — PROGRESS NOTES
[facility] Medicine consultation f/u Note        Chief Complaint: Inpatient Follow-up for fall down the stairs with left mid shaft femur fracture     HPI: 71-year-old female with known past medical history of hypertension, anxiety, nicotine dependence, regular alcohol use admitted 08/23 with a diagnosis of comminuted left femoral shaft fracture due to fall at her home of the steps.  Patient reports she fell 5 steps down in the front of her house, patient reports she lives alone and at the time she had to scoot herself to the phone to call the EMS.  Patient denies hitting her head or loss of consciousness.   Patient states she smokes at least 1 pack a day, drinks 3-4 glasses of liquor daily and last consumption was around 7 or 8:00 p.m. last night.  Denies any drug use.  Reported no history of alcohol withdrawals in the past when she would not drink for few days   States she takes amlodipine, hydrochlorothiazide, Lexapro and Xanax at night.  Reported she does not remember the doses of amlodipine, HCTZ.  Stated her PCP increase the dose of Lexapro from 10-20 mg and she thinks that she takes 10 mg of Xanax at night.  I informed patient that Xanax does not come in 10 mg.  Brother at bedside stated he will call home and check the doses informed the patient's nurse in ED   In the ED she went x-ray of the left femur which showed mid shaft fracture.   Patient is admitted to orthopedic service for surgical intervention.  Hospital medicine consulted for medical management.   Orthopedics was consulted patient is status post intramedullary nailing the left femur orthopedics following they recommended weight-bearing as tolerated to left lower extremity to stand and 5 overt for transfers no ambulation okay for full range of motion to left lower extremity  Interval Hx:   Patient seen and examined this morning no complaints reports she had bowel movement yesterday   Objective/physical  exam:  General: In no acute distress, afebrile, laying in bed, mild head tremor noted   Chest: Clear to auscultation bilaterally   Heart: S1, S2, no appreciable murmur   Abdomen: Soft, nontender, BS +   MSK:  Decreased range of motion in the left lower extremity  Neurologic: Alert and oriented x4,     VITAL SIGNS: 24 HRS MIN & MAX LAST   Temp  Min: 97.8 °F (36.6 °C)  Max: 98.6 °F (37 °C) 98.1 °F (36.7 °C)   BP  Min: 108/54  Max: 175/72 111/70   Pulse  Min: 72  Max: 80  72   Resp  Min: 16  Max: 18 18   SpO2  Min: 90 %  Max: 95 % (!) 94 %       Recent Labs   Lab 08/25/22  0345 08/26/22  0410 08/27/22  0451   WBC 14.1* 11.0 9.5   RBC 3.53* 3.00* 2.87*   HGB 11.4* 9.6* 9.3*   HCT 36.3* 31.0* 29.5*   .8* 103.3* 102.8*   MCH 32.3* 32.0* 32.4*   MCHC 31.4* 31.0* 31.5*   RDW 14.2 14.3 14.3    172 208   MPV 10.9* 11.6* 10.9*       Recent Labs   Lab 08/23/22  1308 08/24/22  0354 08/25/22  0345 08/26/22  0410 08/27/22  0451   * 139 139 137 138   K 3.2* 4.0 4.0 3.5 3.4*   CO2 24 30 25 28 29   BUN 9.0* 5.7* 12.6 16.9 13.7   CREATININE 0.72 0.66 0.75 0.71 0.61   CALCIUM 9.5 9.1 8.7 9.0 9.0   ALBUMIN 3.6 2.9*  --   --   --    ALKPHOS 94 75  --   --   --    ALT 38 29  --   --   --    AST 41* 31  --   --   --    BILITOT 0.4 0.6  --   --   --           Microbiology Results (last 7 days)       ** No results found for the last 168 hours. **             See below for Radiology    Scheduled Med:   docusate sodium  100 mg Oral BID    enoxaparin  30 mg Subcutaneous Q12H    EScitalopram oxalate  20 mg Oral Daily    folic acid  1 mg Oral Daily    multivitamin  1 tablet Oral Daily    pantoprazole  40 mg Oral Daily    senna-docusate 8.6-50 mg  1 tablet Oral BID    thiamine  100 mg Oral Daily        Continuous Infusions:       PRN Meds:  acetaminophen, ALPRAZolam, aluminum-magnesium hydroxide-simethicone, bisacodyL, HYDROcodone-acetaminophen, HYDROmorphone, lorazepam, melatonin, methocarbamoL, morphine, ondansetron,  ondansetron, polyethylene glycol, sodium chloride 0.9%       Assessment/Plan:  Comminuted left femoral shaft fracture due to fall at home   status post intramedullary nailing   fall  Hypokalemia/hyponatremia   Constipation  Alcohol abuse - monitor for alcohol withdrawal   Tobacco abuse   History of hypertension    History of anxiety   DNR status        Having bowel movements will continue with MiraLax  Case management is working on placement  patient is status post intramedullary nailing done on August 24th tolerated the procedure well   PT and OT following Pain is controlled   continue with thiamine and folate and multivitamin   Orthopedics following   Blood pressure is stable off the [facility]  Case management consulted for rehab placement  Awaiting placement    VTE prophylaxis:  Lovenox  Patient condition:  Stable    Anticipated discharge and Disposition:    Probable rehab      All diagnosis and differential diagnosis have been reviewed; assessment and plan has been documented; I have personally reviewed the labs and test results that are presently available; I have reviewed the patients medication list; I have reviewed the consulting providers response and recommendations. I have reviewed or attempted to review medical records based upon their availability    All of the patient's questions have been  addressed and answered. Patient's is agreeable to the above stated plan. I will continue to monitor closely and make adjustments to medical management as needed.  _____________________________________________________________________    Nutrition Status:    Radiology:  SURG FL Surgery Fluoro Usage  See OP Notes for results.     IMPRESSION: See OP Notes for results.     This procedure was auto-finalized by: Virtual Radiologist  X-Ray Femur 2 View Left  Narrative: EXAMINATION:  XR FEMUR 2 VIEW LEFT    CPT: 40571    CLINICAL HISTORY:  post-op;    FINDINGS:  There has been interval insertion of an intramedullary alva  traversing a fracture of the midshaft and distal aspect of the femur position and alignment of the visualized osseous structures is anatomical multiple orthopedic screws identified in the distal femur  Impression: Internal fixation    Electronically signed by: Niles Peacock  Date:    08/24/2022  Time:    12:41      Jimena Casper MD   08/28/2022

## 2022-08-29 PROCEDURE — 63600175 PHARM REV CODE 636 W HCPCS: Performed by: NURSE PRACTITIONER

## 2022-08-29 PROCEDURE — 97110 THERAPEUTIC EXERCISES: CPT

## 2022-08-29 PROCEDURE — 97530 THERAPEUTIC ACTIVITIES: CPT

## 2022-08-29 PROCEDURE — 25000003 PHARM REV CODE 250: Performed by: NURSE PRACTITIONER

## 2022-08-29 PROCEDURE — 25000003 PHARM REV CODE 250: Performed by: INTERNAL MEDICINE

## 2022-08-29 PROCEDURE — 25000003 PHARM REV CODE 250: Performed by: PHYSICIAN ASSISTANT

## 2022-08-29 PROCEDURE — 11000001 HC ACUTE MED/SURG PRIVATE ROOM

## 2022-08-29 RX ADMIN — ESCITALOPRAM OXALATE 20 MG: 10 TABLET ORAL at 08:08

## 2022-08-29 RX ADMIN — ALPRAZOLAM 1 MG: 0.5 TABLET ORAL at 09:08

## 2022-08-29 RX ADMIN — HYDROCODONE BITARTRATE AND ACETAMINOPHEN 1 TABLET: 5; 325 TABLET ORAL at 05:08

## 2022-08-29 RX ADMIN — HYDROCODONE BITARTRATE AND ACETAMINOPHEN 1 TABLET: 5; 325 TABLET ORAL at 01:08

## 2022-08-29 RX ADMIN — HYDROCODONE BITARTRATE AND ACETAMINOPHEN 1 TABLET: 5; 325 TABLET ORAL at 09:08

## 2022-08-29 RX ADMIN — PANTOPRAZOLE SODIUM 40 MG: 40 TABLET, DELAYED RELEASE ORAL at 08:08

## 2022-08-29 RX ADMIN — HYDROCODONE BITARTRATE AND ACETAMINOPHEN 1 TABLET: 5; 325 TABLET ORAL at 08:08

## 2022-08-29 RX ADMIN — ENOXAPARIN SODIUM 30 MG: 30 INJECTION SUBCUTANEOUS at 09:08

## 2022-08-29 RX ADMIN — THERA TABS 1 TABLET: TAB at 08:08

## 2022-08-29 RX ADMIN — HYDROCODONE BITARTRATE AND ACETAMINOPHEN 1 TABLET: 5; 325 TABLET ORAL at 03:08

## 2022-08-29 RX ADMIN — Medication 100 MG: at 08:08

## 2022-08-29 RX ADMIN — ENOXAPARIN SODIUM 30 MG: 30 INJECTION SUBCUTANEOUS at 08:08

## 2022-08-29 RX ADMIN — FOLIC ACID 1 MG: 1 TABLET ORAL at 08:08

## 2022-08-29 NOTE — PT/OT/SLP PROGRESS
Physical Therapy Treatment    Patient Name:  Mary Lou Sierra   MRN:  [id number]    Recommendations:     Discharge Recommendations:  home with home health, rehabilitation facility (family assist once home)   Discharge Equipment Recommendations: wheelchair, walker, rolling   Barriers to discharge:  acuity of illness    Assessment:     Mary Lou Sierra is a 71 y.o. female admitted with a medical diagnosis of Displaced spiral fracture of shaft of left femur, initial encounter for closed fracture.  She presents with the following impairments/functional limitations:  weakness, impaired endurance, impaired functional mobility, impaired balance, pain, decreased lower extremity function, decreased ROM, orthopedic precautions.    Rehab Prognosis: Good; patient would benefit from acute skilled PT services to address these deficits and reach maximum level of function.    Recent Surgery: Procedure(s) (LRB):  INSERTION, INTRAMEDULLARY TERENCE, FEMUR (Left) 5 Days Post-Op    Plan:     During this hospitalization, patient to be seen daily to address the identified rehab impairments via gait training, therapeutic exercises, therapeutic activities, neuromuscular re-education and progress toward the following goals:    Plan of Care Expires:  09/28/22    Subjective     Chief Complaint: pain  Patient/Family Comments/goals: to be independent  Pain/Comfort:  Pain Rating 1: 6/10  Location - Side 1: Left  Location 1: knee  Pain Addressed 1: Nurse notified      Objective:     Communicated with RN prior to session.  Patient found HOB elevated with peripheral IV upon PT entry to room.     General Precautions: Standard,     Orthopedic Precautions:LLE weight bearing as tolerated (FOR TF only NO AMBULATION)   Braces: N/A  Respiratory Status: Room air     Functional Mobility:  Bed Mobility:     Supine to Sit: minimum assistance  Transfers:     Sit to Stand:  minimum assistance with rolling walker  Wheelchair Propulsion:  Pt propelled Standard  wheelchair x 300 feet on Level tile with  Bilateral lower extremity with Minimal Assistance.  Pt requires Vcs for sequencing of B Ues to propel wheels during turns or navigate obstacles.       AM-PAC 6 CLICK MOBILITY  Turning over in bed (including adjusting bedclothes, sheets and blankets)?: 3  Sitting down on and standing up from a chair with arms (e.g., wheelchair, bedside commode, etc.): 3  Moving from lying on back to sitting on the side of the bed?: 3  Moving to and from a bed to a chair (including a wheelchair)?: 3  Need to walk in hospital room?: 1  Climbing 3-5 steps with a railing?: 1  Basic Mobility Total Score: 14       Therapeutic Exercises:   Pt performed 15 reps of quad set, heel slides, ankle pumps, and glut sets.     Patient left up in chair with all lines intact, call button in reach, and RN notified..    GOALS:   Multidisciplinary Problems       Physical Therapy Goals          Problem: Physical Therapy    Goal Priority Disciplines Outcome Goal Variances Interventions   Physical Therapy Goal     PT, PT/OT Ongoing, Progressing     Description: Goals to be met by: 2022     Patient will increase functional independence with mobility by performin. Supine to sit with Modified [place]  2. Sit to supine with Modified [place]  3. Sit to stand transfer with Modified [place]  4. Wheelchair propulsion x500 feet with Modified [place] using bilateral uppper extremities                         Time Tracking:     PT Received On: 22  PT Start Time: 835     PT Stop Time: 858  PT Total Time (min): 23 min     Billable Minutes: Therapeutic Activity 15 mins and Therapeutic Exercise 8 mins    Treatment Type: Treatment  PT/PTA: PT     PTA Visit Number: 4     2022

## 2022-08-29 NOTE — PROGRESS NOTES
[facility] Neuro  Orthopedics  Progress Note    Patient Name: Mary Lou Sierra  MRN: [id number]  Admission Date: 8/23/2022  Hospital Length of Stay: 6 days  Attending Provider: Silverio Vaughn MD  Primary Care Provider: Rosalinda Torres NP  Follow-up For: Procedure(s) (LRB):  INSERTION, INTRAMEDULLARY TERENCE, FEMUR (Left)    Post-Operative Day: 5 Day Post-Op  Subjective:     Principal Problem:Displaced spiral fracture of shaft of left femur, initial encounter for closed fracture    Principal Orthopedic Problem: same    Interval History: POD 5 IMN L femur. No acute events over the weekend. Pain well controlled with meds. No new complaints/issues today. No redness/drainage from incisions. Continues to work with PT. CM is working on inpatient rehab placement.     Review of patient's allergies indicates:  No Known Allergies    Current Facility-Administered Medications   Medication    acetaminophen tablet 650 mg    ALPRAZolam tablet 1 mg    aluminum-magnesium hydroxide-simethicone 200-200-20 mg/5 mL suspension 30 mL    bisacodyL suppository 10 mg    docusate sodium capsule 100 mg    enoxaparin injection 30 mg    EScitalopram oxalate tablet 20 mg    folic acid tablet 1 mg    HYDROcodone-acetaminophen 5-325 mg per tablet 1 tablet    HYDROmorphone (PF) injection 1 mg    lorazepam injection 1 mg    melatonin tablet 6 mg    methocarbamoL tablet 500 mg    morphine injection 2 mg    multivitamin tablet    ondansetron 4 mg/5 mL solution 4 mg    ondansetron injection 4 mg    pantoprazole EC tablet 40 mg    polyethylene glycol packet 17 g    senna-docusate 8.6-50 mg per tablet 1 tablet    sodium chloride 0.9% flush 10 mL    thiamine tablet 100 mg     Objective:     Vital Signs (Most Recent):  Temp: 98 °F (36.7 °C) (08/29/22 0740)  Pulse: 75 (08/29/22 0740)  Resp: 18 (08/29/22 0852)  BP: (!) 153/67 (08/29/22 0740)  SpO2: 96 % (08/29/22 0740) Vital Signs (24h Range):  Temp:  [98 °F (36.7 °C)-98.3 °F (36.8 °C)]  "98 °F (36.7 °C)  Pulse:  [72-75] 75  Resp:  [16-18] 18  SpO2:  [91 %-96 %] 96 %  BP: (111-153)/(60-78) 153/67     Weight: 72.6 kg (160 lb)  Height: 5' 5" (165.1 cm)  Body mass index is 26.63 kg/m².      Intake/Output Summary (Last 24 hours) at 8/29/2022 0859  Last data filed at 8/29/2022 0332  Gross per 24 hour   Intake 480 ml   Output 500 ml   Net -20 ml         Ortho/SPM Exam   General:  AAOx4. Well nourished, well perfused, no distress.   L LE  Dressings clean, dry, and intact; removed for exam; incisions clean and dry with no erythema or active drainage  She has some swelling at the knee as expected  Thigh with mild swelling but soft and compressible  Tolerates gentle passive ROM of hip and knee  No calf tenderness  5/5 motor strength distally with dorsi/plantar flexion  BCR distally  SLT intact distally    Significant Labs:   Recent Lab Results       None          All pertinent labs within the past 24 hours have been reviewed.    Significant Imaging: X-Ray: I have reviewed all pertinent results/findings and my personal findings are:  post op xray L femur demonstrates good fracture alignment with all hardware intact    Assessment/Plan:     Active Diagnoses:    Diagnosis Date Noted POA    PRINCIPAL PROBLEM:  Displaced spiral fracture of shaft of left femur, initial encounter for closed fracture [S72.342A] 08/24/2022 Yes      Problems Resolved During this Admission:     Pt is doing well s/p IMN L Femur. Continue judicious pain management. Continue PT daily. She can WBAT to L LE to stand/pivot for transfers; no ambulation. Ok for full ROM to L LE. Lovenox for dvt ppx. Continue daily dry dressing changes to incisions. Will need CM eval for placement. Ok for transfer once placed. Follow up with Dr. Vaughn in 2 weeks for a wound check and staple removal.     The above findings, diagnosis, and treatment plan were discussed with Dr. Silverio Vaughn who is in agreement.    "

## 2022-08-29 NOTE — PROGRESS NOTES
[facility] Hospital Medicine consultation f/u Note        Chief Complaint: Inpatient Follow-up for fall down the stairs with left mid shaft femur fracture     HPI: 71-year-old female with known past medical history of hypertension, anxiety, nicotine dependence, regular alcohol use admitted 08/23 with a diagnosis of comminuted left femoral shaft fracture due to fall at her [place].  Patient reports she fell 5 steps down in the front of her house, patient reports she lives alone and at the time she had to scoot herself to the phone to call the EMS.  Patient denies hitting her head or loss of consciousness.   Patient states she smokes at least 1 pack a day, drinks 3-4 glasses of liquor daily and last consumption was around 7 or 8:00 p.m. last night.  Denies any drug use.  Reported no history of alcohol withdrawals in the past when she would not drink for few days   States she takes amlodipine, hydrochlorothiazide, Lexapro and Xanax at night.  Reported she does not remember the doses of amlodipine, HCTZ.  Stated her PCP increase the dose of Lexapro from 10-20 mg and she thinks that she takes 10 mg of Xanax at night.  I informed patient that Xanax does not come in 10 mg.  Brother at bedside stated he will call home and check the doses informed the patient's nurse in ED   In the ED she went x-ray of the left femur which showed mid shaft fracture.   Patient is admitted to orthopedic service for surgical intervention.  Hospital medicine consulted for medical management.   Orthopedics was consulted patient is status post intramedullary nailing the left femur orthopedics following they recommended weight-bearing as tolerated to left lower extremity to stand and 5 overt for transfers no ambulation okay for full range of motion to left lower extremity  Interval Hx:   Patient seen and examined this morning no complaints was about to work with physical therapy       Objective/physical  exam:  General: In no acute distress, afebrile, laying in bed, mild head tremor noted   Chest: Clear to auscultation bilaterally   Heart: S1, S2, no appreciable murmur   Abdomen: Soft, nontender, BS +   MSK:  Decreased range of motion in the left lower extremity  Neurologic: Alert and oriented x4     VITAL SIGNS: 24 HRS MIN & MAX LAST   Temp  Min: 98 °F (36.7 °C)  Max: 98.3 °F (36.8 °C) 98.3 °F (36.8 °C)   BP  Min: 116/70  Max: 153/67 120/77   Pulse  Min: 72  Max: 76  76   Resp  Min: 16  Max: 18 18   SpO2  Min: 91 %  Max: 96 % 95 %       Recent Labs   Lab 08/25/22  0345 08/26/22  0410 08/27/22  0451   WBC 14.1* 11.0 9.5   RBC 3.53* 3.00* 2.87*   HGB 11.4* 9.6* 9.3*   HCT 36.3* 31.0* 29.5*   .8* 103.3* 102.8*   MCH 32.3* 32.0* 32.4*   MCHC 31.4* 31.0* 31.5*   RDW 14.2 14.3 14.3    172 208   MPV 10.9* 11.6* 10.9*       Recent Labs   Lab 08/23/22  1308 08/24/22  0354 08/25/22  0345 08/26/22  0410 08/27/22  0451   * 139 139 137 138   K 3.2* 4.0 4.0 3.5 3.4*   CO2 24 30 25 28 29   BUN 9.0* 5.7* 12.6 16.9 13.7   CREATININE 0.72 0.66 0.75 0.71 0.61   CALCIUM 9.5 9.1 8.7 9.0 9.0   ALBUMIN 3.6 2.9*  --   --   --    ALKPHOS 94 75  --   --   --    ALT 38 29  --   --   --    AST 41* 31  --   --   --    BILITOT 0.4 0.6  --   --   --           Microbiology Results (last 7 days)       ** No results found for the last 168 hours. **             See below for Radiology    Scheduled Med:   docusate sodium  100 mg Oral BID    enoxaparin  30 mg Subcutaneous Q12H    EScitalopram oxalate  20 mg Oral Daily    folic acid  1 mg Oral Daily    multivitamin  1 tablet Oral Daily    pantoprazole  40 mg Oral Daily    senna-docusate 8.6-50 mg  1 tablet Oral BID    thiamine  100 mg Oral Daily        Continuous Infusions:       PRN Meds:  acetaminophen, ALPRAZolam, aluminum-magnesium hydroxide-simethicone, bisacodyL, HYDROcodone-acetaminophen, HYDROmorphone, lorazepam, melatonin, methocarbamoL, morphine, ondansetron, ondansetron,  polyethylene glycol, sodium chloride 0.9%       Assessment/Plan:  Comminuted left femoral shaft fracture due to fall at home   status post intramedullary nailing   fall  Hypokalemia/hyponatremia   Constipation  Alcohol abuse - monitor for alcohol withdrawal   Tobacco abuse   History of hypertension    History of anxiety   DNR status        Case management is working on placement  patient is status post intramedullary nailing done on August 24th tolerated the procedure well   PT and OT following Pain is controlled   continue with thiamine and folate and multivitamin   Orthopedics following   Blood pressure is stable off the [facility]  Case management consulted for rehab placement  Awaiting placement    VTE prophylaxis:  Lovenox  Patient condition:  Stable    Anticipated discharge and Disposition:    Probable rehab      All diagnosis and differential diagnosis have been reviewed; assessment and plan has been documented; I have personally reviewed the labs and test results that are presently available; I have reviewed the patients medication list; I have reviewed the consulting providers response and recommendations. I have reviewed or attempted to review medical records based upon their availability    All of the patient's questions have been  addressed and answered. Patient's is agreeable to the above stated plan. I will continue to monitor closely and make adjustments to medical management as needed.  _____________________________________________________________________    Nutrition Status:    Radiology:  SURG FL Surgery Fluoro Usage  See OP Notes for results.     IMPRESSION: See OP Notes for results.     This procedure was auto-finalized by: Virtual Radiologist  X-Ray Femur 2 View Left  Narrative: EXAMINATION:  XR FEMUR 2 VIEW LEFT    CPT: 81537    CLINICAL HISTORY:  post-op;    FINDINGS:  There has been interval insertion of an intramedullary alva traversing a fracture of the midshaft and distal aspect of the femur  position and alignment of the visualized osseous structures is anatomical multiple orthopedic screws identified in the distal femur  Impression: Internal fixation    Electronically signed by: Niles Peacock  Date:    08/24/2022  Time:    12:41      Jimena Casper MD   08/29/2022

## 2022-08-29 NOTE — PROGRESS NOTES
"Inpatient Nutrition Evaluation    Admit Date: 8/23/2022   Nutrition Recommendation/Prescription     Continue regular diet as tolerated  RD to order chocolate boost once/day to provide an additional 240 kcal and 10 g protein per container  RD to monitor po intake and weight changes    Nutrition Assessment     Chart Review    Reason Seen: length of stay    Diagnosis: Comminuted left femoral shaft fracture d/t fall at home s/p intramedullary nailing   Fall  Hypokalemia/hyponatremia   Constipation  DNR status      Relevant Medical History: HTN, anxiety, tobacco/alcohol abuse    Nutrition-Related Medications: suppository PRN, colace BID, folic acid daily, MVI daily, Zofran PRN, Protonix daily, Miralax PRN, senokot BID, NaCl PRN, thiamine daily    Nutrition-Related Labs: 8/27: RBC-2.87, H/H-9.3/29.5, K-3.4      Diet Order: Diet Adult Regular  Oral Supplement Order: none at this time  Appetite/Oral Intake: fair/50-75% of meals  Factors Affecting Nutritional Intake: none identified at this time  Food/[employer]/Cultural Preferences: none reported    Skin Integrity: incision  Wound(s):   n/a    Comments    8/29: pt reports fair appetite, eating 50% of meals since admission, with no n/v/d/c, stating constipation resolved with meds. Agreed to ONS at this time. UBW reported 153 lb with no recent weight loss. Admit weight of 72.6 klg (160 lb) on 8/23 and no previous weights available.     Anthropometrics    Height: 5' 5" (165.1 cm)    Last Weight: 72.6 kg (160 lb) (08/23/22 1801) Weight Method: Bed Scale  BMI (Calculated): 26.6  BMI Classification: overweight (BMI 25-29.9)  Ideal Body Weight (IBW), Female: 125 lb  % Ideal Body Weight, Female (lb): 128 %                         Wt Readings from Last 5 Encounters:   08/23/22 72.6 kg (160 lb)     Weight Change(s) Since Admission: 0%  Admit Weight: 72.6 kg (160 lb) (08/23/22 1801)    Patient Education    Not applicable.    Monitoring & Evaluation     Dietitian will monitor food and " beverage intake and weight change.  Nutrition Risk/Follow-Up: low (follow-up in 5-7 days)  Patients assigned 'low nutrition risk' status do not qualify for a full nutritional assessment but will be monitored and re-evaluated in a 5-7 day time period.    aHwa Raymond, Registration Eligible, Provisional LDN

## 2022-08-30 LAB
ANION GAP SERPL CALC-SCNC: 4 MEQ/L
BASOPHILS # BLD AUTO: 0.03 X10(3)/MCL (ref 0–0.2)
BASOPHILS NFR BLD AUTO: 0.4 %
BUN SERPL-MCNC: 13.3 MG/DL (ref 9.8–20.1)
CALCIUM SERPL-MCNC: 8.8 MG/DL (ref 8.4–10.2)
CHLORIDE SERPL-SCNC: 102 MMOL/L (ref 98–107)
CO2 SERPL-SCNC: 34 MMOL/L (ref 23–31)
CREAT SERPL-MCNC: 0.63 MG/DL (ref 0.55–1.02)
CREAT/UREA NIT SERPL: 21
EOSINOPHIL # BLD AUTO: 0.65 X10(3)/MCL (ref 0–0.9)
EOSINOPHIL NFR BLD AUTO: 8.6 %
ERYTHROCYTE [DISTWIDTH] IN BLOOD BY AUTOMATED COUNT: 14.3 % (ref 11.5–17)
GFR SERPLBLD CREATININE-BSD FMLA CKD-EPI: >60 MLS/MIN/1.73/M2
GLUCOSE SERPL-MCNC: 100 MG/DL (ref 82–115)
HCT VFR BLD AUTO: 27.9 % (ref 37–47)
HGB BLD-MCNC: 8.8 GM/DL (ref 12–16)
IMM GRANULOCYTES # BLD AUTO: 0.07 X10(3)/MCL (ref 0–0.04)
IMM GRANULOCYTES NFR BLD AUTO: 0.9 %
LYMPHOCYTES # BLD AUTO: 2.83 X10(3)/MCL (ref 0.6–4.6)
LYMPHOCYTES NFR BLD AUTO: 37.4 %
MCH RBC QN AUTO: 32.2 PG (ref 27–31)
MCHC RBC AUTO-ENTMCNC: 31.5 MG/DL (ref 33–36)
MCV RBC AUTO: 102.2 FL (ref 80–94)
MONOCYTES # BLD AUTO: 0.65 X10(3)/MCL (ref 0.1–1.3)
MONOCYTES NFR BLD AUTO: 8.6 %
NEUTROPHILS # BLD AUTO: 3.3 X10(3)/MCL (ref 2.1–9.2)
NEUTROPHILS NFR BLD AUTO: 44.1 %
NRBC BLD AUTO-RTO: 0 %
PLATELET # BLD AUTO: 301 X10(3)/MCL (ref 130–400)
PMV BLD AUTO: 10.7 FL (ref 7.4–10.4)
POTASSIUM SERPL-SCNC: 3.8 MMOL/L (ref 3.5–5.1)
RBC # BLD AUTO: 2.73 X10(6)/MCL (ref 4.2–5.4)
SODIUM SERPL-SCNC: 140 MMOL/L (ref 136–145)
WBC # SPEC AUTO: 7.6 X10(3)/MCL (ref 4.5–11.5)

## 2022-08-30 PROCEDURE — 63600175 PHARM REV CODE 636 W HCPCS: Performed by: NURSE PRACTITIONER

## 2022-08-30 PROCEDURE — 25000003 PHARM REV CODE 250: Performed by: NURSE PRACTITIONER

## 2022-08-30 PROCEDURE — 85025 COMPLETE CBC W/AUTO DIFF WBC: CPT | Performed by: INTERNAL MEDICINE

## 2022-08-30 PROCEDURE — 97535 SELF CARE MNGMENT TRAINING: CPT

## 2022-08-30 PROCEDURE — 27000221 HC OXYGEN, UP TO 24 HOURS

## 2022-08-30 PROCEDURE — 80048 BASIC METABOLIC PNL TOTAL CA: CPT | Performed by: INTERNAL MEDICINE

## 2022-08-30 PROCEDURE — 25000003 PHARM REV CODE 250: Performed by: EMERGENCY MEDICINE

## 2022-08-30 PROCEDURE — 25000003 PHARM REV CODE 250: Performed by: PHYSICIAN ASSISTANT

## 2022-08-30 PROCEDURE — 25000003 PHARM REV CODE 250: Performed by: INTERNAL MEDICINE

## 2022-08-30 PROCEDURE — 97530 THERAPEUTIC ACTIVITIES: CPT

## 2022-08-30 PROCEDURE — 36415 COLL VENOUS BLD VENIPUNCTURE: CPT | Performed by: INTERNAL MEDICINE

## 2022-08-30 PROCEDURE — 11000001 HC ACUTE MED/SURG PRIVATE ROOM

## 2022-08-30 RX ADMIN — HYDROCODONE BITARTRATE AND ACETAMINOPHEN 1 TABLET: 5; 325 TABLET ORAL at 09:08

## 2022-08-30 RX ADMIN — HYDROCODONE BITARTRATE AND ACETAMINOPHEN 1 TABLET: 5; 325 TABLET ORAL at 02:08

## 2022-08-30 RX ADMIN — ENOXAPARIN SODIUM 30 MG: 30 INJECTION SUBCUTANEOUS at 09:08

## 2022-08-30 RX ADMIN — HYDROCODONE BITARTRATE AND ACETAMINOPHEN 1 TABLET: 5; 325 TABLET ORAL at 07:08

## 2022-08-30 RX ADMIN — THERA TABS 1 TABLET: TAB at 09:08

## 2022-08-30 RX ADMIN — SENNOSIDES AND DOCUSATE SODIUM 1 TABLET: 8.6; 5 TABLET ORAL at 09:08

## 2022-08-30 RX ADMIN — FOLIC ACID 1 MG: 1 TABLET ORAL at 09:08

## 2022-08-30 RX ADMIN — Medication 100 MG: at 09:08

## 2022-08-30 RX ADMIN — METHOCARBAMOL 500 MG: 500 TABLET ORAL at 09:08

## 2022-08-30 RX ADMIN — DOCUSATE SODIUM 100 MG: 100 CAPSULE, LIQUID FILLED ORAL at 09:08

## 2022-08-30 RX ADMIN — METHOCARBAMOL 500 MG: 500 TABLET ORAL at 02:08

## 2022-08-30 RX ADMIN — ESCITALOPRAM OXALATE 20 MG: 10 TABLET ORAL at 09:08

## 2022-08-30 RX ADMIN — PANTOPRAZOLE SODIUM 40 MG: 40 TABLET, DELAYED RELEASE ORAL at 09:08

## 2022-08-30 RX ADMIN — ALPRAZOLAM 1 MG: 0.5 TABLET ORAL at 09:08

## 2022-08-30 NOTE — PROGRESS NOTES
[facility] Medicine consultation f/u Note        Chief Complaint: Inpatient Follow-up for fall down the stairs with left mid shaft femur fracture     HPI: 71-year-old female with known past medical history of hypertension, anxiety, nicotine dependence, regular alcohol use admitted 08/23 with a diagnosis of comminuted left femoral shaft fracture due to fall at her home of the steps.  Patient reports she fell 5 steps down in the front of her house, patient reports she lives alone and at the time she had to scoot herself to the phone to call the EMS.  Patient denies hitting her head or loss of consciousness.   Patient states she smokes at least 1 pack a day, drinks 3-4 glasses of liquor daily and last consumption was around 7 or 8:00 p.m. last night.  Denies any drug use.  Reported no history of alcohol withdrawals in the past when she would not drink for few days   States she takes amlodipine, hydrochlorothiazide, Lexapro and Xanax at night.  Reported she does not remember the doses of amlodipine, HCTZ.  Stated her PCP increase the dose of Lexapro from 10-20 mg and she thinks that she takes 10 mg of Xanax at night.  I informed patient that Xanax does not come in 10 mg.  Brother at bedside stated he will call home and check the doses informed the patient's nurse in ED   In the ED she went x-ray of the left femur which showed mid shaft fracture.   Patient is admitted to orthopedic service for surgical intervention.  Hospital medicine consulted for medical management.   Orthopedics was consulted patient is status post intramedullary nailing the left femur orthopedics following they recommended weight-bearing as tolerated to left lower extremity to stand and 5 overt for transfers no ambulation okay for full range of motion to left lower extremity  Interval Hx:   Patient seen and examined this morning was asking for pain medication and was also complaining of some spasm in the left  leg     Objective/physical exam:  General: In no acute distress, afebrile, laying in bed, mild head tremor noted   Chest: Clear to auscultation bilaterally   Heart: S1, S2, no appreciable murmur   Abdomen: Soft, nontender, BS +   MSK:  Decreased range of motion in the left lower extremity  Neurologic: Alert and oriented x4     VITAL SIGNS: 24 HRS MIN & MAX LAST   Temp  Min: 98.2 °F (36.8 °C)  Max: 98.8 °F (37.1 °C) 98.7 °F (37.1 °C)   BP  Min: 102/65  Max: 164/78 102/65   Pulse  Min: 69  Max: 76  76   Resp  Min: 16  Max: 20 18   SpO2  Min: 92 %  Max: 96 % (!) 93 %       Recent Labs   Lab 08/26/22  0410 08/27/22  0451 08/30/22  0426   WBC 11.0 9.5 7.6   RBC 3.00* 2.87* 2.73*   HGB 9.6* 9.3* 8.8*   HCT 31.0* 29.5* 27.9*   .3* 102.8* 102.2*   MCH 32.0* 32.4* 32.2*   MCHC 31.0* 31.5* 31.5*   RDW 14.3 14.3 14.3    208 301   MPV 11.6* 10.9* 10.7*       Recent Labs   Lab 08/23/22  1308 08/24/22  0354 08/25/22  0345 08/26/22  0410 08/27/22  0451 08/30/22  0426   * 139   < > 137 138 140   K 3.2* 4.0   < > 3.5 3.4* 3.8   CO2 24 30   < > 28 29 34*   BUN 9.0* 5.7*   < > 16.9 13.7 13.3   CREATININE 0.72 0.66   < > 0.71 0.61 0.63   CALCIUM 9.5 9.1   < > 9.0 9.0 8.8   ALBUMIN 3.6 2.9*  --   --   --   --    ALKPHOS 94 75  --   --   --   --    ALT 38 29  --   --   --   --    AST 41* 31  --   --   --   --    BILITOT 0.4 0.6  --   --   --   --     < > = values in this interval not displayed.          Microbiology Results (last 7 days)       ** No results found for the last 168 hours. **             See below for Radiology    Scheduled Med:   docusate sodium  100 mg Oral BID    enoxaparin  30 mg Subcutaneous Q12H    EScitalopram oxalate  20 mg Oral Daily    folic acid  1 mg Oral Daily    multivitamin  1 tablet Oral Daily    pantoprazole  40 mg Oral Daily    senna-docusate 8.6-50 mg  1 tablet Oral BID    thiamine  100 mg Oral Daily        Continuous Infusions:       PRN Meds:  acetaminophen, ALPRAZolam,  aluminum-magnesium hydroxide-simethicone, bisacodyL, HYDROcodone-acetaminophen, HYDROmorphone, lorazepam, melatonin, methocarbamoL, morphine, ondansetron, ondansetron, polyethylene glycol, sodium chloride 0.9%       Assessment/Plan:  Comminuted left femoral shaft fracture due to fall at home   status post intramedullary nailing   fall  Hypokalemia/hyponatremia   Constipation  Alcohol abuse - monitor for alcohol withdrawal   Tobacco abuse   History of hypertension    History of anxiety   DNR status        Methocarbamol as needed for muscle spasm  Case management is working on placement  patient is status post intramedullary nailing done on August 24th tolerated the procedure well   PT and OT following Pain is controlled   continue with thiamine and folate and multivitamin   Orthopedics following   Blood pressure is stable off the [facility]  Case management consulted for rehab placement  Awaiting placement    VTE prophylaxis:  Lovenox  Patient condition:  Stable    Anticipated discharge and Disposition:    Probable rehab      All diagnosis and differential diagnosis have been reviewed; assessment and plan has been documented; I have personally reviewed the labs and test results that are presently available; I have reviewed the patients medication list; I have reviewed the consulting providers response and recommendations. I have reviewed or attempted to review medical records based upon their availability    All of the patient's questions have been  addressed and answered. Patient's is agreeable to the above stated plan. I will continue to monitor closely and make adjustments to medical management as needed.  _____________________________________________________________________    Nutrition Status:    Radiology:  SURG FL Surgery Fluoro Usage  See OP Notes for results.     IMPRESSION: See OP Notes for results.     This procedure was auto-finalized by: Virtual Radiologist  X-Ray Femur 2 View Left  Narrative:  EXAMINATION:  XR FEMUR 2 VIEW LEFT    CPT: 27560    CLINICAL HISTORY:  post-op;    FINDINGS:  There has been interval insertion of an intramedullary alva traversing a fracture of the midshaft and distal aspect of the femur position and alignment of the visualized osseous structures is anatomical multiple orthopedic screws identified in the distal femur  Impression: Internal fixation    Electronically signed by: Niles Peacock  Date:    08/24/2022  Time:    12:41      Jimena Casper MD   08/30/2022

## 2022-08-30 NOTE — CONSULTS
[facility] Neuro  Inpatient Rehab Prescreen    PATIENT INFORMATION     Assessment date/time: 08/30/2022 2:12 PM    Name: Mary Lou Sierra Phone: [phone]   Address:   [facility] [place] [address] SSN:    YOB: 1950 Age: 71 y.o. Gender: female   Race: White   Marital Status:    Advance Directives: Full code     COVERAGE INFORMATION     Patient Medicare #:      Primary Insurance Type: Blue Cross Blue Shield/[employer] [facility] Secondary Insurance Type:  / N/A   Policy #: [id number] Policy #:     Insurance contact name/number:  Insurance contact name/number:    Authorization #:  Authorization #:    Verified Coverage: Insurance Carrier   Pending Coverage:    Prescription Coverage:  Insurance details/comments:      PHYSICIAN/REFERRAL INFORMATION     Primary Care Physician: Rosalinda Torres NP Attending Physician: Silverio Vaughn MD   Consulting physician/specialist:  Referring Physician:    Referring facility: [facility] Referring contact name/phone:    Physician details/comments:      CONTACT INFORMATION   Extended Emergency Contact Information  Primary Emergency Contact: Lesly Sierra  Mobile Phone: [phone]  Relation: Sister  Preferred language: English   needed? No    PRIOR LIVING SITUATION         Bedroom location/setup: 1st floor   Bathroom location/setup: 1st floor   Equipment at home:    Prior device use:       PRIOR LEVEL OF FUNCTION     Did a helper need to assist with the following activities prior to the current illness, exacerbation, or injury?   Self-care:  No, independent   Indoor mobility:  No, independent   Stairs: No, independent   Functional Cognition: No, independent   Comments:    Caregivers providing assistance:   Pre-hospital home care service:  Name of Agency:    Home/personal responsibilities:    Pre-hospital vocational category: Retired for age   Pre-hospital vocational effort: Retired for age    Occupation/profession:  Return to work/school plan:    Educational history:    Hobbies/leisure activities:  Resources used prior to admission:    Available resources:  Resource information comments:      REHABILITATION DIAGNOSIS     History of present illness:   71-year-old female with known past medical history of hypertension, anxiety, nicotine dependence, regular alcohol use admitted 08/23 with a diagnosis of comminuted left femoral shaft fracture due to fall at her home of the [facility].  Patient reports she fell 5 steps down in the front of her house, patient reports she lives alone and at the time she had to scoot herself to the phone to call the EMS.  Patient denies hitting her head or loss of consciousness.   Patient states she smokes at least 1 pack a day, drinks 3-4 glasses of liquor daily and last consumption was around 7 or 8:00 p.m. last night.  Denies any drug use.  Reported no history of alcohol withdrawals in the past when she would not drink for few days   States she takes amlodipine, hydrochlorothiazide, Lexapro and Xanax at night.  Reported she does not remember the doses of amlodipine, HCTZ.  Stated her PCP increase the dose of Lexapro from 10-20 mg and she thinks that she takes 10 mg of Xanax at night.  I informed patient that Xanax does not come in 10 mg.  Brother at bedside stated he will call home and check the doses informed the patient's nurse in ED   In the ED she went x-ray of the left femur which showed mid shaft fracture.   Patient was admitted to orthopedic service for surgical intervention.  Hospital medicine consulted for medical management.   Orthopedics was consulted patient is status post intramedullary nailing the left femur orthopedics following they recommended weight-bearing as tolerated to left lower extremity to stand and  for transfers no ambulation okay for full range of motion to left lower extremity. Pt. Is participating with therapy. She will benefit from an inpatient  rehab stay. This will help to increase patient's functional independence with ADLs and mobility prior to return home.  Pt. Requires acute inpatient rehab admission with 24-hour nursing and active physician oversight to monitor and manage acute medical comorbid conditions, labs, pain, and functional deficits.  Patient/family will also require teaching and integration of improving functional skills into daily living.  She will also require individualized, interdisciplinary approach to his care, receiving PT,OT services 3 hours per day, 5 days per week.    Required care cannot be provided at lower level of care. Patient is anticipated to require approximately 10-12 days LOS with expected discharge dorothy with HH.   Impairment group (IGC):   Femur (Shaft) Fracture 08.2 Etiologic diagnosis/description:   Left spiral femur shaft fracture   Date of onset:  8/23/2022 Date of surgery: IM nailing 8/24/2022   Allergies: Patient has no known allergies.   Comorbid condition: Anxiety and Hypertension   Medical/functional conditions requiring inpatient rehabilitation:   This patient requires medical management/24-hour nursing?of complex      comorbidities ( HTN, anxiety, nicotene dependence, regular alcohol use), labs,medications (see medications list), pain, incision care, sleep  hygiene, anticoagulation, nutrition, hydration, neurological,  pulmonary, and cardiac status, and preventive healthcare.          This patient requires intense therapy and an integrated,    interdisciplinary approach to address safety, impaired mobility,    impaired ADLs (dressing, toileting, grooming, showering),   pulmonary insufficiency,  preventive healthcare, medication management, integration of  improving functional skills into daily living, and home caregiver    support and training.    Risk for medical/clinical complications:   This patient is at risk for the following complications: DVT/PE, pneumonia,    malnutrition, neurological decline,  "respiratory insufficiency,    worsening activity intolerance, complications from anticoagulation,    Incision infection, skin breakdown, inadequate  sleep, and constipation.           SPECIAL REHABILITATION NEEDS     Requires O2: 2 liters Preferred Language: English           PRECAUTIONS     Orthotic device type: knee immobilizer, Wearning schedule: with therapy, Safety/fall precautions: Recent history of falls and High fall risk, and Weight-bearing status: Weight-bearing as tolerated: for transfers only, no ambulation     PAST MEDICAL, SOCIAL, FAMILY HISTORY     Pertinent past medical history:   Past Medical History:   Diagnosis Date    Hypertension       Has the patient had two or more falls in the past year or any fall with injury in the past year: YES   Has the patient had major surgery during the 100 days prior to admission: YES   Family Medical History: History reviewed. No pertinent family history.   Substance use history:   Social History     Substance and Sexual Activity   Alcohol Use None     Social History     Tobacco Use   Smoking Status Not on file   Smokeless Tobacco Not on file     Social History     Substance and Sexual Activity   Drug Use Not on file      VITALS   BP:  102/65     Temp: 98.7 °F (37.1 °C)     HR: 76     Resp: 15     SpO2: (!) 93 %     Height: 5' 5" (1.651 m)     Weight: 72.6 kg (160 lb)     BMI: 26.6          MED/LABS/DIAGNOSITICS   Current Facility-Administered Medications   Medication Dose Route Frequency Provider Last Rate Last Admin    acetaminophen tablet 650 mg  650 mg Oral Q6H PRN ALISHA Medrano        ALPRAZolam tablet 1 mg  1 mg Oral Nightly PRN Usman Urbina MD   1 mg at 08/29/22 2146    aluminum-magnesium hydroxide-simethicone 200-200-20 mg/5 mL suspension 30 mL  30 mL Oral Q6H PRN ALISHA Medrano        bisacodyL suppository 10 mg  10 mg Rectal Daily PRN ALISHA Medrano        docusate sodium capsule 100 mg  100 mg Oral BID ALISHA Medrano   100 " mg at 08/30/22 0929    enoxaparin injection 30 mg  30 mg Subcutaneous Q12H ALISHA Medrano   30 mg at 08/30/22 0928    EScitalopram oxalate tablet 20 mg  20 mg Oral Daily Usman Urbina MD   20 mg at 08/30/22 0929    folic acid tablet 1 mg  1 mg Oral Daily SHAYNE Hill   1 mg at 08/30/22 0930    HYDROcodone-acetaminophen 5-325 mg per tablet 1 tablet  1 tablet Oral Q4H PRN ALISHA Medrano   1 tablet at 08/30/22 1407    HYDROmorphone (PF) injection 1 mg  1 mg Intravenous Q4H PRN Leonidas Hadley III, MD        lorazepam injection 1 mg  1 mg Intravenous PRN SHAYNE Hill        melatonin tablet 6 mg  6 mg Oral Nightly PRN Leonidas Hadley III, MD        methocarbamoL tablet 500 mg  500 mg Oral TID PRN ALISHA Medrano   500 mg at 08/30/22 1407    morphine injection 2 mg  2 mg Intravenous Q4H PRN ALISHA Medrano        multivitamin tablet  1 tablet Oral Daily SHAYNE Hill   1 tablet at 08/30/22 0929    ondansetron 4 mg/5 mL solution 4 mg  4 mg Oral Q6H PRN ALISHA Medrano        ondansetron injection 4 mg  4 mg Intravenous Q8H PRN Leonidas Hadley III, MD        pantoprazole EC tablet 40 mg  40 mg Oral Daily ALISHA Medrano   40 mg at 08/30/22 0930    polyethylene glycol packet 17 g  17 g Oral Daily PRN ALISHA Medrano   17 g at 08/27/22 2008    senna-docusate 8.6-50 mg per tablet 1 tablet  1 tablet Oral BID Leonidas Hadley III, MD   1 tablet at 08/30/22 0929    sodium chloride 0.9% flush 10 mL  10 mL Intravenous PRN Leonidas Hadley III, MD        thiamine tablet 100 mg  100 mg Oral Daily SHAYNE Hill   100 mg at 08/30/22 0929      Pertinent lab results:   Lab Results   Component Value Date    WBC 7.6 08/30/2022    HGB 8.8 (L) 08/30/2022    HCT 27.9 (L) 08/30/2022     08/30/2022     08/30/2022    K 3.8 08/30/2022    BUN 13.3 08/30/2022    CO2 34 (H) 08/30/2022      Pertinent diagnostic studies:    Additional labs and diagnostic  studies required prior to admission:      QI: GG Care Tool     QI: GG Care Tool  Therapy Evaluation   Swallowing/  Nutritional Status   Diet/Feeding/  Swallowing    Eating       Oral Hygiene   Grooming    Toileting Hygiene       Shower/Bathe   Bathing    Upper Body Dressing   Dressing Upper    Lower Body Dressing   Dressing Lower Min assist to don/doff socks   Putting On/Taking Off Footwear       Toilet Transfer   Toileting    Bladder Continence Status   Bladder     Bowel Continence Status   Bowel     Roll Left and Right   Bed Mobility Sit to supine mod assist  Scooting standby assist   Sit to Lying       Lying to Sitting on Side of Bed       Sit to Stand       Chair/Bed-to- Chair   Transfers Sit to stand min assist with RW  Toilet transfer min assist   Car Transfer       Walk 10 Feet   Equipment RW     Walk 50 Feet with Two Turns   Balance    Walk 150 Feet   Endurance    Walk 10 Feet on Uneven Surfaces   Gait N/A   Picking up an Object       Wheel 50 Feet with Two Turns   Wheelchair Min assist for 300 ft.   Wheel 150 Feet       1 Step (Curb)   Stairs    4 Steps       12 Steps       Expression of Ideas and Wants   Communication    Understanding  Verbal and Non-Verbal Content       Cognitive Patterns   Cognition       Safety Precautions       Lower Extremity      Strength       ROM       Upper Extremity      Strength       ROM      Care Score Value Definitions  6: Independent. [place] provides no assistance with tasks. A device may or may not have been used.  5: Set-up or clean-up assistance. [place] sets up or cleans up, but does not assist with tasks. [place] may have assisted prior to or following the activity.  4: Supervision or touching assistance. [place] provides verbal cues or touching/steadying or contact guard assistance. Assistance may be provided throughout the activity or intermittently.  3: Partial/moderate assistance. [place] does less than half the effort. [place] lifts, holds, or supports trunk or limbs,  but provides less than half the effort.  2: Substantial/maximal assistance. [place] does more than half the effort. [place] lifts or holds trunk or limbs, and provides more than half the effort.  1: Dependent. [place] does all of the effort, or the assistance of two or more helpers is required for the patient to complete the activity.  Care Score Activity Not Attempted Value Definitions  7: Patient refused.  9: Not applicable. Not attempted and the patient did not perform this activity prior to the current illness, exacerbation, or injury.  10: Not attempted due to environmental limitations (e.g., lack of equipment, weather constraints).  88: Not attempted due to medical condition or safety concerns.    DISCHARGE GOALS/ANTICPATED INTERVENTIONS/SERVICES     Expected Level of Improvement for Safe Discharge: Anticipate discharge home at or near baseline level of functioning and/or decreased burden of care.               Patient/Family/Caregiver Goals:    Required Treatments/Services: Rehabilitation Nursing, Dietitian/nutrition, Social , and Case Management   Required Therapy Therapy Type Min/Day Days/Week Duration of Therapy   Physical Therapy 90 5 10-12 days   Occupational Therapy 90 5 10-12 days   Speech and Language Pathology      Prosthetic/Orthotics      Recreational Therapy      Anticipated Services upon Discharge:  Home health vs outpatient therapy PT/OT   Additional rehabilitation needs:  N/A   Expected Discharge Destination:  Home with    Barriers to Discharge: Lives alone   Discharge Support: Patient lives alone and does not have a caregiver available   Patient/Family/Caregiver Orientation: Patient/family/caregiver oriented and agreeable to inpatient rehabilitation plan   Estimated Length of Stay: 10-12 days   Projected Admission Date: 08/31/2022   Medical Prognosis: good   Physicians Review and Admission Determination:

## 2022-08-30 NOTE — PT/OT/SLP PROGRESS
Occupational Therapy  Treatment    Mary Lou Sierra   MRN: [id number]   Admitting Diagnosis: Displaced spiral fracture of shaft of left femur, initial encounter for closed fracture s/p IMN    OT Date of Treatment: 08/30/22   OT Start Time: 0959  OT Stop Time: 1024  OT Total Time (min): 25 min     Billable Minutes:  Self Care/Home Management 2 units   Total Minutes: 25 minutes      OT/JOSSELYN: OT     JOSSELYN Visit Number: 3    General Precautions: Standard, fall  Orthopedic Precautions: LLE weight bearing as tolerated (WBAT LLE stand and t/f only- no ambulation)  Braces: N/A    Spiritual, Cultural Beliefs, Latter-day Practices, Values that Affect Care: no    Subjective:  Communicated with RN prior to session.    Pain/Comfort  Pain Rating 1: 5/10  Location - Side 1: Left  Location 1: leg  Pain Addressed 1: Reposition    Objective:  Patient found with: [place]    Functional Mobility:  Bed Mobility:   Supine to sit: Minimal Assistance   Sit to supine: Activity did not occur   Rolling: Activity did not occur   Scooting: Standby Assistance    Transfer Training:   Sit to stand:Minimal Assistance with Rolling Walker Min A sit<>stand from EOB  Bed <> Chair:  Stand Pivot with Minimal Assistance with Rolling Walker Min A stand pivot to w/c using RW  Toilet Transfer:  Pt Stand Pivot with Minimal Assistance with Rolling Walker Pt performed w/c mobility to bathroom c SBA. Performed stand pivot t/f to toilet using RW c Min A      LE Dressing:  Patient don/doffed socks with Minimal Assistance  Pt donned R sock INDLY. Required Min A to use sock aide to don sock on L foot     Additional Treatment:  Patient left up in chair with call button in reach    ASSESSMENT:    Rehab potential is good    Activity tolerance: Good    Discharge recommendations: rehabilitation facility     Equipment recommendations: bedside commode, wheelchair, walker, rolling, hip kit     GOALS:   Multidisciplinary Problems       Occupational Therapy Goals           Problem: Occupational Therapy    Goal Priority Disciplines Outcome Interventions   Occupational Therapy Goal    Medium OT, PT/OT Ongoing, Progressing    Description: Patient will perform toileting with min assist using BSC    Patient will perform toilet/BSC transfer with mod assist with use of RW    Patient will perform lower body dressing with min assist while seated EOB, with use of DME                          Plan:  Patient to be seen 5 x/week, 6 x/week to address the above listed problems via therapeutic exercises, therapeutic activities  Plan of Care expires: 09/07/22  Plan of Care reviewed with: patient         08/30/2022

## 2022-08-30 NOTE — PT/OT/SLP PROGRESS
Physical Therapy Treatment    Patient Name:  Mary Lou Sierra   MRN:  [id number]    Recommendations:     Discharge Recommendations:  rehabilitation facility   Discharge Equipment Recommendations: walker, rolling, wheelchair   Barriers to discharge:  severity of deficits    Assessment:     Mary Lou Sierra is a 71 y.o. female admitted with a medical diagnosis of Displaced spiral fracture of shaft of left femur, initial encounter for closed fracture.  She presents with the following impairments/functional limitations:  weakness, impaired endurance, impaired functional mobility, impaired balance, decreased lower extremity function, pain, decreased ROM, orthopedic precautions.    Rehab Prognosis: Good; patient would benefit from acute skilled PT services to address these deficits and reach maximum level of function.    Recent Surgery: Procedure(s) (LRB):  INSERTION, INTRAMEDULLARY TERENCE, FEMUR (Left) 6 Days Post-Op    Plan:     During this hospitalization, patient to be seen daily to address the identified rehab impairments via therapeutic activities, therapeutic exercises, neuromuscular re-education, wheelchair management/training and progress toward the following goals:    Plan of Care Expires:  09/29/22    Subjective     Chief Complaint: pain  Patient/Family Comments/goals: to be independent  Pain/Comfort:  Pain Rating 1: 6/10  Location - Side 1: Left  Location 1: groin  Pain Addressed 1: Nurse notified      Objective:     Communicated with RN prior to session.  Patient found up in chair with peripheral IV upon PT entry to room.     General Precautions: Standard,     Orthopedic Precautions:LLE weight bearing as tolerated (for transfer only, no ambulation)   Braces: N/A  Respiratory Status: Room air     Functional Mobility:  Bed Mobility:     Sit to Supine: moderate assistance  Transfers:     Sit to Stand:  minimum assistance with rolling walker. Stand pivot tf from WC to bed w/ use of RW and [place].  Wheelchair  Propulsion:  Pt propelled Standard wheelchair x 300 feet on Level tile with  Bilateral upper extremity with Minimal Assistance.       AM-PAC 6 CLICK MOBILITY  Turning over in bed (including adjusting bedclothes, sheets and blankets)?: 3  Sitting down on and standing up from a chair with arms (e.g., wheelchair, bedside commode, etc.): 3  Moving from lying on back to sitting on the side of the bed?: 3  Moving to and from a bed to a chair (including a wheelchair)?: 3  Need to walk in hospital room?: 1  Climbing 3-5 steps with a railing?: 1  Basic Mobility Total Score: 14         Patient left HOB elevated with all lines intact, call button in reach, and RN notified..    GOALS:   Multidisciplinary Problems       Physical Therapy Goals          Problem: Physical Therapy    Goal Priority Disciplines Outcome Goal Variances Interventions   Physical Therapy Goal     PT, PT/OT Ongoing, Progressing     Description: Goals to be met by: 2022     Patient will increase functional independence with mobility by performin. Supine to sit with Modified [place]  2. Sit to supine with Modified [place]  3. Sit to stand transfer with Modified [place]  4. Wheelchair propulsion x500 feet with Modified [place] using bilateral uppper extremities                         Time Tracking:     PT Received On: 22  PT Start Time: 1030     PT Stop Time: 1045  PT Total Time (min): 13 min     Billable Minutes: Therapeutic Activity 15 mins    Treatment Type: Treatment  PT/PTA: PT     PTA Visit Number: 5     2022

## 2022-08-30 NOTE — PROGRESS NOTES
[facility] - [facility] Neuro  Orthopedics  Progress Note    Patient Name: Mary Lou Sierra  MRN: [id number]  Admission Date: 8/23/2022  Hospital Length of Stay: 7 days  Attending Provider: Silverio Vaughn MD  Primary Care Provider: Rosalinda Torres NP  Follow-up For: Procedure(s) (LRB):  INSERTION, INTRAMEDULLARY TERENCE, FEMUR (Left)    Post-Operative Day: 6 Day Post-Op  Subjective:     Principal Problem:Displaced spiral fracture of shaft of left femur, initial encounter for closed fracture    Principal Orthopedic Problem: same    Interval History: POD 6 IMN L femur. No acute events overnight. Pain well controlled with meds. No new complaints/issues today. No redness/drainage from incisions. Continues to work with PT on standing/transferring. CM is working on inpatient rehab placement.     Review of patient's allergies indicates:  No Known Allergies    Current Facility-Administered Medications   Medication    acetaminophen tablet 650 mg    ALPRAZolam tablet 1 mg    aluminum-magnesium hydroxide-simethicone 200-200-20 mg/5 mL suspension 30 mL    bisacodyL suppository 10 mg    docusate sodium capsule 100 mg    enoxaparin injection 30 mg    EScitalopram oxalate tablet 20 mg    folic acid tablet 1 mg    HYDROcodone-acetaminophen 5-325 mg per tablet 1 tablet    HYDROmorphone (PF) injection 1 mg    lorazepam injection 1 mg    melatonin tablet 6 mg    methocarbamoL tablet 500 mg    morphine injection 2 mg    multivitamin tablet    ondansetron 4 mg/5 mL solution 4 mg    ondansetron injection 4 mg    pantoprazole EC tablet 40 mg    polyethylene glycol packet 17 g    senna-docusate 8.6-50 mg per tablet 1 tablet    sodium chloride 0.9% flush 10 mL    thiamine tablet 100 mg     Objective:     Vital Signs (Most Recent):  Temp: 98.8 °F (37.1 °C) (08/30/22 0320)  Pulse: 69 (08/30/22 0320)  Resp: 18 (08/30/22 0320)  BP: (!) 144/76 (08/30/22 0320)  SpO2: 95 % (08/30/22 0320) Vital Signs (24h Range):  Temp:  [98 °F (36.7  "°C)-98.8 °F (37.1 °C)] 98.8 °F (37.1 °C)  Pulse:  [69-76] 69  Resp:  [16-20] 18  SpO2:  [92 %-96 %] 95 %  BP: (105-153)/(64-77) 144/76     Weight: 72.6 kg (160 lb)  Height: 5' 5" (165.1 cm)  Body mass index is 26.63 kg/m².      Intake/Output Summary (Last 24 hours) at 8/30/2022 0723  Last data filed at 8/30/2022 0400  Gross per 24 hour   Intake 600 ml   Output 1450 ml   Net -850 ml         Ortho/SPM Exam   General:  AAOx4. Well nourished, well perfused, no distress.   L LE  Dressings clean, dry, and intact  She has some swelling at the knee as expected  Thigh with mild swelling but soft and compressible; ecchymosis to posterior thigh  Tolerates gentle passive ROM of hip and knee  No calf tenderness  5/5 motor strength distally with dorsi/plantar flexion  BCR distally  SLT intact distally    Significant Labs:   Recent Lab Results         08/30/22  0426        Anion Gap 4.0       Baso # 0.03       Basophil % 0.4       BUN 13.3       BUN/CREAT RATIO 21       Calcium 8.8       Chloride 102       CO2 34       Creatinine 0.63       eGFR >60       Eos # 0.65       Eosinophil % 8.6       Glucose 100       Hematocrit 27.9       Hemoglobin 8.8       Immature Grans (Abs) 0.07       Immature Granulocytes 0.9       Lymph # 2.83       LYMPH % 37.4       MCH 32.2       MCHC 31.5       .2       Mono # 0.65       Mono % 8.6       MPV 10.7       Neut # 3.3       Neut % 44.1       nRBC 0.0       Platelets 301       Potassium 3.8       RBC 2.73       RDW 14.3       Sodium 140       WBC 7.6             All pertinent labs within the past 24 hours have been reviewed.    Significant Imaging: X-Ray: I have reviewed all pertinent results/findings and my personal findings are:  post op xray L femur demonstrates good fracture alignment with all hardware intact    Assessment/Plan:     Active Diagnoses:    Diagnosis Date Noted POA    PRINCIPAL PROBLEM:  Displaced spiral fracture of shaft of left femur, initial encounter for closed " fracture [S72.342A] 08/24/2022 Yes      Problems Resolved During this Admission:     Pt is doing well s/p IMN L Femur. H&H down a bit post op but pt asymptomatic with stable VS, continue to monitor. Continue judicious pain management. Continue PT daily. She can WBAT to L LE to stand/pivot for transfers; no ambulation. Ok for full ROM to L LE. Lovenox for dvt ppx. Continue daily dry dressing changes to incisions. CM is working on inpatient rehab placement. Ok for transfer once placed. Follow up with Dr. Vaughn in 2 weeks for a wound check and staple removal.     I, Annelise Duval, ORESTES, have scribed this note in the presence of Dr. Silverio Vaughn who has personally examined the patient and initiated the plan of care.

## 2022-08-31 ENCOUNTER — HOSPITAL ENCOUNTER (INPATIENT)
Facility: HOSPITAL | Age: 72
LOS: 9 days | Discharge: HOME HEALTH | DRG: 561 | End: 2022-09-09
Attending: INTERNAL MEDICINE | Admitting: INTERNAL MEDICINE
Payer: COMMERCIAL

## 2022-08-31 VITALS
WEIGHT: 160 LBS | DIASTOLIC BLOOD PRESSURE: 75 MMHG | RESPIRATION RATE: 18 BRPM | HEART RATE: 88 BPM | OXYGEN SATURATION: 97 % | SYSTOLIC BLOOD PRESSURE: 118 MMHG | HEIGHT: 65 IN | TEMPERATURE: 98 F | BODY MASS INDEX: 26.66 KG/M2

## 2022-08-31 DIAGNOSIS — S72.342A DISPLACED SPIRAL FRACTURE OF SHAFT OF LEFT FEMUR, INITIAL ENCOUNTER FOR CLOSED FRACTURE: Primary | ICD-10-CM

## 2022-08-31 DIAGNOSIS — Z87.81 S/P LEFT HIP FRACTURE: ICD-10-CM

## 2022-08-31 LAB — SARS-COV-2 RDRP RESP QL NAA+PROBE: NEGATIVE

## 2022-08-31 PROCEDURE — 94761 N-INVAS EAR/PLS OXIMETRY MLT: CPT

## 2022-08-31 PROCEDURE — 25000003 PHARM REV CODE 250: Performed by: EMERGENCY MEDICINE

## 2022-08-31 PROCEDURE — 97535 SELF CARE MNGMENT TRAINING: CPT

## 2022-08-31 PROCEDURE — 87635 SARS-COV-2 COVID-19 AMP PRB: CPT | Performed by: ORTHOPAEDIC SURGERY

## 2022-08-31 PROCEDURE — 94799 UNLISTED PULMONARY SVC/PX: CPT

## 2022-08-31 PROCEDURE — 97164 PT RE-EVAL EST PLAN CARE: CPT

## 2022-08-31 PROCEDURE — 25000003 PHARM REV CODE 250: Performed by: NURSE PRACTITIONER

## 2022-08-31 PROCEDURE — 97110 THERAPEUTIC EXERCISES: CPT

## 2022-08-31 PROCEDURE — 11800000 HC REHAB PRIVATE ROOM

## 2022-08-31 PROCEDURE — 97162 PT EVAL MOD COMPLEX 30 MIN: CPT

## 2022-08-31 PROCEDURE — 97530 THERAPEUTIC ACTIVITIES: CPT

## 2022-08-31 PROCEDURE — 25000003 PHARM REV CODE 250: Performed by: INTERNAL MEDICINE

## 2022-08-31 PROCEDURE — 97165 OT EVAL LOW COMPLEX 30 MIN: CPT

## 2022-08-31 PROCEDURE — 63600175 PHARM REV CODE 636 W HCPCS: Performed by: NURSE PRACTITIONER

## 2022-08-31 PROCEDURE — 27000221 HC OXYGEN, UP TO 24 HOURS

## 2022-08-31 PROCEDURE — 25000003 PHARM REV CODE 250: Performed by: PHYSICIAN ASSISTANT

## 2022-08-31 RX ORDER — POLYETHYLENE GLYCOL 3350 17 G/17G
17 POWDER, FOR SOLUTION ORAL 2 TIMES DAILY PRN
Status: DISCONTINUED | OUTPATIENT
Start: 2022-08-31 | End: 2022-09-09 | Stop reason: HOSPADM

## 2022-08-31 RX ORDER — HYDROCODONE BITARTRATE AND ACETAMINOPHEN 5; 325 MG/1; MG/1
1 TABLET ORAL EVERY 4 HOURS PRN
Status: DISCONTINUED | OUTPATIENT
Start: 2022-08-31 | End: 2022-09-09 | Stop reason: HOSPADM

## 2022-08-31 RX ORDER — NITROGLYCERIN 0.4 MG/1
0.4 TABLET SUBLINGUAL EVERY 5 MIN PRN
Status: DISCONTINUED | OUTPATIENT
Start: 2022-08-31 | End: 2022-09-09 | Stop reason: HOSPADM

## 2022-08-31 RX ORDER — HYDROCODONE BITARTRATE AND ACETAMINOPHEN 5; 325 MG/1; MG/1
1 TABLET ORAL EVERY 4 HOURS PRN
Qty: 42 TABLET | Refills: 0 | Status: ON HOLD | OUTPATIENT
Start: 2022-08-31 | End: 2022-09-09 | Stop reason: HOSPADM

## 2022-08-31 RX ORDER — PROMETHAZINE HYDROCHLORIDE 25 MG/1
25 TABLET ORAL EVERY 6 HOURS PRN
Status: DISCONTINUED | OUTPATIENT
Start: 2022-08-31 | End: 2022-09-09 | Stop reason: HOSPADM

## 2022-08-31 RX ORDER — THIAMINE HCL 100 MG
100 TABLET ORAL DAILY
Status: DISCONTINUED | OUTPATIENT
Start: 2022-09-01 | End: 2022-09-09 | Stop reason: HOSPADM

## 2022-08-31 RX ORDER — BISACODYL 10 MG
10 SUPPOSITORY, RECTAL RECTAL DAILY PRN
Status: DISCONTINUED | OUTPATIENT
Start: 2022-08-31 | End: 2022-09-09 | Stop reason: HOSPADM

## 2022-08-31 RX ORDER — DOCUSATE SODIUM 100 MG/1
100 CAPSULE, LIQUID FILLED ORAL 2 TIMES DAILY
Status: DISCONTINUED | OUTPATIENT
Start: 2022-08-31 | End: 2022-09-09 | Stop reason: HOSPADM

## 2022-08-31 RX ORDER — HYDRALAZINE HYDROCHLORIDE 20 MG/ML
10 INJECTION INTRAMUSCULAR; INTRAVENOUS EVERY 4 HOURS PRN
Status: DISCONTINUED | OUTPATIENT
Start: 2022-08-31 | End: 2022-09-09 | Stop reason: HOSPADM

## 2022-08-31 RX ORDER — ACETAMINOPHEN 325 MG/1
650 TABLET ORAL EVERY 6 HOURS
Status: DISCONTINUED | OUTPATIENT
Start: 2022-08-31 | End: 2022-09-09 | Stop reason: HOSPADM

## 2022-08-31 RX ORDER — METHOCARBAMOL 500 MG/1
500 TABLET, FILM COATED ORAL 3 TIMES DAILY
Status: DISCONTINUED | OUTPATIENT
Start: 2022-08-31 | End: 2022-09-09 | Stop reason: HOSPADM

## 2022-08-31 RX ORDER — ENOXAPARIN SODIUM 100 MG/ML
30 INJECTION SUBCUTANEOUS EVERY 12 HOURS
Status: DISCONTINUED | OUTPATIENT
Start: 2022-08-31 | End: 2022-09-09 | Stop reason: HOSPADM

## 2022-08-31 RX ORDER — ESCITALOPRAM OXALATE 20 MG/1
20 TABLET ORAL DAILY
Status: DISCONTINUED | OUTPATIENT
Start: 2022-09-01 | End: 2022-09-09 | Stop reason: HOSPADM

## 2022-08-31 RX ORDER — ALPRAZOLAM 1 MG/1
1 TABLET ORAL NIGHTLY PRN
Status: DISCONTINUED | OUTPATIENT
Start: 2022-08-31 | End: 2022-09-09 | Stop reason: HOSPADM

## 2022-08-31 RX ORDER — HYDROXYZINE PAMOATE 50 MG/1
50 CAPSULE ORAL NIGHTLY PRN
Status: DISCONTINUED | OUTPATIENT
Start: 2022-08-31 | End: 2022-09-09 | Stop reason: HOSPADM

## 2022-08-31 RX ORDER — FOLIC ACID 1 MG/1
1 TABLET ORAL DAILY
Status: DISCONTINUED | OUTPATIENT
Start: 2022-09-01 | End: 2022-09-09 | Stop reason: HOSPADM

## 2022-08-31 RX ORDER — PANTOPRAZOLE SODIUM 40 MG/1
40 TABLET, DELAYED RELEASE ORAL DAILY
Status: DISCONTINUED | OUTPATIENT
Start: 2022-09-01 | End: 2022-09-09 | Stop reason: HOSPADM

## 2022-08-31 RX ORDER — BENZONATATE 100 MG/1
100 CAPSULE ORAL 3 TIMES DAILY PRN
Status: DISCONTINUED | OUTPATIENT
Start: 2022-08-31 | End: 2022-09-09 | Stop reason: HOSPADM

## 2022-08-31 RX ORDER — LABETALOL HCL 20 MG/4 ML
10 SYRINGE (ML) INTRAVENOUS EVERY 4 HOURS PRN
Status: DISCONTINUED | OUTPATIENT
Start: 2022-08-31 | End: 2022-09-09 | Stop reason: HOSPADM

## 2022-08-31 RX ORDER — METOPROLOL TARTRATE 1 MG/ML
10 INJECTION, SOLUTION INTRAVENOUS
Status: DISCONTINUED | OUTPATIENT
Start: 2022-08-31 | End: 2022-09-09 | Stop reason: HOSPADM

## 2022-08-31 RX ORDER — METHOCARBAMOL 500 MG/1
500 TABLET, FILM COATED ORAL 3 TIMES DAILY PRN
Qty: 21 TABLET | Refills: 0 | Status: ON HOLD | OUTPATIENT
Start: 2022-08-31 | End: 2022-09-09 | Stop reason: HOSPADM

## 2022-08-31 RX ORDER — TALC
6 POWDER (GRAM) TOPICAL NIGHTLY PRN
Status: DISCONTINUED | OUTPATIENT
Start: 2022-08-31 | End: 2022-09-09 | Stop reason: HOSPADM

## 2022-08-31 RX ORDER — ONDANSETRON 4 MG/1
4 TABLET, ORALLY DISINTEGRATING ORAL EVERY 6 HOURS PRN
Status: DISCONTINUED | OUTPATIENT
Start: 2022-08-31 | End: 2022-09-09 | Stop reason: HOSPADM

## 2022-08-31 RX ORDER — ASPIRIN 81 MG/1
81 TABLET ORAL 2 TIMES DAILY
Qty: 60 TABLET | Refills: 0 | Status: ON HOLD | OUTPATIENT
Start: 2022-08-31 | End: 2022-09-09 | Stop reason: SDUPTHER

## 2022-08-31 RX ORDER — ENOXAPARIN SODIUM 100 MG/ML
30 INJECTION SUBCUTANEOUS EVERY 12 HOURS
Qty: 8.4 ML | Refills: 0 | Status: ON HOLD | OUTPATIENT
Start: 2022-08-31 | End: 2022-09-09 | Stop reason: HOSPADM

## 2022-08-31 RX ADMIN — Medication 100 MG: at 08:08

## 2022-08-31 RX ADMIN — METHOCARBAMOL 500 MG: 500 TABLET ORAL at 09:08

## 2022-08-31 RX ADMIN — DOCUSATE SODIUM 100 MG: 100 CAPSULE, LIQUID FILLED ORAL at 08:08

## 2022-08-31 RX ADMIN — HYDROCODONE BITARTRATE AND ACETAMINOPHEN 1 TABLET: 5; 325 TABLET ORAL at 08:08

## 2022-08-31 RX ADMIN — ALPRAZOLAM 1 MG: 1 TABLET ORAL at 08:08

## 2022-08-31 RX ADMIN — ENOXAPARIN SODIUM 30 MG: 30 INJECTION SUBCUTANEOUS at 08:08

## 2022-08-31 RX ADMIN — FOLIC ACID 1 MG: 1 TABLET ORAL at 08:08

## 2022-08-31 RX ADMIN — THERA TABS 1 TABLET: TAB at 08:08

## 2022-08-31 RX ADMIN — PANTOPRAZOLE SODIUM 40 MG: 40 TABLET, DELAYED RELEASE ORAL at 08:08

## 2022-08-31 RX ADMIN — ACETAMINOPHEN 325MG 650 MG: 325 TABLET ORAL at 05:08

## 2022-08-31 RX ADMIN — ESCITALOPRAM OXALATE 20 MG: 10 TABLET ORAL at 08:08

## 2022-08-31 RX ADMIN — SENNOSIDES AND DOCUSATE SODIUM 1 TABLET: 8.6; 5 TABLET ORAL at 08:08

## 2022-08-31 RX ADMIN — HYDROCODONE BITARTRATE AND ACETAMINOPHEN 1 TABLET: 5; 325 TABLET ORAL at 04:08

## 2022-08-31 NOTE — CONSULTS
[facility] Neuro  Inpatient Rehab Prescreen    PATIENT INFORMATION     Assessment date/time: 08/30/2022 2:12 PM    Name: Mary Lou Sierra Phone: [phone]   Address:   [address] [address]  [place] [address] SSN:    YOB: 1950 Age: 71 y.o. Gender: female   Race: White   Marital Status:    Advance Directives: Full code     COVERAGE INFORMATION     Patient Medicare #:      Primary Insurance Type: Blue Cross Blue Shield/[employer] [facility] Secondary Insurance Type:  / N/A   Policy #: [id number] Policy #:     Insurance contact name/number:  Insurance contact name/number:    Authorization #: [id number] Authorization #:    Verified Coverage: Insurance Carrier   Pending Coverage:    Prescription Coverage:  Insurance details/comments:      PHYSICIAN/REFERRAL INFORMATION     Primary Care Physician: Rosalinda Torres NP Attending Physician: Silverio Vaughn MD   Consulting physician/specialist:  Referring Physician:    Referring facility[place] [facility] [place]erring contact name/phone:    Physician details/comments:      CONTACT INFORMATION   Extended Emergency Contact Information  Primary Emergency Contact: CourtlandLesly  Mobile Phone: [phone]  Relation: Sister  Preferred language: English   needed? No    PRIOR LIVING SITUATION         Bedroom location/setup: 1st floor   Bathroom location/setup: 1st floor   Equipment at home: RW   Prior device use:  N/A     PRIOR LEVEL OF FUNCTION     Did a helper need to assist with the following activities prior to the current illness, exacerbation, or injury?   Self-care:  No, independent   Indoor mobility:  No, independent   Stairs: No, independent   Functional Cognition: No, independent   Comments:    Caregivers providing assistance:   Pre-hospital home care service:  Name of Agency:    Home/personal responsibilities: pt. Was completely independent with ADLs, mobility, stairs.  She was driving, cooking, cleaning. 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  Patient reports she fell 5 steps down in the front of her house, patient reports she lives alone and at the time she had to scoot herself to the phone to call the EMS.  Patient denies hitting her head or loss of consciousness.   Patient states she smokes at least 1 pack a day, drinks 3-4 glasses of liquor daily and last consumption was around 7 or 8:00 p.m. last night.  Denies any drug use.  Reported no history of alcohol withdrawals in the past when she would not drink for few days   States she takes amlodipine, hydrochlorothiazide, Lexapro and Xanax at night.  Reported she does not remember the doses of amlodipine, HCTZ.  Stated her PCP increase the dose of Lexapro from 10-20 mg and she thinks that she takes 10 mg of Xanax at night.  I informed patient that Xanax does not come in 10 mg.  Brother at bedside stated he will call home and check the doses informed the patient's nurse in ED   In the ED she went x-ray of the left femur which showed mid shaft fracture.   Patient was admitted to orthopedic service for surgical intervention.  Hospital medicine consulted for medical management.   Orthopedics was consulted patient is status post intramedullary nailing the left femur orthopedics following they recommended weight-bearing as tolerated to left  lower extremity to stand and  for transfers no ambulation okay for full range of motion to left lower extremity. Pt. Is participating with therapy. She will benefit from an inpatient rehab stay. This will help to increase patient's functional independence with ADLs and mobility prior to return home.  Pt. Requires acute inpatient rehab admission with 24-hour nursing and active physician oversight to monitor and manage acute medical comorbid conditions, labs, pain, and functional deficits.  Patient/family will also require teaching and integration of improving functional skills into daily living.  She will also require individualized, interdisciplinary approach to his care, receiving PT,OT services 3 hours per day, 5 days per week.    Required care cannot be provided at lower level of care. Patient is anticipated to require approximately 10-12 days LOS with expected discharge dorothy with HH.   Impairment group (IGC):   Femur (Shaft) Fracture 08.2 Etiologic diagnosis/description:   Left spiral femur shaft fracture   Date of onset:  8/23/2022 Date of surgery: IM nailing 8/24/2022   Allergies: Patient has no known allergies.   Comorbid condition: Anxiety and Hypertension   Medical/functional conditions requiring inpatient rehabilitation:   This patient requires medical management/24-hour nursing?of complex      comorbidities ( HTN, anxiety, nicotene dependence, regular alcohol use), labs,medications (see medications list), pain, incision care, sleep  hygiene, anticoagulation, nutrition, hydration, neurological,  pulmonary, and cardiac status, and preventive healthcare.          This patient requires intense therapy and an integrated,    interdisciplinary approach to address safety, impaired mobility,    impaired ADLs (dressing, toileting, grooming, showering),   pulmonary insufficiency,  preventive healthcare, medication management, integration of  improving functional skills into daily living, and home caregiver   "  support and training.    Risk for medical/clinical complications:   This patient is at risk for the following complications: DVT/PE, pneumonia,    malnutrition, neurological decline, respiratory insufficiency,    worsening activity intolerance, complications from anticoagulation,    Incision infection, skin breakdown, inadequate  sleep, and constipation.           SPECIAL REHABILITATION NEEDS     Requires O2: 2 liters Preferred Language: English           PRECAUTIONS     Orthotic device type: knee immobilizer, Wearning schedule: with therapy, Safety/fall precautions: Recent history of falls and High fall risk, and Weight-bearing status: Weight-bearing as tolerated: for transfers only, no ambulation     PAST MEDICAL, SOCIAL, FAMILY HISTORY     Pertinent past medical history:   Past Medical History:   Diagnosis Date    Hypertension       Has the patient had two or more falls in the past year or any fall with injury in the past year: YES   Has the patient had major surgery during the 100 days prior to admission: YES   Family Medical History: History reviewed. No pertinent family history.   Substance use history:   Social History     Substance and Sexual Activity   Alcohol Use None     Social History     Tobacco Use   Smoking Status Not on file   Smokeless Tobacco Not on file     Social History     Substance and Sexual Activity   Drug Use Not on file      VITALS   BP:  102/65     Temp: 98.7 °F (37.1 °C)     HR: 76     Resp: 15     SpO2: (!) 93 %     Height: 5' 5" (1.651 m)     Weight: 72.6 kg (160 lb)     BMI: 26.6          MED/LABS/DIAGNOSITICS   Current Facility-Administered Medications   Medication Dose Route Frequency Provider Last Rate Last Admin    acetaminophen tablet 650 mg  650 mg Oral Q6H PRN ALISHA Medrano        ALPRAZolam tablet 1 mg  1 mg Oral Nightly PRN Usman Urbina MD   1 mg at 08/29/22 2146    aluminum-magnesium hydroxide-simethicone 200-200-20 mg/5 mL suspension 30 mL  30 mL Oral Q6H PRN " ALISHA Medrano        bisacodyL suppository 10 mg  10 mg Rectal Daily PRN ALISHA Medrano        docusate sodium capsule 100 mg  100 mg Oral BID ALISHA Medrano   100 mg at 08/30/22 0929    enoxaparin injection 30 mg  30 mg Subcutaneous Q12H ALISHA Medrano   30 mg at 08/30/22 0928    EScitalopram oxalate tablet 20 mg  20 mg Oral Daily Usman Urbina MD   20 mg at 08/30/22 0929    folic acid tablet 1 mg  1 mg Oral Daily SHAYNE Hill   1 mg at 08/30/22 0930    HYDROcodone-acetaminophen 5-325 mg per tablet 1 tablet  1 tablet Oral Q4H PRN ALISHA Medrano   1 tablet at 08/30/22 1407    HYDROmorphone (PF) injection 1 mg  1 mg Intravenous Q4H PRN Leonidas Hadley III, MD        lorazepam injection 1 mg  1 mg Intravenous PRN SHAYNE Hill        melatonin tablet 6 mg  6 mg Oral Nightly PRN Leonidas Hadley III, MD        methocarbamoL tablet 500 mg  500 mg Oral TID PRN ALISHA Medrano   500 mg at 08/30/22 1407    morphine injection 2 mg  2 mg Intravenous Q4H PRN ALISHA Medrano        multivitamin tablet  1 tablet Oral Daily SHAYNE Hill   1 tablet at 08/30/22 0929    ondansetron 4 mg/5 mL solution 4 mg  4 mg Oral Q6H PRN ALISHA Medrano        ondansetron injection 4 mg  4 mg Intravenous Q8H PRN Leonidas Hadley III, MD        pantoprazole EC tablet 40 mg  40 mg Oral Daily ALISHA Medrano   40 mg at 08/30/22 0930    polyethylene glycol packet 17 g  17 g Oral Daily PRN ALISHA Medrano   17 g at 08/27/22 2008    senna-docusate 8.6-50 mg per tablet 1 tablet  1 tablet Oral BID Leonidas Hadley III, MD   1 tablet at 08/30/22 0929    sodium chloride 0.9% flush 10 mL  10 mL Intravenous PRN Leonidas Hadley III, MD        thiamine tablet 100 mg  100 mg Oral Daily SHAYNE Hill   100 mg at 08/30/22 0929      Pertinent lab results:   Lab Results   Component Value Date    WBC 7.6 08/30/2022    HGB 8.8 (L) 08/30/2022    HCT 27.9 (L)  08/30/2022     08/30/2022     08/30/2022    K 3.8 08/30/2022    BUN 13.3 08/30/2022    CO2 34 (H) 08/30/2022      Pertinent diagnostic studies:    Additional labs and diagnostic studies required prior to admission:      QI: GG Care Tool     QI: GG Care Tool  Therapy Evaluation   Swallowing/  Nutritional Status   Diet/Feeding/  Swallowing    Eating       Oral Hygiene   Grooming    Toileting Hygiene       Shower/Bathe   Bathing    Upper Body Dressing   Dressing Upper    Lower Body Dressing   Dressing Lower Min assist to don/doff socks   Putting On/Taking Off Footwear       Toilet Transfer   Toileting    Bladder Continence Status   Bladder     Bowel Continence Status   Bowel     Roll Left and Right   Bed Mobility Sit to supine mod assist  Scooting standby assist   Sit to Lying       Lying to Sitting on Side of Bed       Sit to Stand       Chair/Bed-to- Chair   Transfers Sit to stand min assist with RW  Toilet transfer min assist   Car Transfer       Walk 10 Feet   Equipment RW     Walk 50 Feet with Two Turns   Balance    Walk 150 Feet   Endurance    Walk 10 Feet on Uneven Surfaces   Gait N/A   Picking up an Object       Wheel 50 Feet with Two Turns   Wheelchair Min assist for 300 ft.   Wheel 150 Feet       1 Step (Curb)   Stairs    4 Steps       12 Steps       Expression of Ideas and Wants   Communication    Understanding  Verbal and Non-Verbal Content       Cognitive Patterns   Cognition       Safety Precautions       Lower Extremity      Strength       ROM       Upper Extremity      Strength       ROM      Care Score Value Definitions  6: Independent. [place] provides no assistance with tasks. A device may or may not have been used.  5: Set-up or clean-up assistance. [place] sets up or cleans up, but does not assist with tasks. [place] may have assisted prior to or following the activity.  4: Supervision or touching assistance. [place] provides verbal cues or touching/steadying or contact guard assistance.  Assistance may be provided throughout the activity or intermittently.  3: Partial/moderate assistance. [place] does less than half the effort. [place] lifts, holds, or supports trunk or limbs, but provides less than half the effort.  2: Substantial/maximal assistance. [place] does more than half the effort. [place] lifts or holds trunk or limbs, and provides more than half the effort.  1: Dependent. [place] does all of the effort, or the assistance of two or more helpers is required for the patient to complete the activity.  Care Score Activity Not Attempted Value Definitions  7: Patient refused.  9: Not applicable. Not attempted and the patient did not perform this activity prior to the current illness, exacerbation, or injury.  10: Not attempted due to environmental limitations (e.g., lack of equipment, weather constraints).  88: Not attempted due to medical condition or safety concerns.    DISCHARGE GOALS/ANTICPATED INTERVENTIONS/SERVICES     Expected Level of Improvement for Safe Discharge: Anticipate discharge home at or near baseline level of functioning and/or decreased burden of care.               Patient/Family/Caregiver Goals:    Required Treatments/Services: Rehabilitation Nursing, Dietitian/nutrition, Social , and Case Management   Required Therapy Therapy Type Min/Day Days/Week Duration of Therapy   Physical Therapy 90 5 10-12 days   Occupational Therapy 90 5 10-12 days   Speech and Language Pathology      Prosthetic/Orthotics      Recreational Therapy      Anticipated Services upon Discharge:  Home health vs outpatient therapy PT/OT   Additional rehabilitation needs: N/A   Expected Discharge Destination:  Home with    Barriers to Discharge: Lives alone   Discharge Support: Patient lives alone, Sister lives in the same yard and will be able to help.   Patient/Family/Caregiver Orientation: Patient/family/caregiver oriented and agreeable to inpatient rehabilitation plan   Estimated Length of Stay: 10-12  days   Projected Admission Date: 08/31/2022   Medical Prognosis: good   Physicians Review and Admission Determination:   I have discussed patient with Nurse Liaison. I have reviewed the prescreen. Patient is in need of, appropriate for and should tolerate and benefit from an aggressive IRF stay.

## 2022-08-31 NOTE — PT/OT/SLP RE-EVAL
Physical Therapy Re-evaluation    Patient Name:  Mary Lou Sierra   MRN:  [id number]    Recommendations:     Discharge Recommendations:  rehabilitation facility   Discharge Equipment Recommendations: walker, rolling   Barriers to discharge:       Assessment:     Mary Lou Sierra is a 71 y.o. female admitted with a medical diagnosis of Displaced spiral fracture of shaft of left femur, initial encounter for closed fracture.  She presents with the following impairments/functional limitations:  weakness, impaired endurance, impaired functional mobility, decreased lower extremity function, pain, decreased ROM, orthopedic precautions.    Rehab Prognosis:  good; patient would benefit from acute skilled PT services to address these deficits and reach maximum level of function.      Recent Surgery: Procedure(s) (LRB):  INSERTION, INTRAMEDULLARY TERENCE, FEMUR (Left) 7 Days Post-Op    Plan:     During this hospitalization, patient to be seen daily to address the above listed problems via gait training, therapeutic activities, neuromuscular re-education, therapeutic exercises  Plan of Care Expires:  09/30/22  Plan of Care Reviewed with: patient    Subjective     Communicated with RN prior to session.  Patient found HOB elevated with peripheral IV upon PT entry to room, agreeable to evaluation.      Chief Complaint: pain  Patient comments/goals: to be independent  Pain/Comfort:  Pain Rating 1: 5/10  Location - Side 1: Left  Location 1: leg  Pain Addressed 1: Nurse notified    Patients cultural, spiritual, Confucianism conflicts given the current situation: no      Objective:     Patient found with: peripheral IV     General Precautions: Standard,     Orthopedic Precautions:LLE weight bearing as tolerated (for tf only and no ambulation)   Braces: N/A  Respiratory Status: Room air    Exams:  Cognitive Exam:  Patient is oriented to Person, Place, Time, and Situation  LLE ROM: Deficits: moderately limited  LLE Strength: Deficits:  gamal 3-/5     Functional Mobility:  Bed Mobility:     Supine to Sit: minimum assistance  Transfers:     Sit to Stand:  minimum assistance with rolling walker  Bed to Chair: minimum assistance with  rolling walker  using  Stand Pivot  Wheelchair Propulsion:  Pt propelled Standard wheelchair x 300 feet on Level tile with  Bilateral upper extremity and Right lower extremity with Minimal Assistance. Assist required to navigate obstacles and doorways.    AM-PAC 6 CLICK MOBILITY  Total Score:14       Patient left up in chair with all lines intact, call button in reach, and RN notified.    GOALS:   Multidisciplinary Problems       Physical Therapy Goals          Problem: Physical Therapy    Goal Priority Disciplines Outcome Goal Variances Interventions   Physical Therapy Goal     PT, PT/OT Ongoing, Progressing     Description: Goals to be met by: 2022     Patient will increase functional independence with mobility by performin. Supine to sit with Modified [place]  2. Sit to supine with Modified [place]  3. Sit to stand transfer with Modified [place]  4. Wheelchair propulsion x500 feet with Modified [place] using bilateral uppper extremities                         History:     Past Medical History:   Diagnosis Date    Hypertension        History reviewed. No pertinent surgical history.    Time Tracking:     PT Received On: 22  PT Start Time: 1019     PT Stop Time: 1032  PT Total Time (min): 13 min     Billable Minutes: Re-eval 13 mins      2022

## 2022-08-31 NOTE — PLAN OF CARE
Accepted to [facility] rehab today for 1pm ,  pending covid results.  Nurse Gisselle is aware. Notified  pt and [facility] rehab. Will be transported per Sofia

## 2022-08-31 NOTE — PLAN OF CARE
Problem: Adult Inpatient Plan of Care  Goal: Plan of Care Review  Outcome: Met  Goal: Patient-Specific Goal (Individualized)  Outcome: Met  Goal: Absence of Hospital-Acquired Illness or Injury  Outcome: Met  Goal: Optimal Comfort and Wellbeing  Outcome: Met  Goal: Readiness for Transition of Care  Outcome: Met     Problem: Skin Injury Risk Increased  Goal: Skin Health and Integrity  Outcome: Met     Problem: Infection  Goal: Absence of Infection Signs and Symptoms  Outcome: Met     Problem: Fall Injury Risk  Goal: Absence of Fall and Fall-Related Injury  Outcome: Met

## 2022-08-31 NOTE — PLAN OF CARE
Problem: Physical Therapy  Goal: Physical Therapy Goal  Description: Short Term goals      Bed Mobility   Roll Right and Left Setup or Clean-up Assistance .  Lying to sitting Setup or Clean-up Assistance .  Sitting to lying Setup or Clean-up Assistance .    Transfers    Pt will be able to perform Stand pivot  chair/bed to chair transfer With RW Setup or Clean-up Assistance .  Pt will be able to perform Sit to stand with RW Setup or Clean-up Assistance .  Pt will be able to perform Car transfer with RW Setup or Clean-up Assistance .    Wheelchair Mobility   Pt will be able to propel 500 feet in rubens wheelchair Independent.      Timeframe: By Re-eval    Outcome: Ongoing, Progressing

## 2022-08-31 NOTE — PT/OT/SLP EVAL
"Physical Therapy Rehab Evaluation    Patient Name:  Mary Lou Sierra   MRN:  [id number]    Recommendations:     Discharge Recommendations:  home health PT   Discharge Equipment Recommendations: wheelchair   Barriers to discharge:  orthopedic precautions    Assessment:     Mary Lou Sierra is a 71 y.o. female admitted with a medical diagnosis of S/p left hip fracture.  She presents with the following impairments/functional limitations:  weakness, impaired endurance, impaired sensation, impaired functional mobility, impaired balance, decreased lower extremity function, decreased safety awareness, pain, decreased ROM, edema, orthopedic precautions.    Rehab Diagnosis:     Recent Surgery: * No surgery found *      General Precautions: Standard, fall     Orthopedic Precautions: LLE weight bearing as tolerated (transfers only)     Braces: N/A    Rehab Prognosis: Good and Fair; patient would benefit from acute skilled PT services to address these deficits and reach maximum level of function.      History:     Past Medical History:   Diagnosis Date    Hypertension        No past surgical history on file.    Subjective     Chief Complaint:   Patient/Family Comments/goals: "get back home and resume my life"    Vitals   Vitals at Rest  BP     HR     O2 Sat     Pain       Vitals With Activity  BP     HR     O2 Sat     Pain       Respiratory Status: Room air    Patients cultural, spiritual, Nondenominational conflicts given the current situation: no       Living Environment  Lives With: alone  Number of Stairs, Main Entrance: four  Stair Railings, Main Entrance: railings on both sides of stairs    Prior Level of Function       Equipment used at home:  .  DME owned (not currently used): rolling walker and single point cane.      Upon discharge, patient will have assistance from family and friends.    Objective:     Communicated with OT prior to session.  Patient found up in chair with blood pressure cuff  upon PT entry to " room.    Exams  Cognitive Exam:  Patient is oriented to Person, Place, Time, and Situation  Sensation: -       WNL  RLE ROM: -       WFL  RLE Strength: -       Deficits,     Comment   Hip Flexion 4-/5 With pain   Hip Extension     Hip Abduction 4/5    Hip Adduction 4/5    Hip External Rotation     Hip Internal Rotation     Knee Flexion 4/5    Knee Extension 4/5    Ankle Dorsiflexion 4/5    Ankle Plantarflexion     Ankle Eversion     Ankle Inversion     Great Toe Extension       LLE ROM:          - limited 2/2 post-op pain  LLE Strength:          - NT due to sx site    Functional Mobility  Wheelchair Propulsion:  Pt propelled Standard wheelchair x 350 feet on Ramp and uneven surfaces with  Bilateral upper extremity and Right lower extremity with Moderate Assistance.   Mod A for directional changes, frequently veers to L     GGs   Current   Status   Functional Area: Care Score:   Roll Left and Right 3   Sit to Lying 4   Lying to Sitting on Side of Bed 4   Sit to Stand 3   Chair/Bed-to-Chair Transfer 4   Car Transfer 3   Walk 10 Feet 88   Walk 50 Feet with Two Turns 88   Walk 150 Feet 88   Walk 10 Feet Uneven Surface 88   1 Step (Curb) 88   4 Steps 88   12 Steps 88   Picking Up Object 3   Wheel 50 Feet with Two Turns 3   Wheel 150 Feet 3  Mod A for directional assistance as pt has difficulty maintaining midline     Therapeutic Activities and Exercises:  Seated there-ex, 3x15 each  2# ankle weight on R LE  Hip flexion, LAQ, hip abduction, adduction, ankle pumps  W/c push ups 2x10  W/c to bed stand pivot transfer with RW, CGA     Activity Tolerance    Patient left supine with all lines intact, call button in reach, and bed alarm on.    Education Provided: roles and goals of PT/PTA, transfer training, bed mob, balance training, safety awareness, wheelchair management, strengthening exercises, and oriented to student    Expected compliance: Moderate compliance    GOALS:   Multidisciplinary Problems       Physical Therapy  Goals          Problem: Physical Therapy    Goal Priority Disciplines Outcome Goal Variances Interventions   Physical Therapy Goal     PT, PT/OT Ongoing, Progressing     Description: Short Term goals      Bed Mobility   Roll Right and Left Setup or Clean-up Assistance .  Lying to sitting Setup or Clean-up Assistance .  Sitting to lying Setup or Clean-up Assistance .    Transfers    Pt will be able to perform Stand pivot  chair/bed to chair transfer With RW Setup or Clean-up Assistance .  Pt will be able to perform Sit to stand with RW Setup or Clean-up Assistance .  Pt will be able to perform Car transfer with RW Setup or Clean-up Assistance .    Wheelchair Mobility   Pt will be able to propel 500 feet in rubens wheelchair Independent.      Timeframe: By Re-eval                         Plan:     During this hospitalization, patient to be seen 5 x/week to address the identified rehab impairments via therapeutic activities, therapeutic exercises, wheelchair management/training and progress toward the following goals:    Plan of Care Expires:  09/06/22    Time Tracking:     PT Received On:    Time In 1515     Time Out 1715  Total Time 120 min  Therapy Time PT Start Time: 1515  PT Stop Time: 1715  PT Total Time (min): 120 min  PT Individual: 120  Missed Time:    Time Missed due to:      Billable Minutes: Evaluation 45, Therapeutic Activity 15, and Therapeutic Exercise 30    08/31/2022

## 2022-08-31 NOTE — PROGRESS NOTES
[facility] Hospital Medicine consultation f/u Note        Chief Complaint: Inpatient Follow-up for fall down the stairs with left mid shaft femur fracture     HPI: 71-year-old female with known past medical history of hypertension, anxiety, nicotine dependence, regular alcohol use admitted 08/23 with a diagnosis of comminuted left femoral shaft fracture due to fall at her home of the steps.  Patient reports she fell 5 steps down in the front of her house, patient reports she lives alone and at the time she had to scoot herself to the phone to call the EMS.  Patient denies hitting her head or loss of consciousness.   Patient states she smokes at least 1 pack a day, drinks 3-4 glasses of liquor daily and last consumption was around 7 or 8:00 p.m. last night.  Denies any drug use.  Reported no history of alcohol withdrawals in the past when she would not drink for few days   States she takes amlodipine, hydrochlorothiazide, Lexapro and Xanax at night.  Reported she does not remember the doses of amlodipine, HCTZ.  Stated her PCP increase the dose of Lexapro from 10-20 mg and she thinks that she takes 10 mg of Xanax at night.  I informed patient that Xanax does not come in 10 mg.  Brother at bedside stated he will call home and check the doses informed the patient's nurse in ED   In the ED she went x-ray of the left femur which showed mid shaft fracture.   Patient is admitted to orthopedic service for surgical intervention.  Hospital medicine consulted for medical management.   Orthopedics was consulted patient is status post intramedullary nailing the left femur orthopedics following they recommended weight-bearing as tolerated to left lower extremity to stand and 5 overt for transfers no ambulation okay for full range of motion to left lower extremity  Interval Hx:   Patient seen and examined this morning no complaints today was drinking coffee Objective/physical exam:  General: In no  acute distress, afebrile, laying in bed, mild head tremor noted   Chest: Clear to auscultation bilaterally   Heart: S1, S2, no appreciable murmur   Abdomen: Soft, nontender, BS +   MSK:  Decreased range of motion in the left lower extremity  Neurologic: Alert and oriented x4     VITAL SIGNS: 24 HRS MIN & MAX LAST   Temp  Min: 97.9 °F (36.6 °C)  Max: 98.5 °F (36.9 °C) 97.9 °F (36.6 °C)   BP  Min: 118/75  Max: 171/81 118/75   Pulse  Min: 67  Max: 88  88   Resp  Min: 15  Max: 19 18   SpO2  Min: 90 %  Max: 97 % 97 %       Recent Labs   Lab 08/26/22 0410 08/27/22  0451 08/30/22  0426   WBC 11.0 9.5 7.6   RBC 3.00* 2.87* 2.73*   HGB 9.6* 9.3* 8.8*   HCT 31.0* 29.5* 27.9*   .3* 102.8* 102.2*   MCH 32.0* 32.4* 32.2*   MCHC 31.0* 31.5* 31.5*   RDW 14.3 14.3 14.3    208 301   MPV 11.6* 10.9* 10.7*       Recent Labs   Lab 08/26/22 0410 08/27/22 0451 08/30/22  0426    138 140   K 3.5 3.4* 3.8   CO2 28 29 34*   BUN 16.9 13.7 13.3   CREATININE 0.71 0.61 0.63   CALCIUM 9.0 9.0 8.8          Microbiology Results (last 7 days)       ** No results found for the last 168 hours. **             See below for Radiology    Scheduled Med:   docusate sodium  100 mg Oral BID    enoxaparin  30 mg Subcutaneous Q12H    EScitalopram oxalate  20 mg Oral Daily    folic acid  1 mg Oral Daily    multivitamin  1 tablet Oral Daily    pantoprazole  40 mg Oral Daily    senna-docusate 8.6-50 mg  1 tablet Oral BID    thiamine  100 mg Oral Daily        Continuous Infusions:       PRN Meds:  acetaminophen, ALPRAZolam, aluminum-magnesium hydroxide-simethicone, bisacodyL, HYDROcodone-acetaminophen, HYDROmorphone, lorazepam, melatonin, methocarbamoL, morphine, ondansetron, ondansetron, polyethylene glycol, sodium chloride 0.9%       Assessment/Plan:  Comminuted left femoral shaft fracture due to fall at home   status post intramedullary nailing   fall  Hypokalemia/hyponatremia   Constipation  Alcohol abuse - monitor for alcohol withdrawal    Tobacco abuse   History of hypertension    History of anxiety   DNR status        Methocarbamol as needed for muscle spasm  Case management is working on placement  patient is status post intramedullary nailing done on August 24th tolerated the procedure well   PT and OT following Pain is controlled   continue with thiamine and folate and multivitamin   Orthopedics following   Blood pressure is stable off the [facility]  Case management consulted for rehab placement  Awaiting placement    VTE prophylaxis:  Lovenox  Patient condition:  Stable    Anticipated discharge and Disposition:    Probable rehab      All diagnosis and differential diagnosis have been reviewed; assessment and plan has been documented; I have personally reviewed the labs and test results that are presently available; I have reviewed the patients medication list; I have reviewed the consulting providers response and recommendations. I have reviewed or attempted to review medical records based upon their availability    All of the patient's questions have been  addressed and answered. Patient's is agreeable to the above stated plan. I will continue to monitor closely and make adjustments to medical management as needed.  _____________________________________________________________________    Nutrition Status:    Radiology:  SURG FL Surgery Fluoro Usage  See OP Notes for results.     IMPRESSION: See OP Notes for results.     This procedure was auto-finalized by: Virtual Radiologist  X-Ray Femur 2 View Left  Narrative: EXAMINATION:  XR FEMUR 2 VIEW LEFT    CPT: 11216    CLINICAL HISTORY:  post-op;    FINDINGS:  There has been interval insertion of an intramedullary alva traversing a fracture of the midshaft and distal aspect of the femur position and alignment of the visualized osseous structures is anatomical multiple orthopedic screws identified in the distal femur  Impression: Internal fixation    Electronically signed by: Niles  Madonna  Date:    08/24/2022  Time:    12:41      Jimena Casper MD   08/31/2022

## 2022-08-31 NOTE — PROGRESS NOTES
[facility]  Orthopedics  Progress Note    Patient Name: Mary Lou Sierra  MRN: [id number]  Admission Date: 8/23/2022  Hospital Length of Stay: 8 days  Attending Provider: Silverio Vaughn MD  Primary Care Provider: Rosalinda Torres NP  Follow-up For: Procedure(s) (LRB):  INSERTION, INTRAMEDULLARY TERENCE, FEMUR (Left)    Post-Operative Day: 7 Day Post-Op  Subjective:     Principal Problem:Displaced spiral fracture of shaft of left femur, initial encounter for closed fracture    Principal Orthopedic Problem: same    Interval History: POD 7 IMN L femur. No new complaints/issues today. Pain remains well controlled. No redness/drainage from incisions. Continues to work with PT on standing/transferring. CM is working on inpatient rehab placement. Patient is very eager to move on to inpatient rehab.     Review of patient's allergies indicates:  No Known Allergies    Current Facility-Administered Medications   Medication    acetaminophen tablet 650 mg    ALPRAZolam tablet 1 mg    aluminum-magnesium hydroxide-simethicone 200-200-20 mg/5 mL suspension 30 mL    bisacodyL suppository 10 mg    docusate sodium capsule 100 mg    enoxaparin injection 30 mg    EScitalopram oxalate tablet 20 mg    folic acid tablet 1 mg    HYDROcodone-acetaminophen 5-325 mg per tablet 1 tablet    HYDROmorphone (PF) injection 1 mg    lorazepam injection 1 mg    melatonin tablet 6 mg    methocarbamoL tablet 500 mg    morphine injection 2 mg    multivitamin tablet    ondansetron 4 mg/5 mL solution 4 mg    ondansetron injection 4 mg    pantoprazole EC tablet 40 mg    polyethylene glycol packet 17 g    senna-docusate 8.6-50 mg per tablet 1 tablet    sodium chloride 0.9% flush 10 mL    thiamine tablet 100 mg     Objective:     Vital Signs (Most Recent):  Temp: 97.9 °F (36.6 °C) (08/31/22 0734)  Pulse: 88 (08/31/22 1123)  Resp: 18 (08/31/22 1123)  BP: 118/75 (08/31/22 1123)  SpO2: 97 % (08/31/22 1123) Vital Signs (24h  "Range):  Temp:  [97.9 °F (36.6 °C)-98.5 °F (36.9 °C)] 97.9 °F (36.6 °C)  Pulse:  [67-88] 88  Resp:  [15-19] 18  SpO2:  [90 %-97 %] 97 %  BP: (118-171)/(63-83) 118/75     Weight: 72.6 kg (160 lb)  Height: 5' 5" (165.1 cm)  Body mass index is 26.63 kg/m².      Intake/Output Summary (Last 24 hours) at 8/31/2022 1215  Last data filed at 8/31/2022 1000  Gross per 24 hour   Intake 660 ml   Output 1150 ml   Net -490 ml         Ortho/SPM Exam   General:  AAOx4. Well nourished, well perfused, no distress.   L LE  Incisions are clean, dry, and intact  She has some swelling at the knee as expected  Thigh is soft and compressible; ecchymosis to posterior thigh  Tolerates gentle passive ROM of hip and knee  No calf tenderness  5/5 motor strength distally with dorsi/plantar flexion  BCR distally  SLT intact distally    Significant Labs:   Recent Lab Results       None          All pertinent labs within the past 24 hours have been reviewed.    Significant Imaging: X-Ray: I have reviewed all pertinent results/findings and my personal findings are:  post op xray L femur demonstrates good fracture alignment with all hardware intact    Assessment/Plan:     Active Diagnoses:    Diagnosis Date Noted POA    PRINCIPAL PROBLEM:  Displaced spiral fracture of shaft of left femur, initial encounter for closed fracture [S72.342A] 08/24/2022 Yes      Problems Resolved During this Admission:     Pt is doing well s/p IMN L Femur. Continue judicious pain management. Continue PT daily. She can WBAT to L LE to stand/pivot for transfers; no ambulation. Ok for full ROM to L LE. Lovenox for dvt ppx. Continue daily dry dressing changes to incisions. Plan for transfer to inpatient rehab today. Dc orders and rx complete. Follow up with Dr. Vaughn in 2 weeks for a wound check and staple removal.     The above findings, diagnosis, and treatment plan were discussed with Dr. iSlverio Vaughn who is in agreement.      "

## 2022-08-31 NOTE — PLAN OF CARE
Mary Lou Sierra age 71 *LLE WBAT transfers only no ambulation     Dx: Fall, Comminuted left femoral shaft fracture s/p IM nailing 8/23/2022. Pt. Has a history of HTN, anxiety, and nicotine dependence. *See chart for full and complete medical history*     Hx mental health/substance abuse: Pt. Reports that she has a history of depression and anxiety. She is a smoker.      Insurance: [employer] [place]     Education//Work Hx: Pt. Completed high school. She has no  history. 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     Pt. Is . Her sister, Lesly, lives in the same yard as her. She will be able to help her after discharge from rehab.      Children (0) Friends/Family: 1) Lesly Sierra, sister ([phone])     Power of  (yes-Lesly)/Living Will (yes)     Prior Level of Fx: Independent ADLs, drives, cooks, does chores, does laundry, grocery shops, manages finances, manages her own meds, and does yard work. She does not have a medic alert.      Residence: Pt. Lives alone in [place] in a mobile home with 5 steps bilateral rails. She uses a walk-in shower with a small threshold. No grab bars and no HHS. She has an elevated toilet. No grab bars adjacent.      DME: RW. She will use DME company approved per insurance.     HH/OP tx: No history of HH. She will use HH company approved per insurance.      Pharmacy: [facility]/ (has prescription drug coverage)      FOC was obtained for DME and HH companies approved per insurance.      MD(s): PCP: Rosalinda Torres NP ([phone]); Ortho: Dr. Silverio Vaughn ([phone]). Follow-up appointment 9/14/2022 at 8 AM for suture removal and wound recheck.

## 2022-08-31 NOTE — PT/OT/SLP EVAL
"[facility] Therapy Inpatient Rehab Evaluation    Name: Mary Lou Sierra  MRN: [id number]    Recommendations:     Discharge Recommendations:    Home c HH  Discharge Equipment Recommendations: bedside commode, shower chair   Barriers to discharge: Inaccessible home and Decreased caregiver support    Assessment:  Mary Lou Sierra is a 71 y.o. female admitted with a medical diagnosis of S/p left hip fracture.  She presents with the following impairments/functional limitations:    decreased self care,functional transfers.    General Precautions: Standard, fall     Orthopedic Precautions:LLE weight bearing as tolerated (Transfers only; no ambulation)         Rehab Prognosis: Good; patient would benefit from acute skilled OT services to address these deficits and reach maximum level of function.      History:     Past Medical History:   Diagnosis Date    Hypertension        No past surgical history on file.    Subjective     Orientation: Oriented x4    Chief Complaint: None; Pt. Very motivated    Patient/Family Comments/goals: "I want to be on my own again; Be Independent"    Vitals  Vitals at Rest  BP (!) 155/86     HR 73   O2 Sat     Pain Pain Rating 1: 0/10     Vitals With Activity  BP (!) 155/86     HR 73   O2 Sat     Pain Pain Rating 1: 0/10     Respiratory Status: Room air    Patients cultural, spiritual, Baptist conflicts given the current situation:         Living Environment   Living Environment  Lives With: alone  Number of Stairs, Main Entrance: four  Stair Railings, Main Entrance: railings on both sides of stairs  Shower Setup: Walk-in shower c small ledge    Prior Level of Function  BADL: Independent    IADL: Independent    Equipment used at home:  .  DME owned (not currently used): none.      Upon discharge, patient will have assistance from her sister.    Objective:     Patient found supine with  SHERRY Duong  upon OT entry to room.    Mobility   Patient completed:  Supine to Sit with contact guard " assistance  Sit to Stand Transfer with minimum assistance with rolling walker  Toilet Transfer Stand Pivot technique with minimum assistance with  rolling walker and bedside commode      ADLs     Current Status   Eating 6   Oral Hygiene 5   Shower, Bathe Self     Upper Body Dressing 5   Lower Body Dressing 3   Toileting Hygiene     Toilet Transfer     Putting On, Taking Off Footwear 3     Limiting Factors for ADLs: endurance and balance      Exams     ROM:          -       WFL Pt. C Hx of L shoulder dislocation; humeral Fx -Resolved c PT only; no Sx     Hand Dominance: Right    ROM Hand  Left Hand: WFL  Right Hand: WFL    Strength  Overall Strength:          -       WFL          Sensation  Left:          -       WNL  Right:          -       WNL    Coordination:      -       Intact      Visual/Perceptual  Intact  with glasses    Cognition:   WFL    Balance    Sitting  Sitting Surface: EOB  Static: No UE Support, Good (+)  Dynamic: No UE extremity support, Good (-)          Additional Treatments       LifeStyle Change and Education     Patient left up in chair with chair alarm on.Hand off to SU banuelos    Education provided: ADLs, transfer training, body mechanics, assistive device, wheelchair precautions, safety precautions, fall prevention, and equipment recommendations    Multidisciplinary Problems       Occupational Therapy Goals          Problem: Occupational Therapy    Goal Priority Disciplines Outcome Interventions   Occupational Therapy Goal     OT, PT/OT     Description: Pt will perform LB dressing c AE independently  by d/c.  Pt will perform toileting Independently by d/c.  Pt will perform toilet t/f Independently by d/c.    Pt will perform shower t/f c SB assist by d/c.                         Plan     During this hospitalization, patient to be seen 5 x/week to address the identified rehab impairments via self-care/home management, therapeutic activities, therapeutic exercises, wheelchair  management/training and progress toward the following goals:    Plan of Care Expires:  09/06/22    Time Tracking     OT Received On: 08/31/22  Time In 1415     Time Out 1515  Total Time 60 min  Therapy Time: OT Individual: 60  Missed Time:    Missed Time Reason:      Billable Minutes: Evaluation 30 and Self Care/Home Management 30    08/31/2022

## 2022-08-31 NOTE — H&P
[facility] ([facility])  Rehab Admission H&P    Patient Name: Mary Lou Sierra  MRN: [id number]  Age: 71 y.o. Sex: female  : 1950  Hospital Length of Stay: 1 days  Date of Service: 2022   Chief Complaint:  Left hip fracture s/p IMN on 2022    Subjective:   History of Present Illness   71-year-old white female presented to [facility] ED on 2022 complaining of left hip pain after falling down 4 stairs.  PMH significant for HTN, ETOH dependence, nicotine dependence, and anxiety.  Workup significant for left hip fracture.  Tolerated left hip IM nailing on  out perioperative complications.  Tolerated Librium protocol, thiamine and folic acid replacement.  Orthopedic surgery recommended WBAT to LLE and FROM to [facility].  Tolerated transfer to [facility] inpatient rehab unit on  without incident.  Sitting up in bed.  Reports poor sleep overnight secondary to urinary frequency and anxiety.  Last BM .  Appetite is good.  Vital signs at goal.  H&H-trending up.  Albumin 2.6.  Prealbumin 13.1.  Recent imaging reported below.    Past Medical History: Left hip fracture s/p left hip IMN on 2022, HTN, normocytic anemia, anxiety, GERD, ETOH dependence, nicotine dependence  Procedure history:  Tonsillectomy, left IMN on 2022  Family History:  Father:  CAD +CABG + age 69.  Mother:  CAD + at age 93.  Social History:   (+) TOB.  1 PPD   (+) ETOH. 3-4 glasses of liquor daily  (-) Illicit drug use.   Completed high school.  Retired from working with a state.  Retired from clerical work in the hospital.  .  Lives with her sister.  Prior level of function:  Independent ADLs, drives, cooks, does chores, does laundry, grocery shops, manages finances, manages her own medications, and does yd work  Residence:  Lives in a mobile home with 5 steps with bilateral railing.  She uses a walk-in shower with a small threshold.  No grab bar and no hand-held shower.   She has an elevated toilet.  No grab bars adjacent.  DME:  Rolling walker  Anticipated discharge destination:  Home with home health    Review of patient's allergies indicates:  No Known Allergies   No current facility-administered medications for this encounter.    Current Outpatient Medications:     aspirin (ECOTRIN) 81 MG EC tablet, Take 1 tablet (81 mg total) by mouth 2 (two) times a day., Disp: 60 tablet, Rfl: 0    enoxaparin (LOVENOX) 30 mg/0.3 mL Syrg, Inject 0.3 mLs (30 mg total) into the skin every 12 (twelve) hours. for 14 days, Disp: 8.4 mL, Rfl: 0    HYDROcodone-acetaminophen (NORCO) 5-325 mg per tablet, Take 1 tablet by mouth every 4 (four) hours as needed for Pain., Disp: 42 tablet, Rfl: 0    methocarbamoL (ROBAXIN) 500 MG Tab, Take 1 tablet (500 mg total) by mouth 3 (three) times daily as needed (spasm)., Disp: 21 tablet, Rfl: 0    Facility-Administered Medications Ordered in Other Encounters:     acetaminophen tablet 650 mg, 650 mg, Oral, Q6H PRN, ALISHA Medrano    ALPRAZolam tablet 1 mg, 1 mg, Oral, Nightly PRN, Usman Urbina MD, 1 mg at 08/30/22 2130    aluminum-magnesium hydroxide-simethicone 200-200-20 mg/5 mL suspension 30 mL, 30 mL, Oral, Q6H PRN, ALISHA Medrano    bisacodyL suppository 10 mg, 10 mg, Rectal, Daily PRN, ALISHA Medrano    docusate sodium capsule 100 mg, 100 mg, Oral, BID, ALISHA Medrano, 100 mg at 08/31/22 0848    enoxaparin injection 30 mg, 30 mg, Subcutaneous, Q12H, ALISHA Medrano, 30 mg at 08/31/22 0848    EScitalopram oxalate tablet 20 mg, 20 mg, Oral, Daily, Usman Urbina MD, 20 mg at 08/31/22 0848    folic acid tablet 1 mg, 1 mg, Oral, Daily, SHAYNE Hill, 1 mg at 08/31/22 0848    HYDROcodone-acetaminophen 5-325 mg per tablet 1 tablet, 1 tablet, Oral, Q4H PRN, ALISHA Medrano, 1 tablet at 08/31/22 0848    HYDROmorphone (PF) injection 1 mg, 1 mg, Intravenous, Q4H PRN, Leonidas Hadley III, MD    lorazepam injection 1 mg, 1  "mg, Intravenous, PRN, SHAYNE Hill    melatonin tablet 6 mg, 6 mg, Oral, Nightly PRN, Leonidas Hadley III, MD    methocarbamoL tablet 500 mg, 500 mg, Oral, TID PRN, ALISHA Medrano, 500 mg at 08/30/22 2130    morphine injection 2 mg, 2 mg, Intravenous, Q4H PRN, ALISHA Medrano    multivitamin tablet, 1 tablet, Oral, Daily, SHAYNE Hill, 1 tablet at 08/31/22 0848    ondansetron 4 mg/5 mL solution 4 mg, 4 mg, Oral, Q6H PRN, ALISHA Medrano    ondansetron injection 4 mg, 4 mg, Intravenous, Q8H PRN, Leonidas Hadley III, MD    pantoprazole EC tablet 40 mg, 40 mg, Oral, Daily, ALISHA Medrano, 40 mg at 08/31/22 0848    polyethylene glycol packet 17 g, 17 g, Oral, Daily PRN, ALISHA Medrano, 17 g at 08/27/22 2008    senna-docusate 8.6-50 mg per tablet 1 tablet, 1 tablet, Oral, BID, Leonidas Hadley III, MD, 1 tablet at 08/31/22 0848    sodium chloride 0.9% flush 10 mL, 10 mL, Intravenous, PRN, Leonidas Hadley III, MD    thiamine tablet 100 mg, 100 mg, Oral, Daily, SHAYNE Hill, 100 mg at 08/31/22 0848     Review of Systems   Complete 12-point review of symptoms negative except for what's mentioned in HPI     Objective:     /70   Pulse 64   Temp 98 °F (36.7 °C) (Oral)   Resp 14   Ht 5' 2.5" (1.588 m)   Wt 72.3 kg (159 lb 6.3 oz)   SpO2 95%   BMI 28.69 kg/m²       Intake/Output Summary (Last 24 hours) at 9/1/2022 0935  Last data filed at 8/31/2022 1800  Gross per 24 hour   Intake 240 ml   Output --   Net 240 ml     Physical Exam  Constitutional:       Appearance: Normal appearance.   HENT:      Head: Normocephalic.      Mouth/Throat:      Mouth: Mucous membranes are moist.   Eyes:      Pupils: Pupils are equal, round, and reactive to light.   Cardiovascular:      Rate and Rhythm: Normal rate and regular rhythm.      Heart sounds: Normal heart sounds.      Comments: 2/6 murmur LCW, 5th ICS, MCL.  LLE 1+ edema  Pulmonary:      Effort: Pulmonary effort is " normal.      Breath sounds: Normal breath sounds.   Abdominal:      General: Bowel sounds are normal.      Palpations: Abdomen is soft.   Musculoskeletal:      Cervical back: Neck supple.      Comments: Generalized weakness and muscle atrophy.  Left hip dressing clean and intact   Skin:     General: Skin is warm and dry.   Neurological:      General: No focal deficit present.      Mental Status: She is alert and oriented to person, place, and time.   Psychiatric:         Mood and Affect: Mood normal.         Behavior: Behavior normal.         Thought Content: Thought content normal.         Judgment: Judgment normal.   *MD performed and documented physical examination       Lines/Drains/Airways       None                 Labs  Admission on 08/31/2022   Component Date Value Ref Range Status    Sodium Level 09/01/2022 143  136 - 145 mmol/L Final    Potassium Level 09/01/2022 4.1  3.5 - 5.1 mmol/L Final    Chloride 09/01/2022 103  98 - 107 mmol/L Final    Carbon Dioxide 09/01/2022 30  23 - 31 mmol/L Final    Glucose Level 09/01/2022 102  82 - 115 mg/dL Final    Blood Urea Nitrogen 09/01/2022 10.9  9.8 - 20.1 mg/dL Final    Creatinine 09/01/2022 0.65  0.55 - 1.02 mg/dL Final    Calcium Level Total 09/01/2022 9.4  8.4 - 10.2 mg/dL Final    Protein Total 09/01/2022 6.2  5.8 - 7.6 gm/dL Final    Albumin Level 09/01/2022 2.6 (L)  3.4 - 4.8 gm/dL Final    Globulin 09/01/2022 3.6 (H)  2.4 - 3.5 gm/dL Final    Albumin/Globulin Ratio 09/01/2022 0.7 (L)  1.1 - 2.0 ratio Final    Bilirubin Total 09/01/2022 1.1  <=1.5 mg/dL Final    Alkaline Phosphatase 09/01/2022 107  40 - 150 unit/L Final    Alanine Aminotransferase 09/01/2022 30  0 - 55 unit/L Final    Aspartate Aminotransferase 09/01/2022 29  5 - 34 unit/L Final    eGFR 09/01/2022 >60  mls/min/1.73/m2 Final    Magnesium Level 09/01/2022 2.10  1.60 - 2.60 mg/dL Final    Phosphorus Level 09/01/2022 3.4  2.3 - 4.7 mg/dL Final    Prealbumin 09/01/2022 13.1 (L)  14.0 - 37.0 mg/dL  Final    Iron Binding Capacity Unsaturated 09/01/2022 166  70 - 310 ug/dL Final    Iron Level 09/01/2022 60  50 - 170 ug/dL Final    Transferrin 09/01/2022 205  173 - 360 mg/dL Final    Iron Binding Capacity Total 09/01/2022 226 (L)  250 - 450 ug/dL Final    Iron Saturation 09/01/2022 27  20 - 50 % Final    WBC 09/01/2022 9.4  4.5 - 11.5 x10(3)/mcL Final    RBC 09/01/2022 2.99 (L)  4.20 - 5.40 x10(6)/mcL Final    Hgb 09/01/2022 9.6 (L)  12.0 - 16.0 gm/dL Final    Hct 09/01/2022 29.5 (L)  37.0 - 47.0 % Final    MCV 09/01/2022 98.7 (H)  80.0 - 94.0 fL Final    MCH 09/01/2022 32.1 (H)  27.0 - 31.0 pg Final    MCHC 09/01/2022 32.5 (L)  33.0 - 36.0 mg/dL Final    RDW 09/01/2022 14.6  11.5 - 17.0 % Final    Platelet 09/01/2022 362  130 - 400 x10(3)/mcL Final    MPV 09/01/2022 10.0  7.4 - 10.4 fL Final    Neut % 09/01/2022 52.4  % Final    Lymph % 09/01/2022 33.5  % Final    Mono % 09/01/2022 8.0  % Final    Eos % 09/01/2022 4.4  % Final    Basophil % 09/01/2022 0.5  % Final    Lymph # 09/01/2022 3.16  0.6 - 4.6 x10(3)/mcL Final    Neut # 09/01/2022 4.9  2.1 - 9.2 x10(3)/mcL Final    Mono # 09/01/2022 0.75  0.1 - 1.3 x10(3)/mcL Final    Eos # 09/01/2022 0.41  0 - 0.9 x10(3)/mcL Final    Baso # 09/01/2022 0.05  0 - 0.2 x10(3)/mcL Final    IG# 09/01/2022 0.11 (H)  0 - 0.04 x10(3)/mcL Final    IG% 09/01/2022 1.2  % Final    NRBC% 09/01/2022 0.0  % Final        Radiology  Left femur XR two view on 08/24/2022 at 12:41 p.m., IMPRESSION: There has been interval insertion of an intramedullary alva traversing a fracture of the midshaft and distal aspect of the femur position and alignment of the visualized osseous structures is anatomical multiple orthopedic screws identified in the distal femur    Assessment/Plan:     71 y.o. WF admitted on 8/31/2022    Left hip fracture  - s/p left hip IMN on 8/24/2022  - continue    Norco 5 mg/325 mg q.4 hours p.r.n.     Lovenox 30 mg q.12 hours      Robaxin 500 mg t.i.d.     Multivitamin daily      Tylenol 650 mg q.6 hours  - Consult physiatry for rehab and pain management  - PT/OT/RT/ST to eval and treat     HTN  - at goal!!  - continue   Hydralazine 10 mg every 2 hours as needed for BP > 160/90   Labetalol 10 mg every 2 hours as needed for BP > 160/90    Normocytic anemia  - asymptomatic  - H/H stable   - no evidence of active bleeds  - will closely monitor and transfuse if needed     Anxiety  - stable   - continue    Lexapro 20 mg daily    Xanax 1 mg at bedtime p.r.n.    Insomnia  - poor sleep  - initiate    Seroquel 50 mg x 1 at 2000  - continue     Melatonin 6 mg at bedtime p.r.n.    Constipation  - last BM 8/31  - continue    Colace 100 mg b.i.d.     GERD  - Avoid spicy foods, and nothing to eat or drink within x2 hours of bedtime or laying flat (water is ok)   - Avoid NSAIDs (Advil, ibuprofen, naproxen...) and mcdonnell-2 inhibitors (Mobic, Celebrex)    - continue   Protonix 40 mg daily     ETOH dependence  - stable   - completed Librium protocol  - continue    Folic acid 1 mg daily    Thiamine 100 mg daily    Nicotine dependence   - smoking cessation counseling given on admission  - initiate    Nicotine 21 mg patch q.day (initiated 9/1)    MEDICAL PLAN:  - Admit to [facility]  - Medical reconciliation completed and included in the electronic health record  - Draw admit labs including CBC, CMP, and Prealbumin  - Maintain weightbearing status as ordered  - Monitor postoperative surgical incision changing dressing daily  - Teach skin protection and wound prevention techniques  - Initiate fall precaution  - Initiate bowel training program  - Initiate bladder training as indicated  - Pain management  - IS q2 hours while awake    THERAPEUTIC PLAN:  - Physical therapy 60-90 minutes 5 to week to increase functional mobility, maintain weightbearing precautions, increase lower extremity restrengthening, increase overall activity tolerance, teach balance and safety  measures as well as fall precautions, make equipment and orthotic recommendations, be involved in family training and discharge planning, monitor pain levels and vital signs during therapy adjusting treatment accordingly  - Occupational therapy 60-90 minutes 5 times a week to increase functional independence with activities of daily living, maintain weightbearing precautions, teach use of adaptive equipment, increase upper extremity restrengthening, increase overall activity tolerance, teach balance and safety measures as well as fall precautions, make equipment and orthotic recommendations, be involved in family training and discharge planning, monitor pain levels and vital signs during therapy adjusting treatment accordingly  - Speech and language pathology will do an in-depth evaluation of patient's cognition, phonation, and swallowing. They will initiate therapy 30- 60 minutes 5 times a week as indicated  - Recreational therapy will incorporate all patient's functional abilities  into recreational activities that will require visual, spatial, and perceptual abilities; gross and fine motor coordination skills; the cognition to follow multistep commands; dynamic and static balance and reaching abilities. They will also incorporate animal assisted therapy into their weekly program  - Rehabilitation nursing will act as patient family physician liaison. They will integrate patient's functional abilities, after discussions with therapist, into everyday activities with the CNA's,  LPN's and RNs that will include but are not limited to dressing, bathing, feeding, grooming, toileting, transfers, hygiene etc. They will administer medications watching for wanted as well as unwanted effects. They will initiate DVT/VTE prophylaxis as ordered. Will monitor vital signs, lab values, and pain levels, making appropriate physician notification. They will monitor skin integrity including postop incision, making daily dressing  changes. They will teach skin protection and wound prevention techniques. They will initiate bowel training They will initiate bladder training as indicated. They will initiate fall precautions  - Rehabilitation counseling/case management will act as patient and family liaison. They will set up family conferences, family training, aid in discharge planning, and aid in the procurement of assistive devices  - Patient will have 24 hour availability to physiatric care  - Patient will have nutritional services involved, monitoring oral input and visceral protein stores. They will make dietary and supplement recommendations and follow-up as necessary  - Patient will have weekly interdisciplinary team conferences  - Patient will have initial family conference and follow up conferences as indicated  - Patient will have family training prior to discharge     Estimated length of stay - 14-17 days  Anticipated discharge destination - Home with   Medical status - stabilizing postoperatively; will need to monitor pain levels, postoperative skin integrity, watch for evidence of deep vein thrombosis, monitor postoperative H&H, monitor vital signs closely.  Medical prognosis - Good for post-op healing and functional improvement  Previous functional Status:  Independent  Present Functional Status:  Min assist    VTE Prophylaxis:  Lovenox 30 mg b.i.d.  COVID-19 testing:  Negative on 08/31/2022  COVID-19 vaccination status:  Unvaccinated    POA: Lesly (sister)  Living will: Yes  Contacts: Lesly Sierra (sister) [phone]    CODE STATUS: DNR  Internal Medicine (attending): Luis Singh MD  Physiatry (consulting):  Leo Parada MD    OUTPATIENT PROVIDERS  PCP: ALISHA Asher  Orthopedic surgery:  Silverio Vaughn MD    DISPOSITION: Condition stable. Tolerated transfer.  Recent labs and imaging reviewed.  Rehab admission orders initiated.  Med reconciliation completed.  Consult physiatry for rehab and pain management.   PT/OT/RT/ST to eval and treat.  Sleep hygiene poor secondary to anxiety urinary frequency.  Appetite and bowel maintenance at goal.  Vital signs at goal with no recorded fevers.  H&H trending up.  Initiate Seroquel 50 mg at bedtime x1 tonight.  Offer chocolate boost to increase protein intake for healing.  Monitor closely.  Notify CC of acute changes.    PHYSICIAN ATTESTATION: I have been involved in the patient's rehabilitation prescreening process and I have now completed the post admission physician evaluation. Patient is in need of, appropriate for, and should tolerate the above outlined inpatient rehabilitation plan. I believe this is necessary to reach maximal postoperative healing and maximal functional abilities. I do not believe this would be possible in a timely fashion in a less intensive setting      Iftikhar Escudero NP conducted independent physical examination and assisted with medical documentation.    Total time spent on this encounter including chart review and direct MD + NP 1-on-1 patient interaction: 107 minutes   Over 50% of this time was spent in counseling and coordination of care

## 2022-09-01 LAB
ALBUMIN SERPL-MCNC: 2.6 GM/DL (ref 3.4–4.8)
ALBUMIN/GLOB SERPL: 0.7 RATIO (ref 1.1–2)
ALP SERPL-CCNC: 107 UNIT/L (ref 40–150)
ALT SERPL-CCNC: 30 UNIT/L (ref 0–55)
AST SERPL-CCNC: 29 UNIT/L (ref 5–34)
BASOPHILS # BLD AUTO: 0.05 X10(3)/MCL (ref 0–0.2)
BASOPHILS NFR BLD AUTO: 0.5 %
BILIRUBIN DIRECT+TOT PNL SERPL-MCNC: 1.1 MG/DL
BUN SERPL-MCNC: 10.9 MG/DL (ref 9.8–20.1)
CALCIUM SERPL-MCNC: 9.4 MG/DL (ref 8.4–10.2)
CHLORIDE SERPL-SCNC: 103 MMOL/L (ref 98–107)
CO2 SERPL-SCNC: 30 MMOL/L (ref 23–31)
CREAT SERPL-MCNC: 0.65 MG/DL (ref 0.55–1.02)
EOSINOPHIL # BLD AUTO: 0.41 X10(3)/MCL (ref 0–0.9)
EOSINOPHIL NFR BLD AUTO: 4.4 %
ERYTHROCYTE [DISTWIDTH] IN BLOOD BY AUTOMATED COUNT: 14.6 % (ref 11.5–17)
FERRITIN SERPL-MCNC: 194.44 NG/ML (ref 4.63–204)
GFR SERPLBLD CREATININE-BSD FMLA CKD-EPI: >60 MLS/MIN/1.73/M2
GLOBULIN SER-MCNC: 3.6 GM/DL (ref 2.4–3.5)
GLUCOSE SERPL-MCNC: 102 MG/DL (ref 82–115)
HCT VFR BLD AUTO: 29.5 % (ref 37–47)
HGB BLD-MCNC: 9.6 GM/DL (ref 12–16)
IMM GRANULOCYTES # BLD AUTO: 0.11 X10(3)/MCL (ref 0–0.04)
IMM GRANULOCYTES NFR BLD AUTO: 1.2 %
IRON SATN MFR SERPL: 27 % (ref 20–50)
IRON SERPL-MCNC: 60 UG/DL (ref 50–170)
LYMPHOCYTES # BLD AUTO: 3.16 X10(3)/MCL (ref 0.6–4.6)
LYMPHOCYTES NFR BLD AUTO: 33.5 %
MAGNESIUM SERPL-MCNC: 2.1 MG/DL (ref 1.6–2.6)
MCH RBC QN AUTO: 32.1 PG (ref 27–31)
MCHC RBC AUTO-ENTMCNC: 32.5 MG/DL (ref 33–36)
MCV RBC AUTO: 98.7 FL (ref 80–94)
MONOCYTES # BLD AUTO: 0.75 X10(3)/MCL (ref 0.1–1.3)
MONOCYTES NFR BLD AUTO: 8 %
NEUTROPHILS # BLD AUTO: 4.9 X10(3)/MCL (ref 2.1–9.2)
NEUTROPHILS NFR BLD AUTO: 52.4 %
NRBC BLD AUTO-RTO: 0 %
PHOSPHATE SERPL-MCNC: 3.4 MG/DL (ref 2.3–4.7)
PLATELET # BLD AUTO: 362 X10(3)/MCL (ref 130–400)
PMV BLD AUTO: 10 FL (ref 7.4–10.4)
POTASSIUM SERPL-SCNC: 4.1 MMOL/L (ref 3.5–5.1)
PREALB SERPL-MCNC: 13.1 MG/DL (ref 14–37)
PROT SERPL-MCNC: 6.2 GM/DL (ref 5.8–7.6)
RBC # BLD AUTO: 2.99 X10(6)/MCL (ref 4.2–5.4)
SODIUM SERPL-SCNC: 143 MMOL/L (ref 136–145)
TIBC SERPL-MCNC: 166 UG/DL (ref 70–310)
TIBC SERPL-MCNC: 226 UG/DL (ref 250–450)
TRANSFERRIN SERPL-MCNC: 205 MG/DL (ref 173–360)
WBC # SPEC AUTO: 9.4 X10(3)/MCL (ref 4.5–11.5)

## 2022-09-01 PROCEDURE — 84100 ASSAY OF PHOSPHORUS: CPT | Performed by: NURSE PRACTITIONER

## 2022-09-01 PROCEDURE — 99900035 HC TECH TIME PER 15 MIN (STAT)

## 2022-09-01 PROCEDURE — 36415 COLL VENOUS BLD VENIPUNCTURE: CPT | Performed by: NURSE PRACTITIONER

## 2022-09-01 PROCEDURE — S4991 NICOTINE PATCH NONLEGEND: HCPCS | Performed by: NURSE PRACTITIONER

## 2022-09-01 PROCEDURE — 63600175 PHARM REV CODE 636 W HCPCS: Performed by: NURSE PRACTITIONER

## 2022-09-01 PROCEDURE — 85025 COMPLETE CBC W/AUTO DIFF WBC: CPT | Performed by: NURSE PRACTITIONER

## 2022-09-01 PROCEDURE — 80053 COMPREHEN METABOLIC PANEL: CPT | Performed by: NURSE PRACTITIONER

## 2022-09-01 PROCEDURE — 11800000 HC REHAB PRIVATE ROOM

## 2022-09-01 PROCEDURE — 84134 ASSAY OF PREALBUMIN: CPT | Performed by: NURSE PRACTITIONER

## 2022-09-01 PROCEDURE — 97530 THERAPEUTIC ACTIVITIES: CPT

## 2022-09-01 PROCEDURE — 97535 SELF CARE MNGMENT TRAINING: CPT

## 2022-09-01 PROCEDURE — 94799 UNLISTED PULMONARY SVC/PX: CPT

## 2022-09-01 PROCEDURE — 82728 ASSAY OF FERRITIN: CPT | Performed by: NURSE PRACTITIONER

## 2022-09-01 PROCEDURE — 97110 THERAPEUTIC EXERCISES: CPT

## 2022-09-01 PROCEDURE — 83735 ASSAY OF MAGNESIUM: CPT | Performed by: NURSE PRACTITIONER

## 2022-09-01 PROCEDURE — 83540 ASSAY OF IRON: CPT | Performed by: NURSE PRACTITIONER

## 2022-09-01 PROCEDURE — 25000003 PHARM REV CODE 250: Performed by: NURSE PRACTITIONER

## 2022-09-01 RX ORDER — QUETIAPINE FUMARATE 25 MG/1
50 TABLET, FILM COATED ORAL ONCE
Status: COMPLETED | OUTPATIENT
Start: 2022-09-01 | End: 2022-09-01

## 2022-09-01 RX ORDER — IBUPROFEN 200 MG
1 TABLET ORAL DAILY
Status: DISCONTINUED | OUTPATIENT
Start: 2022-09-01 | End: 2022-09-09 | Stop reason: HOSPADM

## 2022-09-01 RX ADMIN — ESCITALOPRAM OXALATE 20 MG: 20 TABLET ORAL at 08:09

## 2022-09-01 RX ADMIN — DOCUSATE SODIUM 100 MG: 100 CAPSULE, LIQUID FILLED ORAL at 08:09

## 2022-09-01 RX ADMIN — HYDROCODONE BITARTRATE AND ACETAMINOPHEN 1 TABLET: 5; 325 TABLET ORAL at 03:09

## 2022-09-01 RX ADMIN — FOLIC ACID 1 MG: 1 TABLET ORAL at 08:09

## 2022-09-01 RX ADMIN — METHOCARBAMOL 500 MG: 500 TABLET ORAL at 01:09

## 2022-09-01 RX ADMIN — ACETAMINOPHEN 325MG 650 MG: 325 TABLET ORAL at 05:09

## 2022-09-01 RX ADMIN — HYDROCODONE BITARTRATE AND ACETAMINOPHEN 1 TABLET: 5; 325 TABLET ORAL at 01:09

## 2022-09-01 RX ADMIN — HYDROCODONE BITARTRATE AND ACETAMINOPHEN 1 TABLET: 5; 325 TABLET ORAL at 08:09

## 2022-09-01 RX ADMIN — PANTOPRAZOLE SODIUM 40 MG: 40 TABLET, DELAYED RELEASE ORAL at 08:09

## 2022-09-01 RX ADMIN — MULTIPLE VITAMINS W/ MINERALS TAB 1 TABLET: TAB at 08:09

## 2022-09-01 RX ADMIN — HYDROCODONE BITARTRATE AND ACETAMINOPHEN 1 TABLET: 5; 325 TABLET ORAL at 05:09

## 2022-09-01 RX ADMIN — QUETIAPINE 50 MG: 25 TABLET ORAL at 08:09

## 2022-09-01 RX ADMIN — NICOTINE 1 PATCH: 21 PATCH, EXTENDED RELEASE TRANSDERMAL at 01:09

## 2022-09-01 RX ADMIN — ACETAMINOPHEN 325MG 650 MG: 325 TABLET ORAL at 01:09

## 2022-09-01 RX ADMIN — Medication 100 MG: at 08:09

## 2022-09-01 RX ADMIN — METHOCARBAMOL 500 MG: 500 TABLET ORAL at 05:09

## 2022-09-01 RX ADMIN — ENOXAPARIN SODIUM 30 MG: 30 INJECTION SUBCUTANEOUS at 08:09

## 2022-09-01 RX ADMIN — METHOCARBAMOL 500 MG: 500 TABLET ORAL at 08:09

## 2022-09-01 NOTE — PT/OT/SLP PROGRESS
Physical Therapy Inpatient Rehab Treatment    Patient Name:  Mary Lou Sierra   MRN:  [id number]    Recommendations:     Discharge Recommendations:  home health PT   Discharge Equipment Recommendations: wheelchair   Barriers to discharge: Decreased caregiver support    Assessment:     Mary Lou Sierra is a 71 y.o. female admitted with a medical diagnosis of S/p left hip fracture.  She presents with the following impairments/functional limitations:  weakness, impaired endurance, impaired self care skills, impaired functional mobility, impaired balance, decreased lower extremity function, pain, decreased ROM, orthopedic precautions .    Rehab Diagnosis:     Recent Surgery: * No surgery found *      General Precautions: Standard, fall     Orthopedic Precautions:LLE weight bearing as tolerated (transfers only)     Braces: N/A    Rehab Prognosis: Good; patient would benefit from acute skilled PT services to address these deficits and reach maximum level of function.      History:     Past Medical History:   Diagnosis Date    Hypertension        No past surgical history on file.    Subjective     Chief Complaint: N/A  Patient/Family Comments/goals: N/A    Vitals   Vitals at Rest  BP    HR    O2 Sat    Pain 4/10 L leg leg pain Donned ice        Respiratory Status: Room air    Patients cultural, spiritual, Moravian conflicts given the current situation: no      Objective:     Communicated with RN prior to session.  Patient found up in chair with peripheral IV  upon PT entry to room.    Pt is Oriented x3 and  Slightly emotional at times, Alert, Cooperative, and Motivated.    Functional Mobility:      Current   Status   Discharge   Goal   Functional Area: Care Score:     Roll Left and Right 4  Independent   Sit to Lying 4  Independent   Lying to Sitting on Side of Bed 3 Assist with LLE 2/2 muscle spasm and pain  Independent   Sit to Stand 4  Independent   Chair/Bed-to-Chair Transfer 4 Stand pivot t/f  Independent   Car  Transfer    Independent   Walk 10 Feet    Not attempted due to medical/safety concerns   Walk 50 Feet with Two Turns    Not attempted due to medical/safety concerns   Walk 150 Feet    Not attempted due to medical/safety concerns   Walk 10 Feet Uneven Surface    Not attempted due to medical/safety concerns   1 Step (Curb)    Not attempted due to medical/safety concerns   4 Steps    Not attempted due to medical/safety concerns   12 Steps    Not attempted due to medical/safety concerns   Picking Up Object    Set-up/clean-up   Wheel 50 Feet with Two Turns 3  Independent   Wheel 150 Feet 3 250' BUE propulsion with RLE to assist with steering. Occasional min A with steering Independent       Therapeutic Activities and Exercises:    Supine TherX: 10 x 3 LLE; AAROM at times. Rest breaks between bouts.  Quad set  Glute set  TKE on bolster  Hip and knee flexion  Hip abd/add       Activity Tolerance Fair     Patient left up in chair with all lines intact, call button in reach, and chair alarm on.    Education provided: roles and goals of PT/PTA, transfer training, bed mob, wheelchair management, strengthening exercises, and fall prevention    Expected compliance:    GOALS:   Multidisciplinary Problems       Physical Therapy Goals          Problem: Physical Therapy    Goal Priority Disciplines Outcome Goal Variances Interventions   Physical Therapy Goal     PT, PT/OT Ongoing, Progressing     Description: Short Term goals      Bed Mobility   Roll Right and Left Setup or Clean-up Assistance .  Lying to sitting Setup or Clean-up Assistance .  Sitting to lying Setup or Clean-up Assistance .    Transfers    Pt will be able to perform Stand pivot  chair/bed to chair transfer With RW Setup or Clean-up Assistance .  Pt will be able to perform Sit to stand with RW Setup or Clean-up Assistance .  Pt will be able to perform Car transfer with RW Setup or Clean-up Assistance .    Wheelchair Mobility   Pt will be able to propel 500 feet in  rubens wheelchair Independent.      Timeframe: By Re-eval                         Plan:     During this hospitalization, patient to be seen 5 x/week to address the identified rehab impairments via therapeutic activities, therapeutic exercises, wheelchair management/training and progress toward the following goals:    Plan of Care Expires:  09/06/22    Additional Information:         Time Tracking:     PT Received On: 09/01/22  Time In 1000     Time Out 1130  Total Time 90 min  Therapy Time PT Individual: 90  Missed Time:    Time Missed due to:      Billable Minutes: Therapeutic Activity 45 and Therapeutic Exercise 45    09/01/2022

## 2022-09-01 NOTE — PT/OT/SLP PROGRESS
Physical Therapy Inpatient Rehab Treatment    Patient Name:  Mary Lou Sierra   MRN:  [id number]    Recommendations:     Discharge Recommendations:  home health PT   Discharge Equipment Recommendations: wheelchair   Barriers to discharge: Decreased caregiver support    Assessment:     Mary Lou Sierra is a 71 y.o. female admitted with a medical diagnosis of S/p left hip fracture.  She presents with the following impairments/functional limitations:  weakness, impaired endurance, impaired self care skills, impaired functional mobility, impaired balance, decreased lower extremity function, pain, decreased ROM, orthopedic precautions .    Rehab Diagnosis:     Recent Surgery: * No surgery found *      General Precautions: Standard, fall     Orthopedic Precautions:LLE weight bearing as tolerated (transfers only)     Braces: N/A    Rehab Prognosis: Good; patient would benefit from acute skilled PT services to address these deficits and reach maximum level of function.      History:     Past Medical History:   Diagnosis Date    Hypertension        No past surgical history on file.    Subjective     Chief Complaint: L knee spasms  Patient/Family Comments/goals:     Vitals   Vitals at Rest  BP    HR    O2 Sat    Pain 4/10 L leg      Vitals With Activity  BP    HR    O2 Sat    Pain      Respiratory Status: Room air    Patients cultural, spiritual, Sikhism conflicts given the current situation: no      Objective:     Communicated with RN prior to session.  Patient found up in chair with peripheral IV  upon PT entry to room.    Pt is Oriented x3 and Alert, Cooperative, and Motivated.    Functional Mobility:        Current   Status   Discharge   Goal   Functional Area: Care Score:     Roll Left and Right   Independent   Sit to Lying   Independent   Lying to Sitting on Side of Bed   Independent   Sit to Stand 4  Independent   Chair/Bed-to-Chair Transfer 4 With RW w/c to w/c (into better fit W/C) and again w/c to recliner.  Stand pivot t/f with RW. Increased time and increased pain noted Independent   Car Transfer    Independent   Walk 10 Feet    Not attempted due to medical/safety concerns   Walk 50 Feet with Two Turns    Not attempted due to medical/safety concerns   Walk 150 Feet    Not attempted due to medical/safety concerns   Walk 10 Feet Uneven Surface    Not attempted due to medical/safety concerns   1 Step (Curb)    Not attempted due to medical/safety concerns   4 Steps    Not attempted due to medical/safety concerns   12 Steps    Not attempted due to medical/safety concerns   Picking Up Object    Set-up/clean-up   Wheel 50 Feet with Two Turns 4  Independent   Wheel 150 Feet 4 300' BUE propulsion. VC for proper tech and set up of leg rests and breaks.  Independent     Activity Tolerance Good     Patient left up in chair with all lines intact, call button in reach, and chair alarm on.    Education provided: roles and goals of PT/PTA, transfer training, and wheelchair management    Expected compliance:    GOALS:   Multidisciplinary Problems       Physical Therapy Goals          Problem: Physical Therapy    Goal Priority Disciplines Outcome Goal Variances Interventions   Physical Therapy Goal     PT, PT/OT Ongoing, Progressing     Description: Short Term goals      Bed Mobility   Roll Right and Left Setup or Clean-up Assistance .  Lying to sitting Setup or Clean-up Assistance .  Sitting to lying Setup or Clean-up Assistance .    Transfers    Pt will be able to perform Stand pivot  chair/bed to chair transfer With RW Setup or Clean-up Assistance .  Pt will be able to perform Sit to stand with RW Setup or Clean-up Assistance .  Pt will be able to perform Car transfer with RW Setup or Clean-up Assistance .    Wheelchair Mobility   Pt will be able to propel 500 feet in rubens wheelchair Independent.      Timeframe: By Re-eval                         Plan:     During this hospitalization, patient to be seen 5 x/week to address the  identified rehab impairments via therapeutic activities, therapeutic exercises, wheelchair management/training and progress toward the following goals:    Plan of Care Expires:  09/06/22    Additional Information:         Time Tracking:     PT Received On: 09/01/22  Time In 1300     Time Out 1330  Total Time 30 min  Therapy Time PT Individual: 30  Missed Time:    Time Missed due to:      Billable Minutes: Therapeutic Activity 30    09/01/2022

## 2022-09-01 NOTE — CONSULTS
"Inpatient Nutrition Assessment    Admit Date: 8/31/2022   Length of Stay: 1 days  Nutrition Recommendation/Prescription     Continue regular diet as tolerated     Chocolate Boost Original (240 kcal, 10 g pro/svg)        Nutrition Assessment       Chart Review    Reason Seen: Rehab consult    Diagnosis: Left hip fracture s/p IMN on 08/24/2022    Relevant Medical History:     Nutrition-Related Medications: Folic acid, MVI, thiamine, pantoprazole, colace  Calorie Containing IV Medications: no significant kcals from medications at this time    Nutrition-Related Labs: 9/1: H/H 9.6/29.5(L), Alb 2.6(L), GFR >60, PAB 13.1,       Diet/PN Order: Diet Adult Regular  Oral Supplement Order: Boost  Tube Feeding Order: none at this time  Appetite/Oral Intake: good/50-75% of meals  Factors Affecting Nutritional Intake:  smell of food on tray makes her nauseous   Food/Taoist/Cultural Preferences:  No milk, No tea (sub water) - Updated codex in kitchen, [place] Dressing   Skin Integrity: incision  Wound(s):   None documented at this time     Comments    9/1: Pt reports good appetite at breakfast but poor appetite at lunch and at dinner last night. Std smell of food makes her nauseous - obtained menu from kitchen and reviewed with pt. Encouraged pt to order sandwich if does not like food on tray. Encouraged adequate nutrition for healing and intensive rehab therapy. Drinking Boost as receives. For some reason is getting Boost GC-not on order vs Original Boost-ordered. Pt does not like. Clarified with kitchen. Std having trouble sleeping last night d/t had 2-3 cups of coffee before bed and having to urinate overnight. Std also may be d/t anxiety d/t unable to smoke while in hospital. Noted-nicotine patch per MAR. Denies any recent wt loss. No wt loss noted per chart review.     Anthropometrics    Height: 5' 2.5" (158.8 cm)    Weight: 72.3 kg (159 lb 6.3 oz) (08/31/22 1530) Weight Method: Standard Scale  BMI (Calculated): " 28.7  BMI Classification: overweight (BMI 25-29.9)  Ideal Body Weight (IBW), Female: 112.5 lb  % Ideal Body Weight, Female (lb): 141.68 %  Usual Weight 159 lbs   Usual Weight Provided By: patient    Wt Readings from Last 5 Encounters:   08/31/22 72.3 kg (159 lb 6.3 oz)   08/23/22 72.6 kg (160 lb)     Weight Change(s) Since Admission:  Admit Weight: 72.3 kg (159 lb 6.3 oz) (08/31/22 1530)    Estimated Needs    Weight Used For Calorie Calculations: 72.3 kg (159 lb 6.3 oz)  Energy Calorie Requirements (kcal): 1552  Energy Need Method: [place]-St Jeor (x 1.3 SF)  Weight Used For Protein Calculations: 72.3 kg (159 lb 6.3 oz)  Protein Requirements: 94 kg (1.2 g/kg)  Fluid Requirements (mL): 1552      Evaluation of Received Nutrient Intake    Calories: not meeting estimated needs  Protein: not meeting estimated needs    Patient Education    Not applicable.    Nutrition Diagnosis     PES: Inadequate energy intake r/t food not palatable AEB reports does not like food, eating <25% x 2 meals   Interventions/Goals     Intervention(s): general/healthful diet and commercial beverage  Goal: Consume % of meals/snacks by follow-up. (new)    Monitoring & Evaluation     Dietitian will monitor food and beverage intake, energy intake, weight, weight change, electrolyte and renal panel, glucose/endocrine profile, and gastrointestinal profile.  Nutrition Risk/Follow-Up: low (follow-up in 5-7 days)

## 2022-09-01 NOTE — PT/OT/SLP PROGRESS
"[facility]    Name: Mary Lou Sierra  MRN: [id number]    Assessment:  Mary Lou Sierra is a 71 y.o. female admitted with a medical diagnosis of S/p left hip fracture.  She presents with the following impairments/functional limitations:  weakness, impaired endurance, impaired self care skills, impaired functional mobility, gait instability, impaired balance, decreased coordination, decreased upper extremity function, decreased safety awareness, pain, orthopedic precautions  .    General Precautions: Standard, fall     Orthopedic Precautions:LLE weight bearing as tolerated (transfers only)     Braces: N/A    Rehab Prognosis: Good; patient would benefit from acute skilled OT services to address these deficits and reach maximum level of function.      History:     Past Medical History:   Diagnosis Date    Hypertension        No past surgical history on file.    Subjective     Orientation: Oriented x4    Chief Complaint: spasms on inner L thigh    Patient/Family Comments/goals: "to be able to get rid of these spasms"    Vitals   Vitals at Rest  Pain 7/10     Vitals With Activity  Pain 7/10     Respiratory Status: Room air    Patients cultural, spiritual, Bahai conflicts given the current situation: no       Objective:     Patient found up in chair with    upon OT entry to room.    Mobility   Patient completed:  Sit to Stand Transfer with supervision with rolling walker  Stand to Sit Transfer with supervision with rolling walker  Toilet Transfer Squat Pivot and Step Transfer technique with supervision with  rolling walker  Tub Transfer Stand Pivot and Step Transfer technique with minimum assistance with rolling walker assist needed to lift LLE over tubs edge.    Functional Mobility  In room mobility for ADLs.    ADLs   Current Status   Oral Hygiene 6   Shower, Bathe Self 4   Upper Body Dressing 5   Lower Body Dressing 4   Toileting Hygiene 6   Toilet Transfer 4   Putting On, " Taking Off Footwear 4     Limiting Factors for ADLs: motor, endurance, limited ROM, balance, weakness, coordination, and safety awareness     Patient left up in chair with chair alarm on.     Education provided: Roles and goals of OT, ADLs, transfer training, assistive device, safety precautions, fall prevention, and home safety    Multidisciplinary Problems       Occupational Therapy Goals          Problem: Occupational Therapy    Goal Priority Disciplines Outcome Interventions   Occupational Therapy Goal     OT, PT/OT Ongoing, Progressing    Description: Pt will perform LB dressing c AE independently  by d/c.  Pt will perform toileting Independently by d/c.  Pt will perform toilet t/f Independently by d/c.    Pt will perform shower t/f c SB assist by d/c.                         Time Tracking     OT Received On: 09/01/22  Time In 0800     Time Out 0900  Total Time 60 min  Therapy Time: OT Individual: 60  Missed Time:    Missed Time Reason:      Billable Minutes: Self Care/Home Management 60    09/01/2022

## 2022-09-02 PROCEDURE — 94799 UNLISTED PULMONARY SVC/PX: CPT

## 2022-09-02 PROCEDURE — 11800000 HC REHAB PRIVATE ROOM

## 2022-09-02 PROCEDURE — 97110 THERAPEUTIC EXERCISES: CPT

## 2022-09-02 PROCEDURE — 63600175 PHARM REV CODE 636 W HCPCS: Performed by: NURSE PRACTITIONER

## 2022-09-02 PROCEDURE — S4991 NICOTINE PATCH NONLEGEND: HCPCS | Performed by: NURSE PRACTITIONER

## 2022-09-02 PROCEDURE — 97535 SELF CARE MNGMENT TRAINING: CPT

## 2022-09-02 PROCEDURE — 25000003 PHARM REV CODE 250: Performed by: NURSE PRACTITIONER

## 2022-09-02 PROCEDURE — 97530 THERAPEUTIC ACTIVITIES: CPT

## 2022-09-02 RX ORDER — AMLODIPINE BESYLATE 5 MG/1
5 TABLET ORAL DAILY
Status: DISCONTINUED | OUTPATIENT
Start: 2022-09-02 | End: 2022-09-06

## 2022-09-02 RX ADMIN — HYDROCODONE BITARTRATE AND ACETAMINOPHEN 1 TABLET: 5; 325 TABLET ORAL at 02:09

## 2022-09-02 RX ADMIN — ENOXAPARIN SODIUM 30 MG: 30 INJECTION SUBCUTANEOUS at 08:09

## 2022-09-02 RX ADMIN — HYDROCODONE BITARTRATE AND ACETAMINOPHEN 1 TABLET: 5; 325 TABLET ORAL at 06:09

## 2022-09-02 RX ADMIN — DOCUSATE SODIUM 100 MG: 100 CAPSULE, LIQUID FILLED ORAL at 08:09

## 2022-09-02 RX ADMIN — FOLIC ACID 1 MG: 1 TABLET ORAL at 08:09

## 2022-09-02 RX ADMIN — BISACODYL 10 MG: 10 SUPPOSITORY RECTAL at 03:09

## 2022-09-02 RX ADMIN — AMLODIPINE BESYLATE 5 MG: 5 TABLET ORAL at 08:09

## 2022-09-02 RX ADMIN — PANTOPRAZOLE SODIUM 40 MG: 40 TABLET, DELAYED RELEASE ORAL at 08:09

## 2022-09-02 RX ADMIN — Medication 100 MG: at 08:09

## 2022-09-02 RX ADMIN — METHOCARBAMOL 500 MG: 500 TABLET ORAL at 01:09

## 2022-09-02 RX ADMIN — METHOCARBAMOL 500 MG: 500 TABLET ORAL at 08:09

## 2022-09-02 RX ADMIN — HYDROCODONE BITARTRATE AND ACETAMINOPHEN 1 TABLET: 5; 325 TABLET ORAL at 11:09

## 2022-09-02 RX ADMIN — ACETAMINOPHEN 325MG 650 MG: 325 TABLET ORAL at 05:09

## 2022-09-02 RX ADMIN — MULTIPLE VITAMINS W/ MINERALS TAB 1 TABLET: TAB at 08:09

## 2022-09-02 RX ADMIN — NICOTINE 1 PATCH: 21 PATCH, EXTENDED RELEASE TRANSDERMAL at 08:09

## 2022-09-02 RX ADMIN — METHOCARBAMOL 500 MG: 500 TABLET ORAL at 05:09

## 2022-09-02 RX ADMIN — ESCITALOPRAM OXALATE 20 MG: 20 TABLET ORAL at 08:09

## 2022-09-02 RX ADMIN — ACETAMINOPHEN 325MG 650 MG: 325 TABLET ORAL at 12:09

## 2022-09-02 NOTE — PLAN OF CARE
Problem: Rehabilitation ([facility]) Plan of Care  Goal: Plan of Care Review  Outcome: Ongoing, Progressing  Goal: Patient-Specific Goal (Individualized)  Outcome: Ongoing, Progressing  Goal: Absence of New-Onset Illness or Injury  Outcome: Ongoing, Progressing  Goal: Optimal Comfort and Wellbeing  Outcome: Ongoing, Progressing  Goal: Readiness for Transition of Care  Outcome: Ongoing, Progressing     Problem: Skin Injury Risk Increased  Goal: Skin Health and Integrity  Outcome: Ongoing, Progressing     Problem: Fall Injury Risk  Goal: Absence of Fall and Fall-Related Injury  Outcome: Ongoing, Progressing  Intervention: Promote Injury-Free Environment  Flowsheets (Taken 9/2/2022 9598)  Safety Promotion/Fall Prevention:   assistive device/personal item within reach   side rails raised x 2   Fall Risk reviewed with patient/family   commode/urinal/bedpan at bedside   nonskid shoes/socks when out of bed   medications reviewed

## 2022-09-02 NOTE — PT/OT/SLP PROGRESS
Physical Therapy Inpatient Rehab Treatment    Patient Name:  Mary Lou Sierra   MRN:  [id number]    Recommendations:     Discharge Recommendations:  home health PT   Discharge Equipment Recommendations: wheelchair   Barriers to discharge: Decreased caregiver support    Assessment:     Mary Lou Sierra is a 71 y.o. female admitted with a medical diagnosis of S/p left hip fracture.  She presents with the following impairments/functional limitations:  weakness, impaired endurance, impaired self care skills, impaired functional mobility, impaired balance, decreased lower extremity function, pain, decreased ROM, orthopedic precautions .    Rehab Diagnosis:     Recent Surgery: * No surgery found *      General Precautions: Standard, fall     Orthopedic Precautions:LLE weight bearing as tolerated (transfers only)     Braces: N/A    Rehab Prognosis: Good; patient would benefit from acute skilled PT services to address these deficits and reach maximum level of function.      History:     Past Medical History:   Diagnosis Date    Hypertension        No past surgical history on file.    Subjective     Chief Complaint: N/A  Patient/Family Comments/goals: I slept good last night     Vitals   Vitals at Rest  BP    HR    O2 Sat    Pain 2/10 L leg      Vitals With Activity  BP    HR    O2 Sat    Pain      Respiratory Status: Room air    Patients cultural, spiritual, Pentecostalism conflicts given the current situation: no      Objective:     Communicated with RN prior to session.  Patient found up in chair with peripheral IV  upon PT entry to room.    Pt is Oriented x3 and Alert, Cooperative, and Motivated.    Functional Mobility:        Current   Status   Discharge   Goal   Functional Area: Care Score:     Roll Left and Right    Independent   Sit to Lying 4 Increased time to complete  Independent   Lying to Sitting on Side of Bed 4  Independent   Sit to Stand 4 With RW  Independent   Chair/Bed-to-Chair Transfer 4 CGA with VC and and  occasional assist with removing leg rests. W/c<>mat. Working on pt proper set up, removal of leg rests, and opening and closing RW.  Independent   Car Transfer    Independent   Walk 10 Feet    Not attempted due to medical/safety concerns   Walk 50 Feet with Two Turns    Not attempted due to medical/safety concerns   Walk 150 Feet    Not attempted due to medical/safety concerns   Walk 10 Feet Uneven Surface    Not attempted due to medical/safety concerns   1 Step (Curb)    Not attempted due to medical/safety concerns   4 Steps    Not attempted due to medical/safety concerns   12 Steps    Not attempted due to medical/safety concerns   Picking Up Object    Set-up/clean-up   Wheel 50 Feet with Two Turns 4 200' x 2 BUE propulsion  Independent   Wheel 150 Feet 4  Independent       Therapeutic Activities and Exercises:    Supine TherX: 10 x 2 LLE, rest breaks between bouts. Pt reports decreased tightness following tx   Quad set  Glute set  TKE on bolster  Hip and knee flexion  Hip abd/add       Activity Tolerance good    Patient left up in chair with all lines intact, call button in reach, and chair alarm on.    Education provided: roles and goals of PT/PTA, transfer training, bed mob, and wheelchair management    Expected compliance:    GOALS:   Multidisciplinary Problems       Physical Therapy Goals          Problem: Physical Therapy    Goal Priority Disciplines Outcome Goal Variances Interventions   Physical Therapy Goal     PT, PT/OT Ongoing, Progressing     Description: Short Term goals      Bed Mobility   Roll Right and Left Setup or Clean-up Assistance .  Lying to sitting Setup or Clean-up Assistance .  Sitting to lying Setup or Clean-up Assistance .    Transfers    Pt will be able to perform Stand pivot  chair/bed to chair transfer With RW Setup or Clean-up Assistance .  Pt will be able to perform Sit to stand with RW Setup or Clean-up Assistance .  Pt will be able to perform Car transfer with RW Setup or Clean-up  Assistance .    Wheelchair Mobility   Pt will be able to propel 500 feet in rubens wheelchair Independent.      Timeframe: By Re-eval                         Plan:     During this hospitalization, patient to be seen 5 x/week to address the identified rehab impairments via therapeutic activities, therapeutic exercises, wheelchair management/training and progress toward the following goals:    Plan of Care Expires:  09/06/22    Additional Information:     Pt donned shoe with reacher and shoe horn with set up assist.     Time Tracking:     PT Received On: 09/02/22  Time In 0830     Time Out 0930  Total Time 60 min  Therapy Time PT Individual: 60  Missed Time:    Time Missed due to:      Billable Minutes: Therapeutic Activity 45 and Therapeutic Exercise 15    09/02/2022

## 2022-09-02 NOTE — PROGRESS NOTES
[facility] ([facility])  Rehab Progress Note    Patient Name: Mary Lou Sierra  MRN: [id number]  Age: 71 y.o. Sex: female  : 1950  Hospital Length of Stay: 2 days  Date of Service: 2022   Chief Complaint: Left hip fracture s/p IMN on 2022    Subjective:     Basic Information  Admit Information: 71-year-old white female presented to [facility] ED on 2022 complaining of left hip pain after falling down 4 stairs.  PMH significant for HTN, ETOH dependence, nicotine dependence, and anxiety.  Workup significant for left hip fracture.  Tolerated left hip IM nailing on  out perioperative complications.  Tolerated Librium protocol, thiamine and folic acid replacement.  Orthopedic surgery recommended WBAT to E and FROM to [facility].  Tolerated transfer to [facility] inpatient rehab unit on  without incident.  Today's Information: No acute events overnight.  Sitting in a chair.  Reports good sleep and appetite.  Last BM reported .  BP moderately controlled.  No labs or imaging today.    Review of patient's allergies indicates:  No Known Allergies     Current Facility-Administered Medications:     acetaminophen tablet 650 mg, 650 mg, Oral, Q6H, Timo A Kena, FNP, 650 mg at 22 0553    ALPRAZolam tablet 1 mg, 1 mg, Oral, Nightly PRN, Timo A Kena, FNP, 1 mg at 22    amLODIPine tablet 5 mg, 5 mg, Oral, Daily, Timo A Kena, FNP    benzonatate capsule 100 mg, 100 mg, Oral, TID PRN, Timo A Kena, FNP    bisacodyL suppository 10 mg, 10 mg, Rectal, Daily PRN, Timo A Kena, FNP    docusate sodium capsule 100 mg, 100 mg, Oral, BID, Timo A Kena, FNP, 100 mg at 22    enoxaparin injection 30 mg, 30 mg, Subcutaneous, Q12H, Timo A Kena, FNP, 30 mg at 22    EScitalopram oxalate tablet 20 mg, 20 mg, Oral, Daily, Timo A Kena, FNP, 20 mg at 22 0832    folic acid tablet 1 mg, 1 mg, Oral,  "Daily, Timo A Kena, FNP, 1 mg at 09/01/22 0832    hydrALAZINE injection 10 mg, 10 mg, Intravenous, Q4H PRN, Timo A Kena, FNP    HYDROcodone-acetaminophen 5-325 mg per tablet 1 tablet, 1 tablet, Oral, Q4H PRN, Timo A Kena, FNP, 1 tablet at 09/02/22 0214    hydrOXYzine pamoate capsule 50 mg, 50 mg, Oral, Nightly PRN, Timo A Kena, FNP    labetalol 20 mg/4 mL (5 mg/mL) IV syring, 10 mg, Intravenous, Q4H PRN, Timo A Kena, FNP    melatonin tablet 6 mg, 6 mg, Oral, Nightly PRN, Timo A Kena, FNP    methocarbamoL tablet 500 mg, 500 mg, Oral, TID, Timo A Kena, FNP, 500 mg at 09/02/22 0553    metoprolol injection 10 mg, 10 mg, Intravenous, Q2H PRN, Timo A Kena, FNP    multivitamin tablet, 1 tablet, Oral, Daily, Timo A Kena, FNP, 1 tablet at 09/01/22 0831    nicotine 21 mg/24 hr 1 patch, 1 patch, Transdermal, Daily, Timo A Kena, FNP, 1 patch at 09/01/22 1328    nitroGLYCERIN SL tablet 0.4 mg, 0.4 mg, Sublingual, Q5 Min PRN, Timo A Kena, FNP    ondansetron disintegrating tablet 4 mg, 4 mg, Oral, Q6H PRN, Timo A Kena, FNP    pantoprazole EC tablet 40 mg, 40 mg, Oral, Daily, Timo A Kena, FNP, 40 mg at 09/01/22 0832    polyethylene glycol packet 17 g, 17 g, Oral, BID PRN, Timo A Kena, FNP    promethazine tablet 25 mg, 25 mg, Oral, Q6H PRN, Timo A Kena, FNP    thiamine tablet 100 mg, 100 mg, Oral, Daily, Timo A Kena, FNP, 100 mg at 09/01/22 0832     Review of Systems   Complete 12-point review of symptoms negative except for what's mentioned in HPI     Objective:     BP (!) 155/77   Pulse 67   Temp 98.2 °F (36.8 °C) (Oral)   Resp 16   Ht 5' 2.5" (1.588 m)   Wt 72.3 kg (159 lb 6.3 oz)   SpO2 98%   BMI 28.69 kg/m²        Intake/Output Summary (Last 24 hours) at 9/2/2022 0807  Last data filed at 9/1/2022 1900  Gross per 24 hour   Intake 840 ml   Output --   Net 840 ml       Physical " Exam  Constitutional:       Appearance: Normal appearance.   HENT:      Head: Normocephalic.      Mouth/Throat:      Mouth: Mucous membranes are moist.   Eyes:      Pupils: Pupils are equal, round, and reactive to light.   Cardiovascular:      Rate and Rhythm: Normal rate and regular rhythm.      Heart sounds: Normal heart sounds.      Comments: 2/6 murmur LCW, 5th ICS, MCL.  LLE 1+ edema  Pulmonary:      Effort: Pulmonary effort is normal.      Breath sounds: Normal breath sounds.   Abdominal:      General: Bowel sounds are normal.      Palpations: Abdomen is soft.   Musculoskeletal:      Cervical back: Neck supple.      Comments: Generalized weakness and muscle atrophy. Left hip dressing clean and intact    Skin:     General: Skin is warm and dry.   Neurological:      General: No focal deficit present.      Mental Status: She is alert and oriented to person, place, and time.   Psychiatric:         Mood and Affect: Mood normal.         Behavior: Behavior normal.         Thought Content: Thought content normal.         Judgment: Judgment normal.   *MD performed and documented physical examination       Lines/Drains/Airways       None                 Labs  No results found for this or any previous visit (from the past 24 hour(s)).    Radiology  Left femur XR two view on 08/24/2022 at 12:41 p.m., IMPRESSION: There has been interval insertion of an intramedullary alva traversing a fracture of the midshaft and distal aspect of the femur position and alignment of the visualized osseous structures is anatomical multiple orthopedic screws identified in the distal femur    Assessment/Plan:     71 y.o. WF admitted on 8/31/2022    Left hip fracture  - s/p left hip IMN on 8/24/2022  - continue                Norco 5 mg/325 mg q.4 hours p.r.n.                 Lovenox 30 mg q.12 hours                     Robaxin 500 mg t.i.d.                 Multivitamin daily                 Tylenol 650 mg q.6 hours  - defer to physiatry for  rehab and pain management  - PT/OT/RT following     HTN  - BP elevated  - initiate HCTZ OTW if BP remains elevated  - initiate   Norvasc 5 mg daily (initiated 9/2)  - continue               Hydralazine 10 mg every 2 hours as needed for BP > 160/90               Labetalol 10 mg every 2 hours as needed for BP > 160/90     Normocytic anemia  - asymptomatic  - H/H stable   - no evidence of active bleeds  - will closely monitor and transfuse if needed      Anxiety  - stable   - continue                Lexapro 20 mg daily                Xanax 1 mg at bedtime p.r.n.     Insomnia  - improved  - continue                 Melatonin 6 mg at bedtime p.r.n.     Constipation  - last BM 8/31  - continue                Colace 100 mg b.i.d.      GERD  - Avoid spicy foods, and nothing to eat or drink within x2 hours of bedtime or laying flat (water is ok)   - Avoid NSAIDs (Advil, ibuprofen, naproxen...) and mcdonnell-2 inhibitors (Mobic, Celebrex)    - continue               Protonix 40 mg daily      ETOH dependence  - stable   - completed Librium protocol  - continue                Folic acid 1 mg daily                Thiamine 100 mg daily     Nicotine dependence   - smoking cessation counseling given on admission  - continue                Nicotine 21 mg patch q.day (initiated 9/1)     VTE Prophylaxis:  Lovenox 30 mg b.i.d.  COVID-19 testing:  Negative on 08/31/2022  COVID-19 vaccination status:  Unvaccinated     POA: Lesly (sister)  Living will: Yes  Contacts: Lesly Sierra (sister) [phone]     CODE STATUS: DNR  Internal Medicine (attending): Luis Singh MD  Physiatry (consulting):  Leo Parada MD     OUTPATIENT PROVIDERS  PCP: ALISHA Asher  Orthopedic surgery:  Silverio Vaughn MD     DISPOSITION: Condition stable.  Sleep hygiene, and appetite at goal.  Last BM 8/31.  BP moderately controlled.  No labs or imaging today.  Initiate Norvasc 5 mg daily.  Continue aggressive mobilization as tolerated.  Monitor closely.   Notify of  acute changes.      Iftikhar Escudero NP conducted independent physical examination and assisted with medical documentation.     Total time spent on this encounter including chart review and direct MD + NP 1-on-1 patient interaction: 46 minutes   Over 50% of this time was spent in counseling and coordination of care

## 2022-09-02 NOTE — PT/OT/SLP PROGRESS
Physical Therapy Inpatient Rehab Treatment    Patient Name:  Mary Lou Sierra   MRN:  [id number]    Recommendations:     Discharge Recommendations:  home health PT   Discharge Equipment Recommendations: wheelchair   Barriers to discharge: None    Assessment:     Mary Lou Sierra is a 71 y.o. female admitted with a medical diagnosis of S/p left hip fracture.  She presents with the following impairments/functional limitations:  weakness, impaired endurance, impaired self care skills, impaired functional mobility, gait instability, impaired balance, decreased lower extremity function, pain, orthopedic precautions .    Rehab Diagnosis:     Recent Surgery: * No surgery found *      General Precautions: Standard, fall     Orthopedic Precautions:LLE weight bearing as tolerated (Transfers only. NO AMBULATION.)     Braces: N/A    Rehab Prognosis: Good; patient would benefit from acute skilled PT services to address these deficits and reach maximum level of function.      History:     Past Medical History:   Diagnosis Date    Hypertension        No past surgical history on file.    Subjective     Chief Complaint: L Hip aching pain.  Patient/Family Comments/goals: N/A    Vitals   Vitals at Rest  /76   HR 82   O2 Sat    Pain 6/10     Vitals With Activity  BP    HR    O2 Sat    Pain      Respiratory Status: Room air    Patients cultural, spiritual, Jehovah's witness conflicts given the current situation: no      Objective:     Communicated with RN prior to session.  Patient found up in chair with peripheral IV  upon PT entry to room.    Pt is Oriented x3 and Alert, Cooperative, and Motivated.    Functional Mobility:        Current   Status   Discharge   Goal   Functional Area: Care Score:     Roll Left and Right    Independent   Sit to Lying 4 Overall supervision  Independent   Lying to Sitting on Side of Bed   Independent   Sit to Stand 4 With RW; overall CGA to trunk Independent   Chair/Bed-to-Chair Transfer 4 Stand pivot t/f  With RW; overall CGA to trunk; pt able to maintain WBAT to LLE with t/fs Independent   Car Transfer    Independent   Walk 10 Feet    Not attempted due to medical/safety concerns   Walk 50 Feet with Two Turns    Not attempted due to medical/safety concerns   Walk 150 Feet    Not attempted due to medical/safety concerns   Walk 10 Feet Uneven Surface    Not attempted due to medical/safety concerns   1 Step (Curb)    Not attempted due to medical/safety concerns   4 Steps    Not attempted due to medical/safety concerns   12 Steps    Not attempted due to medical/safety concerns   Picking Up Object    Set-up/clean-up   Wheel 50 Feet with Two Turns   Independent   Wheel 150 Feet   Independent       Therapeutic Activities and Exercises:  Toilet t/f with RW overall CGA to trunk; + void;     Activity Tolerance    Patient left supine with all lines intact, call button in reach, bed alarm on, and RN notified.    Education provided: roles and goals of PT/PTA, bed mob, safety awareness, and assistive device    Expected compliance:    GOALS:   Multidisciplinary Problems       Physical Therapy Goals          Problem: Physical Therapy    Goal Priority Disciplines Outcome Goal Variances Interventions   Physical Therapy Goal     PT, PT/OT Ongoing, Progressing     Description: Short Term goals      Bed Mobility   Roll Right and Left Setup or Clean-up Assistance .  Lying to sitting Setup or Clean-up Assistance .  Sitting to lying Setup or Clean-up Assistance .    Transfers    Pt will be able to perform Stand pivot  chair/bed to chair transfer With RW Setup or Clean-up Assistance .  Pt will be able to perform Sit to stand with RW Setup or Clean-up Assistance .  Pt will be able to perform Car transfer with RW Setup or Clean-up Assistance .    Wheelchair Mobility   Pt will be able to propel 500 feet in rubens wheelchair Independent.      Timeframe: By Re-eval                         Plan:     During this hospitalization, patient to be seen  5 x/week to address the identified rehab impairments via therapeutic activities, therapeutic exercises, wheelchair management/training and progress toward the following goals:    Plan of Care Expires:  09/06/22    Additional Information:     Patient scheduled 1430 - 1530. Pt was unable to participate in PT due to elevated BP beyond parameters. PT assisted Pt to bed using RW. Nurse aware of PT findings.      Time Tracking:     PT Received On: 09/02/22  Time In 1430     Time Out 1530  Total Time 60 min  Therapy Time PT Individual: 20  Missed Time: 40 Minutes  Time Missed due to: Other (Comment) (Elevated BP beyond parameters)    Billable Minutes: Therapeutic Activity 20 09/02/2022

## 2022-09-02 NOTE — PT/OT/SLP PROGRESS
"Occupational Therapy Inpatient Rehab Treatment    Name: Mary Lou Sierra  MRN: [id number]    Assessment:  Mary Lou Sierra is a 71 y.o. female admitted with a medical diagnosis of S/p left hip fracture.  She presents with the following impairments/functional limitations:  weakness, impaired endurance, impaired self care skills, pain, orthopedic precautions  .    General Precautions: Standard, fall     Orthopedic Precautions:LLE weight bearing as tolerated (Transfers only. NO AMBULATION.)     Braces: N/A    Rehab Prognosis: Good; patient would benefit from acute skilled OT services to address these deficits and reach maximum level of function.      History:     Past Medical History:   Diagnosis Date    Hypertension        No past surgical history on file.    Subjective     Orientation: Oriented x4    Chief Complaint: pain/edema    Patient/Family Comments/goals: "to get out of here"    Respiratory Status: Room air    Patients cultural, spiritual, Mu-ism conflicts given the current situation: no       Objective:     Patient found up in chair with peripheral IV  upon OT entry to room.    Limiting Factors for ADLs: motor, endurance, limited ROM, balance, weakness, and safety awareness     Therapeutic Activities  Pt educated on walker safety for bathroom and kitchen and use of accessories like walker bag/tray, arranging cabinets for easy access, etc. Pt verbalized understanding of information given.    Therapeutic Exercise  Pt performed BUE with 3# dowel in all planes times 20 reps each    Additional Treatments: Pt declined to get out of chair due to pain from increased swelling. OT applied DARIEL hose to decrease edema.  Also OT applied ice pack to L hip.    Patient left up in chair with legs elevated with all lines intact, call button in reach, and chair alarm on.     Education provided: Roles and goals of OT, ADLs, transfer training, assistive device, safety precautions, fall prevention, and home " safety    Multidisciplinary Problems       Occupational Therapy Goals          Problem: Occupational Therapy    Goal Priority Disciplines Outcome Interventions   Occupational Therapy Goal     OT, PT/OT Ongoing, Progressing    Description: Pt will perform LB dressing c AE independently  by d/c.  Pt will perform toileting Independently by d/c.  Pt will perform toilet t/f Independently by d/c.    Pt will perform shower t/f c SB assist by d/c.                         Time Tracking     OT Received On: 09/02/22  Time In 1030     Time Out 1130  Total Time 60 min  Therapy Time: OT Individual: 60  Missed Time:    Missed Time Reason:      Billable Minutes: Self Care/Home Management 30 and Therapeutic Exercise 30    09/02/2022

## 2022-09-02 NOTE — NURSING
During am vitals pt BP was 182/76 was reported by aid. Entered pt room, got better fitting arm cuff for pt, began BP. Incorrect route ordered for PRN BP medication. NP notified by charge. BP came down to 160/68. No PRN medication given for HBP

## 2022-09-02 NOTE — PLAN OF CARE
Problem: Rehabilitation ([facility]) Plan of Care  Goal: Plan of Care Review  Outcome: Ongoing, Progressing  Goal: Patient-Specific Goal (Individualized)  Outcome: Ongoing, Progressing  Goal: Absence of New-Onset Illness or Injury  Outcome: Ongoing, Progressing  Intervention: Prevent Fall and Fall Injury  Flowsheets (Taken 9/2/2022 0029)  Safety Promotion/Fall Prevention:   instructed to call staff for mobility   side rails raised x 2   room near unit station   medications reviewed   high risk medications identified   Fall Risk signage in place   nonskid shoes/socks when out of bed   bedside commode chair   assistive device/personal item within reach   gait belt with ambulation  Intervention: Prevent Infection  Flowsheets (Taken 9/2/2022 0029)  Infection Prevention:   hand hygiene promoted   rest/sleep promoted   single patient room provided   equipment surfaces disinfected  Goal: Optimal Comfort and Wellbeing  Outcome: Ongoing, Progressing  Intervention: Provide Person-Centered Care  Flowsheets (Taken 9/2/2022 0029)  Trust Relationship/Rapport:   care explained   choices provided   emotional support provided   empathic listening provided   thoughts/feelings acknowledged   reassurance provided   questions encouraged   questions answered  Goal: Readiness for Transition of Care  Outcome: Ongoing, Progressing     Problem: Skin Injury Risk Increased  Goal: Skin Health and Integrity  Outcome: Ongoing, Progressing     Problem: Fall Injury Risk  Goal: Absence of Fall and Fall-Related Injury  Outcome: Ongoing, Progressing

## 2022-09-03 PROCEDURE — S4991 NICOTINE PATCH NONLEGEND: HCPCS | Performed by: NURSE PRACTITIONER

## 2022-09-03 PROCEDURE — 97542 WHEELCHAIR MNGMENT TRAINING: CPT | Mod: CQ

## 2022-09-03 PROCEDURE — 97110 THERAPEUTIC EXERCISES: CPT | Mod: CQ

## 2022-09-03 PROCEDURE — 25000003 PHARM REV CODE 250: Performed by: NURSE PRACTITIONER

## 2022-09-03 PROCEDURE — 11800000 HC REHAB PRIVATE ROOM

## 2022-09-03 PROCEDURE — 97530 THERAPEUTIC ACTIVITIES: CPT | Mod: CQ

## 2022-09-03 PROCEDURE — 94799 UNLISTED PULMONARY SVC/PX: CPT

## 2022-09-03 PROCEDURE — 63600175 PHARM REV CODE 636 W HCPCS: Performed by: NURSE PRACTITIONER

## 2022-09-03 RX ORDER — POLYETHYLENE GLYCOL 3350 17 G/17G
17 POWDER, FOR SOLUTION ORAL DAILY
Status: DISCONTINUED | OUTPATIENT
Start: 2022-09-03 | End: 2022-09-09 | Stop reason: HOSPADM

## 2022-09-03 RX ORDER — HYDROCHLOROTHIAZIDE 25 MG/1
25 TABLET ORAL DAILY
Status: DISCONTINUED | OUTPATIENT
Start: 2022-09-03 | End: 2022-09-09 | Stop reason: HOSPADM

## 2022-09-03 RX ADMIN — HYDROCHLOROTHIAZIDE 25 MG: 25 TABLET ORAL at 11:09

## 2022-09-03 RX ADMIN — DOCUSATE SODIUM 100 MG: 100 CAPSULE, LIQUID FILLED ORAL at 09:09

## 2022-09-03 RX ADMIN — POLYETHYLENE GLYCOL 3350 17 G: 17 POWDER, FOR SOLUTION ORAL at 07:09

## 2022-09-03 RX ADMIN — HYDROCODONE BITARTRATE AND ACETAMINOPHEN 1 TABLET: 5; 325 TABLET ORAL at 09:09

## 2022-09-03 RX ADMIN — HYDROCODONE BITARTRATE AND ACETAMINOPHEN 1 TABLET: 5; 325 TABLET ORAL at 01:09

## 2022-09-03 RX ADMIN — METHOCARBAMOL 500 MG: 500 TABLET ORAL at 05:09

## 2022-09-03 RX ADMIN — ENOXAPARIN SODIUM 30 MG: 30 INJECTION SUBCUTANEOUS at 09:09

## 2022-09-03 RX ADMIN — BISACODYL 10 MG: 10 SUPPOSITORY RECTAL at 11:09

## 2022-09-03 RX ADMIN — HYDROCODONE BITARTRATE AND ACETAMINOPHEN 1 TABLET: 5; 325 TABLET ORAL at 05:09

## 2022-09-03 RX ADMIN — METHOCARBAMOL 500 MG: 500 TABLET ORAL at 09:09

## 2022-09-03 RX ADMIN — PANTOPRAZOLE SODIUM 40 MG: 40 TABLET, DELAYED RELEASE ORAL at 09:09

## 2022-09-03 RX ADMIN — Medication 100 MG: at 09:09

## 2022-09-03 RX ADMIN — METHOCARBAMOL 500 MG: 500 TABLET ORAL at 02:09

## 2022-09-03 RX ADMIN — MULTIPLE VITAMINS W/ MINERALS TAB 1 TABLET: TAB at 09:09

## 2022-09-03 RX ADMIN — AMLODIPINE BESYLATE 5 MG: 5 TABLET ORAL at 05:09

## 2022-09-03 RX ADMIN — FOLIC ACID 1 MG: 1 TABLET ORAL at 09:09

## 2022-09-03 RX ADMIN — ESCITALOPRAM OXALATE 20 MG: 20 TABLET ORAL at 09:09

## 2022-09-03 RX ADMIN — NICOTINE 1 PATCH: 21 PATCH, EXTENDED RELEASE TRANSDERMAL at 09:09

## 2022-09-03 NOTE — PT/OT/SLP PROGRESS
Physical Therapy Inpatient Rehab Treatment    Patient Name:  Mary Lou Sierra   MRN:  [id number]    Recommendations:     Discharge Recommendations:  home health PT   Discharge Equipment Recommendations: wheelchair   Barriers to discharge: Inaccessible home and Decreased caregiver support    Assessment:     Mary Lou Sierra is a 71 y.o. female admitted with a medical diagnosis of S/p left hip fracture.  She presents with the following impairments/functional limitations: weakness, impaired endurance, impaired self care skills, impaired functional mobility, impaired balance, decreased lower extremity function, pain, decreased ROM, orthopedic precautions  .    Rehab Diagnosis:     Recent Surgery: * No surgery found *      General Precautions: Standard, fall     Orthopedic Precautions:LLE weight bearing as tolerated (Transfers only. NO AMBULATION.)     Braces: N/A    Rehab Prognosis: Good and Fair; patient would benefit from acute skilled PT services to address these deficits and reach maximum level of function.      History:     Past Medical History:   Diagnosis Date    Hypertension        No past surgical history on file.    Subjective     Chief Complaint: L hip spasms 5/10  Patient/Family Comments/goals:     Vitals   Vitals at Rest  /76   HR 71   O2 Sat    Pain      Respiratory Status: Room air    Patients cultural, spiritual, Holiness conflicts given the current situation: no      Objective:     Communicated with NSG prior to session. Patient found HOB elevated upon PT entry to room.    Pt is Oriented x3 and Alert, Cooperative, and Motivated.    Functional Mobility:   Bed Mobility:     Rolling Left:  independence  Rolling Right: independence  Supine to Sit: modified independence  Sit to Supine: modified independence  Transfers:     Sit to Stand:  stand by assistance with rolling walker  Bed to Chair: stand by assistance with  rolling walker  using  Stand Pivot  Toilet Transfer: contact guard assistance with   rolling walker  using  Stand Pivot  Wheelchair Propulsion:  Pt propelled Standard wheelchair x 220 + 100 feet on Level tile with  Bilateral upper extremity with Independent.      Current   Status  Discharge   Goal   Functional Area: Care Score:    Roll Left and Right 6 Independent   Sit to Lying 6 Independent   Lying to Sitting on Side of Bed 6 Independent   Sit to Stand 4 Independent   Chair/Bed-to-Chair Transfer   Independent   Car Transfer   Independent   Walk 10 Feet   Not attempted due to medical/safety concerns   Walk 50 Feet with Two Turns   Not attempted due to medical/safety concerns   Walk 150 Feet   Not attempted due to medical/safety concerns   Walk 10 Feet Uneven Surface   Not attempted due to medical/safety concerns   1 Step (Curb)   Not attempted due to medical/safety concerns   4 Steps   Not attempted due to medical/safety concerns   12 Steps   Not attempted due to medical/safety concerns   Picking Up Object   Set-up/clean-up   Wheel 50 Feet with Two Turns 6 Independent   Wheel 150 Feet 6 Independent       Therapeutic Activities and Exercises:  Semi supine 2x10 glut sets, quad sets, B SLR, and hip/knee flex  Supported sitting 2# RLE 0#LLE, B marches LAQs, and ankle pumps.     Activity Tolerance    Patient left  sitting on BSC  with call button in reach and NSG notified 2/2 PTAs time constraints.     Education provided: roles and goals of PT/PTA, transfer training, bed mob, gait training, and wheelchair management    Expected compliance:    GOALS:   Multidisciplinary Problems       Physical Therapy Goals          Problem: Physical Therapy    Goal Priority Disciplines Outcome Goal Variances Interventions   Physical Therapy Goal     PT, PT/OT Ongoing, Progressing     Description: Short Term goals      Bed Mobility   Roll Right and Left Setup or Clean-up Assistance .  Lying to sitting Setup or Clean-up Assistance .  Sitting to lying Setup or Clean-up Assistance .    Transfers    Pt will be able to  perform Stand pivot  chair/bed to chair transfer With RW Setup or Clean-up Assistance .  Pt will be able to perform Sit to stand with RW Setup or Clean-up Assistance .  Pt will be able to perform Car transfer with RW Setup or Clean-up Assistance .    Wheelchair Mobility   Pt will be able to propel 500 feet in rubens wheelchair Independent.      Timeframe: By Re-eval                         Plan:     During this hospitalization, patient to be seen 5 x/week to address the identified rehab impairments via therapeutic activities, therapeutic exercises, wheelchair management/training and progress toward the following goals:    Plan of Care Expires:  09/06/22    Additional Information:         Time Tracking:     PT Received On:    Time In 1030     Time Out 1130  Total Time 60 min  Therapy Time PT Individual: 60  Missed Time:    Time Missed due to:      Billable Minutes: Therapeutic Activity 15, Therapeutic Exercise 30, and Train/Wheelchair Management 15    09/03/2022

## 2022-09-03 NOTE — PLAN OF CARE
Problem: Physical Therapy  Goal: Physical Therapy Goal  Description: Short Term goals      Bed Mobility   Roll Right and Left Setup or Clean-up Assistance . Met  Lying to sitting Setup or Clean-up Assistance . Met  Sitting to lying Setup or Clean-up Assistance . Met    Transfers    Pt will be able to perform Stand pivot  chair/bed to chair transfer With RW Setup or Clean-up Assistance .  Pt will be able to perform Sit to stand with RW Setup or Clean-up Assistance .  Pt will be able to perform Car transfer with RW Setup or Clean-up Assistance .    Wheelchair Mobility   Pt will be able to propel 500 feet in rubens wheelchair Independent.      Timeframe: By Re-eval    Outcome: Ongoing, Progressing

## 2022-09-03 NOTE — PROGRESS NOTES
[facility] ([facility])  Rehab Progress Note    Patient Name: Mary Lou Sierra  MRN: [id number]  Age: 71 y.o. Sex: female  : 1950  Hospital Length of Stay: 3 days  Date of Service: 9/3/2022   Chief Complaint: Left hip fracture s/p IMN on 2022    Subjective:     Basic Information  Admit Information: 71-year-old white female presented to [facility] ED on 2022 complaining of left hip pain after falling down 4 stairs.  PMH significant for HTN, ETOH dependence, nicotine dependence, and anxiety.  Workup significant for left hip fracture.  Tolerated left hip IM nailing on  out perioperative complications.  Tolerated Librium protocol, thiamine and folic acid replacement.  Orthopedic surgery recommended WBAT to E and FROM to [facility].  Tolerated transfer to [facility] inpatient rehab unit on  without incident.  Today's Information: No acute events overnight.  Sitting in a chair.  Reports good sleep and appetite.  Last BM reported .  Vital signs at goal with no recorded fevers.  No labs or imaging today.    Review of patient's allergies indicates:  No Known Allergies     Current Facility-Administered Medications:     acetaminophen tablet 650 mg, 650 mg, Oral, Q6H, Timo A Kena, FNP, 650 mg at 22 1710    ALPRAZolam tablet 1 mg, 1 mg, Oral, Nightly PRN, Timo A Kena, FNP, 1 mg at 22 205    amLODIPine tablet 5 mg, 5 mg, Oral, Daily, Timo A Kena, FNP, 5 mg at 22 0514    benzonatate capsule 100 mg, 100 mg, Oral, TID PRN, Timo A Kena, FNP    bisacodyL suppository 10 mg, 10 mg, Rectal, Daily PRN, Timo A Kena, FNP, 10 mg at 22 1528    docusate sodium capsule 100 mg, 100 mg, Oral, BID, Timo A Kena, FNP, 100 mg at 22 204    enoxaparin injection 30 mg, 30 mg, Subcutaneous, Q12H, Timo A Kena, FNP, 30 mg at 22    EScitalopram oxalate tablet 20 mg, 20 mg, Oral, Daily, ALISHA Proctor,  "20 mg at 09/02/22 0811    folic acid tablet 1 mg, 1 mg, Oral, Daily, Timo A Kena, FNP, 1 mg at 09/02/22 0811    hydrALAZINE injection 10 mg, 10 mg, Intravenous, Q4H PRN, Timo A Kena, FNP    HYDROcodone-acetaminophen 5-325 mg per tablet 1 tablet, 1 tablet, Oral, Q4H PRN, Timo A Kena, FNP, 1 tablet at 09/03/22 0515    hydrOXYzine pamoate capsule 50 mg, 50 mg, Oral, Nightly PRN, Timo A Kena, FNP    labetalol 20 mg/4 mL (5 mg/mL) IV syring, 10 mg, Intravenous, Q4H PRN, Timo A Kena, FNP    melatonin tablet 6 mg, 6 mg, Oral, Nightly PRN, Timo A Kena, FNP    methocarbamoL tablet 500 mg, 500 mg, Oral, TID, Timo A Kena, FNP, 500 mg at 09/03/22 0525    metoprolol injection 10 mg, 10 mg, Intravenous, Q2H PRN, Timo A Kena, FNP    multivitamin tablet, 1 tablet, Oral, Daily, Timo A Kena, FNP, 1 tablet at 09/02/22 0811    nicotine 21 mg/24 hr 1 patch, 1 patch, Transdermal, Daily, Timo A Kena, FNP, 1 patch at 09/02/22 0800    nitroGLYCERIN SL tablet 0.4 mg, 0.4 mg, Sublingual, Q5 Min PRN, Timo A Kena, FNP    ondansetron disintegrating tablet 4 mg, 4 mg, Oral, Q6H PRN, Timo A Kena, FNP    pantoprazole EC tablet 40 mg, 40 mg, Oral, Daily, Timo A Kena, FNP, 40 mg at 09/02/22 0811    polyethylene glycol packet 17 g, 17 g, Oral, BID PRN, Timo A Kena, FNP    promethazine tablet 25 mg, 25 mg, Oral, Q6H PRN, Timo A Kena, FNP    thiamine tablet 100 mg, 100 mg, Oral, Daily, Timo A Kena, FNP, 100 mg at 09/02/22 0811     Review of Systems   Complete 12-point review of symptoms negative except for what's mentioned in HPI     Objective:     BP (!) 128/55   Pulse 83   Temp 97.8 °F (36.6 °C) (Oral)   Resp 20   Ht 5' 2.5" (1.588 m)   Wt 72.3 kg (159 lb 6.3 oz)   SpO2 96%   BMI 28.69 kg/m²        Intake/Output Summary (Last 24 hours) at 9/3/2022 0631  Last data filed at 9/2/2022 1800  Gross per 24 hour   Intake 840 " ml   Output --   Net 840 ml         Physical Exam  Constitutional:       Appearance: Normal appearance.   HENT:      Head: Normocephalic.      Mouth/Throat:      Mouth: Mucous membranes are moist.   Eyes:      Pupils: Pupils are equal, round, and reactive to light.   Cardiovascular:      Rate and Rhythm: Normal rate and regular rhythm.      Heart sounds: Normal heart sounds.      Comments: 2/6 murmur LCW, 5th ICS, MCL.  LLE 1+ edema  Pulmonary:      Effort: Pulmonary effort is normal.      Breath sounds: Normal breath sounds.   Abdominal:      General: Bowel sounds are normal.      Palpations: Abdomen is soft.   Musculoskeletal:      Cervical back: Neck supple.      Comments: Generalized weakness and muscle atrophy. Left hip dressing clean and intact    Skin:     General: Skin is warm and dry.   Neurological:      General: No focal deficit present.      Mental Status: She is alert and oriented to person, place, and time.   Psychiatric:         Mood and Affect: Mood normal.         Behavior: Behavior normal.         Thought Content: Thought content normal.         Judgment: Judgment normal.       Lines/Drains/Airways       None                 Labs  No results found for this or any previous visit (from the past 24 hour(s)).    Radiology  Left femur XR two view on 08/24/2022 at 12:41 p.m., IMPRESSION: There has been interval insertion of an intramedullary alva traversing a fracture of the midshaft and distal aspect of the femur position and alignment of the visualized osseous structures is anatomical multiple orthopedic screws identified in the distal femur    Assessment/Plan:     71 y.o. WF admitted on 8/31/2022    Left hip fracture  - s/p left hip IMN on 8/24/2022  - continue                Norco 5 mg/325 mg q.4 hours p.r.n.                 Lovenox 30 mg q.12 hours                     Robaxin 500 mg t.i.d.                 Multivitamin daily                 Tylenol 650 mg q.6 hours  - defer to physiatry for rehab and  pain management  - PT/OT/RT following     HTN  - BP mod controlled  - initiate   HCTZ 25 mg daily (initiated 9/3)  - continue   Norvasc 5 mg daily (initiated 9/2)               Hydralazine 10 mg every 2 hours as needed for BP > 160/90               Labetalol 10 mg every 2 hours as needed for BP > 160/90     Normocytic anemia  - asymptomatic  - H/H stable   - no evidence of active bleeds  - will closely monitor and transfuse if needed      Anxiety  - stable   - continue                Lexapro 20 mg daily                Xanax 1 mg at bedtime p.r.n.     Insomnia  - improved  - continue                 Melatonin 6 mg at bedtime p.r.n.     Constipation  - last BM 8/31  - initiate   MiraLax 17 g daily (initiated 9/3)  - continue                Colace 100 mg b.i.d.      GERD  - Avoid spicy foods, and nothing to eat or drink within x2 hours of bedtime or laying flat (water is ok)   - Avoid NSAIDs (Advil, ibuprofen, naproxen...) and mcdonnell-2 inhibitors (Mobic, Celebrex)    - continue               Protonix 40 mg daily      ETOH dependence  - stable   - completed Librium protocol  - continue     Multivitamin daily                Folic acid 1 mg daily                Thiamine 100 mg daily     Nicotine dependence   - smoking cessation counseling given on admission  - continue                Nicotine 21 mg patch q.day (initiated 9/1)     VTE Prophylaxis:  Lovenox 30 mg b.i.d.  COVID-19 testing:  Negative on 08/31/2022  COVID-19 vaccination status:  Unvaccinated     POA: Lesly (sister)  Living will: Yes  Contacts: Lesly Sierra (sister) [phone]     CODE STATUS: DNR  Internal Medicine (attending): Luis Singh MD  Physiatry (consulting):  Leo Parada MD     OUTPATIENT PROVIDERS  PCP: ALISHA Asher  Orthopedic surgery:  Silverio Vaughn MD     DISPOSITION: Condition stable.  Sleep hygiene, and appetite at goal.  Last BM 8/31.  BP mod controlled.   No labs or imaging today.  Initiate HCTZ 25 mg daily (home med).  Continue aggressive mobilization as tolerated.  Monitor closely.  Notify of  acute changes.  All meds reviewed.    Total time spent on this encounter including chart review and direct 1-on-1 patient interaction: 41 minutes   Over 50% of this time was spent in counseling and coordination of care

## 2022-09-04 PROCEDURE — 11800000 HC REHAB PRIVATE ROOM

## 2022-09-04 PROCEDURE — 25000003 PHARM REV CODE 250: Performed by: NURSE PRACTITIONER

## 2022-09-04 PROCEDURE — 94799 UNLISTED PULMONARY SVC/PX: CPT

## 2022-09-04 PROCEDURE — 97535 SELF CARE MNGMENT TRAINING: CPT

## 2022-09-04 PROCEDURE — 63600175 PHARM REV CODE 636 W HCPCS: Performed by: NURSE PRACTITIONER

## 2022-09-04 PROCEDURE — S4991 NICOTINE PATCH NONLEGEND: HCPCS | Performed by: NURSE PRACTITIONER

## 2022-09-04 RX ADMIN — HYDROCODONE BITARTRATE AND ACETAMINOPHEN 1 TABLET: 5; 325 TABLET ORAL at 08:09

## 2022-09-04 RX ADMIN — METHOCARBAMOL 500 MG: 500 TABLET ORAL at 02:09

## 2022-09-04 RX ADMIN — ENOXAPARIN SODIUM 30 MG: 30 INJECTION SUBCUTANEOUS at 08:09

## 2022-09-04 RX ADMIN — ESCITALOPRAM OXALATE 20 MG: 20 TABLET ORAL at 08:09

## 2022-09-04 RX ADMIN — MULTIPLE VITAMINS W/ MINERALS TAB 1 TABLET: TAB at 08:09

## 2022-09-04 RX ADMIN — DOCUSATE SODIUM 100 MG: 100 CAPSULE, LIQUID FILLED ORAL at 08:09

## 2022-09-04 RX ADMIN — AMLODIPINE BESYLATE 5 MG: 5 TABLET ORAL at 08:09

## 2022-09-04 RX ADMIN — NICOTINE 1 PATCH: 21 PATCH, EXTENDED RELEASE TRANSDERMAL at 08:09

## 2022-09-04 RX ADMIN — ACETAMINOPHEN 325MG 650 MG: 325 TABLET ORAL at 11:09

## 2022-09-04 RX ADMIN — PANTOPRAZOLE SODIUM 40 MG: 40 TABLET, DELAYED RELEASE ORAL at 08:09

## 2022-09-04 RX ADMIN — HYDROCHLOROTHIAZIDE 25 MG: 25 TABLET ORAL at 08:09

## 2022-09-04 RX ADMIN — ACETAMINOPHEN 325MG 650 MG: 325 TABLET ORAL at 05:09

## 2022-09-04 RX ADMIN — METHOCARBAMOL 500 MG: 500 TABLET ORAL at 05:09

## 2022-09-04 RX ADMIN — METHOCARBAMOL 500 MG: 500 TABLET ORAL at 08:09

## 2022-09-04 RX ADMIN — FOLIC ACID 1 MG: 1 TABLET ORAL at 08:09

## 2022-09-04 RX ADMIN — POLYETHYLENE GLYCOL 3350 17 G: 17 POWDER, FOR SOLUTION ORAL at 08:09

## 2022-09-04 RX ADMIN — HYDROCODONE BITARTRATE AND ACETAMINOPHEN 1 TABLET: 5; 325 TABLET ORAL at 03:09

## 2022-09-04 RX ADMIN — Medication 100 MG: at 08:09

## 2022-09-04 NOTE — PT/OT/SLP PROGRESS
"Occupational Therapy Inpatient Rehab Treatment    Name: Mary Lou Sierra  MRN: [id number]    Assessment:  Mary Lou Sierra is a 71 y.o. female admitted with a medical diagnosis of S/p left hip fracture.  She presents with the following impairments/functional limitations:  weakness, impaired endurance, impaired self care skills, impaired functional mobility, impaired balance, decreased upper extremity function, decreased lower extremity function, decreased safety awareness, pain, edema, orthopedic precautions  .    General Precautions: Standard, fall     Orthopedic Precautions:LLE weight bearing as tolerated (transfers only)     Braces: N/A    Rehab Prognosis: Good; patient would benefit from acute skilled OT services to address these deficits and reach maximum level of function.      History:     Past Medical History:   Diagnosis Date    Hypertension        No past surgical history on file.    Subjective     Orientation: Oriented x4    Chief Complaint: pain    Patient/Family Comments/goals: "to return home"    Vitals   Vitals at Rest  /69   HR 84       Respiratory Status: Room air    Patients cultural, spiritual, Church conflicts given the current situation: no       Objective:     Patient found supine with peripheral IV  upon OT entry to room.    Mobility   Patient completed:  Supine to Sit with independence  Sit to Stand Transfer with independence with rolling walker  Stand to Sit Transfer with independence with rolling walker  Toilet Transfer Stand Pivot technique with supervision with  rolling walker  Tub Transfer Stand Pivot technique with supervision with rolling walker    Functional Mobility  In room transfers for ADLs.    ADLs   Current Status   Eating     Oral Hygiene 6   Shower, Bathe Self 6   Upper Body Dressing 6   Lower Body Dressing 5   Toileting Hygiene 6   Toilet Transfer 5   Putting On, Taking Off Footwear 5     Limiting Factors for ADLs: motor, endurance, limited ROM, balance, weakness, " coordination, and safety awareness     Patient left up in chair with call button in reach and chair alarm on.     Education provided: Roles and goals of OT, ADLs, transfer training, bed mobility, assistive device, sequencing, safety precautions, fall prevention, and home safety    Multidisciplinary Problems       Occupational Therapy Goals          Problem: Occupational Therapy    Goal Priority Disciplines Outcome Interventions   Occupational Therapy Goal     OT, PT/OT Ongoing, Progressing    Description: Pt will perform LB dressing c AE independently  by d/c.  Pt will perform toileting Independently by d/c.  Pt will perform toilet t/f Independently by d/c.    Pt will perform shower t/f c SB assist by d/c.                         Time Tracking     OT Received On: 09/04/22  Time In 0730     Time Out 0830  Total Time 60 min  Therapy Time: OT Individual: 60  Missed Time:    Missed Time Reason:      Billable Minutes: Self Care/Home Management 60    09/04/2022

## 2022-09-04 NOTE — PROGRESS NOTES
[facility] ([facility])  Rehab Progress Note    Patient Name: Mary Lou Sierra  MRN: [id number]  Age: 71 y.o. Sex: female  : 1950  Hospital Length of Stay: 4 days  Date of Service: 2022   Chief Complaint: Left hip fracture s/p IMN on 2022    Subjective:     Basic Information  Admit Information: 71-year-old white female presented to [facility] ED on 2022 complaining of left hip pain after falling down 4 stairs.  PMH significant for HTN, ETOH dependence, nicotine dependence, and anxiety.  Workup significant for left hip fracture.  Tolerated left hip IM nailing on  out perioperative complications.  Tolerated Librium protocol, thiamine and folic acid replacement.  Orthopedic surgery recommended WBAT to E and FROM to [facility].  Tolerated transfer to [facility] inpatient rehab unit on  without incident.  Today's Information: No acute events overnight.  Sitting in a chair.  Reports good sleep and appetite.  Last BM reported 9/3.  Vital signs at goal with no recorded fevers.  No labs or imaging today.    Review of patient's allergies indicates:  No Known Allergies     Current Facility-Administered Medications:     acetaminophen tablet 650 mg, 650 mg, Oral, Q6H, Timo A Kena, FNP, 650 mg at 22 1710    ALPRAZolam tablet 1 mg, 1 mg, Oral, Nightly PRN, Timo A Kena, FNP, 1 mg at 22 205    amLODIPine tablet 5 mg, 5 mg, Oral, Daily, Timo A Kena, FNP, 5 mg at 22 0514    benzonatate capsule 100 mg, 100 mg, Oral, TID PRN, Timo A Kena, FNP    bisacodyL suppository 10 mg, 10 mg, Rectal, Daily PRN, Timo A Kena, FNP, 10 mg at 22 1152    docusate sodium capsule 100 mg, 100 mg, Oral, BID, Timo A Kena, FNP, 100 mg at 22    enoxaparin injection 30 mg, 30 mg, Subcutaneous, Q12H, Timo A Kena, FNP, 30 mg at 22    EScitalopram oxalate tablet 20 mg, 20 mg, Oral, Daily, Timo Escudero, MENDEZP, 20  "mg at 09/03/22 0918    folic acid tablet 1 mg, 1 mg, Oral, Daily, Timo A Kena, FNP, 1 mg at 09/03/22 0918    hydrALAZINE injection 10 mg, 10 mg, Intravenous, Q4H PRN, Timo A Kena, FNP    hydroCHLOROthiazide tablet 25 mg, 25 mg, Oral, Daily, Timo A Kena, FNP, 25 mg at 09/03/22 1152    HYDROcodone-acetaminophen 5-325 mg per tablet 1 tablet, 1 tablet, Oral, Q4H PRN, Timo A Kena, FNP, 1 tablet at 09/04/22 0357    hydrOXYzine pamoate capsule 50 mg, 50 mg, Oral, Nightly PRN, Timo A Kena, FNP    labetalol 20 mg/4 mL (5 mg/mL) IV syring, 10 mg, Intravenous, Q4H PRN, Timo A Kena, FNP    melatonin tablet 6 mg, 6 mg, Oral, Nightly PRN, Timo A Kena, FNP    methocarbamoL tablet 500 mg, 500 mg, Oral, TID, Timo A Kena, FNP, 500 mg at 09/03/22 2110    metoprolol injection 10 mg, 10 mg, Intravenous, Q2H PRN, Timo A Kena, FNP    multivitamin tablet, 1 tablet, Oral, Daily, Timo A Kena, FNP, 1 tablet at 09/03/22 0918    nicotine 21 mg/24 hr 1 patch, 1 patch, Transdermal, Daily, Timo A Kena, FNP, 1 patch at 09/03/22 0917    nitroGLYCERIN SL tablet 0.4 mg, 0.4 mg, Sublingual, Q5 Min PRN, Timo A Kena, FNP    ondansetron disintegrating tablet 4 mg, 4 mg, Oral, Q6H PRN, Timo A Kena, FNP    pantoprazole EC tablet 40 mg, 40 mg, Oral, Daily, Timo A Kena, FNP, 40 mg at 09/03/22 0918    polyethylene glycol packet 17 g, 17 g, Oral, BID PRN, Timo A Kena, FNP, 17 g at 09/03/22 0707    polyethylene glycol packet 17 g, 17 g, Oral, Daily, Timo RONQUILLO Kena, FNP    promethazine tablet 25 mg, 25 mg, Oral, Q6H PRN, Timo A Kena, FNP    thiamine tablet 100 mg, 100 mg, Oral, Daily, Timo A Kena, FNP, 100 mg at 09/03/22 0918     Review of Systems   Complete 12-point review of symptoms negative except for what's mentioned in HPI     Objective:     /62   Pulse 79   Temp 98 °F (36.7 °C) (Oral)   Resp 16   Ht 5' 2.5" " (1.588 m)   Wt 72.3 kg (159 lb 6.3 oz)   SpO2 97%   BMI 28.69 kg/m²        Intake/Output Summary (Last 24 hours) at 9/4/2022 0449  Last data filed at 9/3/2022 1200  Gross per 24 hour   Intake 480 ml   Output --   Net 480 ml         Physical Exam  Constitutional:       Appearance: Normal appearance.   HENT:      Head: Normocephalic.      Mouth/Throat:      Mouth: Mucous membranes are moist.   Eyes:      Pupils: Pupils are equal, round, and reactive to light.   Cardiovascular:      Rate and Rhythm: Normal rate and regular rhythm.      Heart sounds: Normal heart sounds.      Comments: 2/6 murmur LCW, 5th ICS, MCL.  LLE 1+ edema  Pulmonary:      Effort: Pulmonary effort is normal.      Breath sounds: Normal breath sounds.   Abdominal:      General: Bowel sounds are normal.      Palpations: Abdomen is soft.   Musculoskeletal:      Cervical back: Neck supple.      Comments: Generalized weakness and muscle atrophy. Left hip dressing clean and intact    Skin:     General: Skin is warm and dry.   Neurological:      General: No focal deficit present.      Mental Status: She is alert and oriented to person, place, and time.   Psychiatric:         Mood and Affect: Mood normal.         Behavior: Behavior normal.         Thought Content: Thought content normal.         Judgment: Judgment normal.       Lines/Drains/Airways       None                 Labs  No results found for this or any previous visit (from the past 24 hour(s)).    Radiology  Left femur XR two view on 08/24/2022 at 12:41 p.m., IMPRESSION: There has been interval insertion of an intramedullary alva traversing a fracture of the midshaft and distal aspect of the femur position and alignment of the visualized osseous structures is anatomical multiple orthopedic screws identified in the distal femur    Assessment/Plan:     71 y.o. WF admitted on 8/31/2022    Left hip fracture  - s/p left hip IMN on 8/24/2022  - continue                Norco 5 mg/325 mg q.4 hours  p.r.n.                 Lovenox 30 mg q.12 hours                     Robaxin 500 mg t.i.d.                 Multivitamin daily                 Tylenol 650 mg q.6 hours  - defer to physiatry for rehab and pain management  - PT/OT/RT following     HTN  - BP at goal!!  - continue  HCTZ 25 mg daily (initiated 9/3)   Norvasc 5 mg daily (initiated 9/2)               Hydralazine 10 mg every 2 hours as needed for BP > 160/90               Labetalol 10 mg every 2 hours as needed for BP > 160/90     Normocytic anemia  - asymptomatic  - H/H stable   - no evidence of active bleeds  - will closely monitor and transfuse if needed      Anxiety  - stable   - continue                Lexapro 20 mg daily                Xanax 1 mg at bedtime p.r.n.     Insomnia  - improved  - continue                 Melatonin 6 mg at bedtime p.r.n.     Constipation  - stable  - continue     MiraLax 17 g daily (initiated 9/3)                Colace 100 mg b.i.d.      GERD  - Avoid spicy foods, and nothing to eat or drink within x2 hours of bedtime or laying flat (water is ok)   - Avoid NSAIDs (Advil, ibuprofen, naproxen...) and mcdonnell-2 inhibitors (Mobic, Celebrex)    - continue               Protonix 40 mg daily      ETOH dependence  - stable   - completed Librium protocol  - continue     Multivitamin daily                Folic acid 1 mg daily                Thiamine 100 mg daily     Nicotine dependence   - smoking cessation counseling given on admission  - continue                Nicotine 21 mg patch q.day (initiated 9/1)     VTE Prophylaxis:  Lovenox 30 mg b.i.d.  COVID-19 testing:  Negative on 08/31/2022  COVID-19 vaccination status:  Unvaccinated     POA: Lesly (sister)  Living will: Yes  Contacts: Lesly Sierra (sister) [phone]     CODE STATUS: DNR  Internal Medicine (attending): Luis Singh MD  Physiatry (consulting):  Leo Parada MD     OUTPATIENT PROVIDERS  PCP: ALISHA Asher  Orthopedic surgery:  Silverio Vaughn MD      DISPOSITION: Condition stable.  Sleep hygiene, and appetite at goal.  Last BM 9/3.  Vital signs at goal with no recorded fevers.  No labs or imaging today.  BP improved since initiation of HCTZ on 09/03.  Continue aggressive mobilization as tolerated.  Monitor closely.  Notify of  acute changes.  Repeat lab work in the morning.    Total time spent on this encounter including chart review and direct 1-on-1 patient interaction: 37 minutes   Over 50% of this time was spent in counseling and coordination of care

## 2022-09-05 LAB
ALBUMIN SERPL-MCNC: 3.1 GM/DL (ref 3.4–4.8)
ALBUMIN/GLOB SERPL: 0.8 RATIO (ref 1.1–2)
ALP SERPL-CCNC: 166 UNIT/L (ref 40–150)
ALT SERPL-CCNC: 36 UNIT/L (ref 0–55)
AST SERPL-CCNC: 34 UNIT/L (ref 5–34)
BASOPHILS # BLD AUTO: 0.07 X10(3)/MCL (ref 0–0.2)
BASOPHILS NFR BLD AUTO: 0.8 %
BILIRUBIN DIRECT+TOT PNL SERPL-MCNC: 0.8 MG/DL
BUN SERPL-MCNC: 9.8 MG/DL (ref 9.8–20.1)
CALCIUM SERPL-MCNC: 10 MG/DL (ref 8.4–10.2)
CHLORIDE SERPL-SCNC: 103 MMOL/L (ref 98–107)
CO2 SERPL-SCNC: 28 MMOL/L (ref 23–31)
CREAT SERPL-MCNC: 0.73 MG/DL (ref 0.55–1.02)
EOSINOPHIL # BLD AUTO: 0.35 X10(3)/MCL (ref 0–0.9)
EOSINOPHIL NFR BLD AUTO: 4.2 %
ERYTHROCYTE [DISTWIDTH] IN BLOOD BY AUTOMATED COUNT: 16.6 % (ref 11.5–17)
GFR SERPLBLD CREATININE-BSD FMLA CKD-EPI: >60 MLS/MIN/1.73/M2
GLOBULIN SER-MCNC: 3.9 GM/DL (ref 2.4–3.5)
GLUCOSE SERPL-MCNC: 112 MG/DL (ref 82–115)
HCT VFR BLD AUTO: 34.1 % (ref 37–47)
HGB BLD-MCNC: 10.8 GM/DL (ref 12–16)
IMM GRANULOCYTES # BLD AUTO: 0.07 X10(3)/MCL (ref 0–0.04)
IMM GRANULOCYTES NFR BLD AUTO: 0.8 %
LYMPHOCYTES # BLD AUTO: 2.91 X10(3)/MCL (ref 0.6–4.6)
LYMPHOCYTES NFR BLD AUTO: 34.9 %
MAGNESIUM SERPL-MCNC: 2.4 MG/DL (ref 1.6–2.6)
MCH RBC QN AUTO: 32.9 PG (ref 27–31)
MCHC RBC AUTO-ENTMCNC: 31.7 MG/DL (ref 33–36)
MCV RBC AUTO: 104 FL (ref 80–94)
MONOCYTES # BLD AUTO: 0.63 X10(3)/MCL (ref 0.1–1.3)
MONOCYTES NFR BLD AUTO: 7.6 %
NEUTROPHILS # BLD AUTO: 4.3 X10(3)/MCL (ref 2.1–9.2)
NEUTROPHILS NFR BLD AUTO: 51.7 %
NRBC BLD AUTO-RTO: 0 %
PHOSPHATE SERPL-MCNC: 4 MG/DL (ref 2.3–4.7)
PLATELET # BLD AUTO: 505 X10(3)/MCL (ref 130–400)
PMV BLD AUTO: 10.1 FL (ref 7.4–10.4)
POTASSIUM SERPL-SCNC: 5.3 MMOL/L (ref 3.5–5.1)
PREALB SERPL-MCNC: 19.5 MG/DL (ref 14–37)
PROT SERPL-MCNC: 7 GM/DL (ref 5.8–7.6)
RBC # BLD AUTO: 3.28 X10(6)/MCL (ref 4.2–5.4)
SODIUM SERPL-SCNC: 141 MMOL/L (ref 136–145)
WBC # SPEC AUTO: 8.3 X10(3)/MCL (ref 4.5–11.5)

## 2022-09-05 PROCEDURE — 97110 THERAPEUTIC EXERCISES: CPT

## 2022-09-05 PROCEDURE — 84100 ASSAY OF PHOSPHORUS: CPT | Performed by: NURSE PRACTITIONER

## 2022-09-05 PROCEDURE — 25000003 PHARM REV CODE 250: Performed by: NURSE PRACTITIONER

## 2022-09-05 PROCEDURE — 11800000 HC REHAB PRIVATE ROOM

## 2022-09-05 PROCEDURE — 36415 COLL VENOUS BLD VENIPUNCTURE: CPT | Performed by: NURSE PRACTITIONER

## 2022-09-05 PROCEDURE — 25000003 PHARM REV CODE 250: Performed by: INTERNAL MEDICINE

## 2022-09-05 PROCEDURE — 97542 WHEELCHAIR MNGMENT TRAINING: CPT

## 2022-09-05 PROCEDURE — 83735 ASSAY OF MAGNESIUM: CPT | Performed by: NURSE PRACTITIONER

## 2022-09-05 PROCEDURE — 97530 THERAPEUTIC ACTIVITIES: CPT

## 2022-09-05 PROCEDURE — 84134 ASSAY OF PREALBUMIN: CPT | Performed by: NURSE PRACTITIONER

## 2022-09-05 PROCEDURE — 85025 COMPLETE CBC W/AUTO DIFF WBC: CPT | Performed by: NURSE PRACTITIONER

## 2022-09-05 PROCEDURE — 94799 UNLISTED PULMONARY SVC/PX: CPT

## 2022-09-05 PROCEDURE — 63600175 PHARM REV CODE 636 W HCPCS: Performed by: NURSE PRACTITIONER

## 2022-09-05 PROCEDURE — S4991 NICOTINE PATCH NONLEGEND: HCPCS | Performed by: NURSE PRACTITIONER

## 2022-09-05 PROCEDURE — 80053 COMPREHEN METABOLIC PANEL: CPT | Performed by: NURSE PRACTITIONER

## 2022-09-05 RX ORDER — AMLODIPINE BESYLATE 5 MG/1
5 TABLET ORAL ONCE
Status: COMPLETED | OUTPATIENT
Start: 2022-09-05 | End: 2022-09-05

## 2022-09-05 RX ORDER — CLONIDINE HYDROCHLORIDE 0.1 MG/1
0.1 TABLET ORAL ONCE
Status: COMPLETED | OUTPATIENT
Start: 2022-09-05 | End: 2022-09-05

## 2022-09-05 RX ADMIN — METHOCARBAMOL 500 MG: 500 TABLET ORAL at 07:09

## 2022-09-05 RX ADMIN — ALPRAZOLAM 1 MG: 1 TABLET ORAL at 07:09

## 2022-09-05 RX ADMIN — HYDROCODONE BITARTRATE AND ACETAMINOPHEN 1 TABLET: 5; 325 TABLET ORAL at 03:09

## 2022-09-05 RX ADMIN — ENOXAPARIN SODIUM 30 MG: 30 INJECTION SUBCUTANEOUS at 08:09

## 2022-09-05 RX ADMIN — ACETAMINOPHEN 325MG 650 MG: 325 TABLET ORAL at 05:09

## 2022-09-05 RX ADMIN — MULTIPLE VITAMINS W/ MINERALS TAB 1 TABLET: TAB at 08:09

## 2022-09-05 RX ADMIN — ACETAMINOPHEN 325MG 650 MG: 325 TABLET ORAL at 11:09

## 2022-09-05 RX ADMIN — ESCITALOPRAM OXALATE 20 MG: 20 TABLET ORAL at 08:09

## 2022-09-05 RX ADMIN — METHOCARBAMOL 500 MG: 500 TABLET ORAL at 05:09

## 2022-09-05 RX ADMIN — FOLIC ACID 1 MG: 1 TABLET ORAL at 08:09

## 2022-09-05 RX ADMIN — HYDROCODONE BITARTRATE AND ACETAMINOPHEN 1 TABLET: 5; 325 TABLET ORAL at 08:09

## 2022-09-05 RX ADMIN — DOCUSATE SODIUM 100 MG: 100 CAPSULE, LIQUID FILLED ORAL at 07:09

## 2022-09-05 RX ADMIN — CLONIDINE HYDROCHLORIDE 0.1 MG: 0.1 TABLET ORAL at 03:09

## 2022-09-05 RX ADMIN — HYDROCODONE BITARTRATE AND ACETAMINOPHEN 1 TABLET: 5; 325 TABLET ORAL at 07:09

## 2022-09-05 RX ADMIN — ENOXAPARIN SODIUM 30 MG: 30 INJECTION SUBCUTANEOUS at 07:09

## 2022-09-05 RX ADMIN — AMLODIPINE BESYLATE 5 MG: 5 TABLET ORAL at 05:09

## 2022-09-05 RX ADMIN — METHOCARBAMOL 500 MG: 500 TABLET ORAL at 02:09

## 2022-09-05 RX ADMIN — HYDROCHLOROTHIAZIDE 25 MG: 25 TABLET ORAL at 05:09

## 2022-09-05 RX ADMIN — NICOTINE 1 PATCH: 21 PATCH, EXTENDED RELEASE TRANSDERMAL at 08:09

## 2022-09-05 RX ADMIN — PANTOPRAZOLE SODIUM 40 MG: 40 TABLET, DELAYED RELEASE ORAL at 08:09

## 2022-09-05 RX ADMIN — Medication 100 MG: at 08:09

## 2022-09-05 NOTE — PT/OT/SLP PROGRESS
"Occupational Therapy Inpatient Rehab Treatment    Name: Mary Lou Sierra  MRN: [id number]    Assessment:  Mary Lou Sierra is a 71 y.o. female admitted with a medical diagnosis of S/p left hip fracture.  She presents with the following impairments/functional limitations:  weakness, impaired endurance, impaired self care skills, impaired functional mobility, gait instability, impaired balance, decreased coordination, decreased upper extremity function, decreased safety awareness, pain, edema, impaired coordination, orthopedic precautions  .    General Precautions: Standard, fall     Orthopedic Precautions:LLE weight bearing as tolerated (for transfers only)     Braces: N/A    Rehab Prognosis: Good; patient would benefit from acute skilled OT services to address these deficits and reach maximum level of function.      History:     Past Medical History:   Diagnosis Date    Hypertension        No past surgical history on file.    Subjective     Orientation: Oriented x4    Chief Complaint: "They haven't been giving me my Zanex"    Patient/Family Comments/goals: "To get to where I can do for myself"    Vitals   Vitals at Rest  /72   HR 83   O2 Sat    Pain      Vitals With Activity  /77   HR 98   O2 Sat    Pain      Respiratory Status: Room air    Patients cultural, spiritual, Jehovah's witness conflicts given the current situation: no       Objective:     Patient found up in chair with peripheral IV  upon OT entry to room.    Mobility   Patient completed:  Sit to Stand Transfer with supervision with rolling walker  Stand to Sit Transfer with supervision with rolling walker  Chair to W/C Stand Pivot technique with supervision with rolling walker    Functional Mobility  Propelled self to OT gym independently.    Limiting Factors for ADLs: motor, endurance, limited ROM, balance, weakness, safety awareness, and anxiety       Therapeutic Activities  Pt performed shoulder arc for strengthening and UE ROM in standing with " rest break between R/L with 1# wrist wts.     Therapeutic Exercise  Performed UB strengthening on UBE for 6 mins forward and 6 mins backward.  Also bunted beach ball with 1# dowel and 1# wrist wts for 4 mins with several rest breaks.     Patient left up in chair with chair alarm on.     Education provided: Roles and goals of OT, ADLs, transfer training, wheelchair precautions, safety precautions, fall prevention, and home safety    Multidisciplinary Problems       Occupational Therapy Goals          Problem: Occupational Therapy    Goal Priority Disciplines Outcome Interventions   Occupational Therapy Goal     OT, PT/OT Ongoing, Progressing    Description: Pt will perform LB dressing c AE independently  by d/c.  Pt will perform toileting Independently by d/c.  Pt will perform toilet t/f Independently by d/c.    Pt will perform shower t/f c SB assist by d/c.                         Time Tracking     OT Received On: 09/05/22  Time In 0910     Time Out 1010  Total Time 60 min  Therapy Time: OT Individual: 60  Missed Time:    Missed Time Reason:      Billable Minutes: Therapeutic Activity 30 and Therapeutic Exercise 30    09/05/2022

## 2022-09-05 NOTE — PT/OT/SLP PROGRESS
Physical Therapy      Patient Name:  Mary Lou Sierra   MRN:  [id number]    Patient not seen today secondary to Other (Comment) (Elevated BP).     Amount of therapy minutes missed:  60 Minutes.    Will follow-up as scheduling allows.    Pt scheduled from [id number]. Upon arrival, pt BP taken on RUE at 172/73 HR83 and again at 172/77 HR 85 and then again on LUE at 173/82 HR 85. Notified RN of PTs findings. Pt appears slightly more anxious this PM. RN aware.     9/5/2022

## 2022-09-05 NOTE — PT/OT/SLP PROGRESS
Physical Therapy Inpatient Rehab Treatment    Patient Name:  Mary Lou Sierra   MRN:  [id number]    Recommendations:     Discharge Recommendations:  home health PT   Discharge Equipment Recommendations: wheelchair   Barriers to discharge:  impaired functional mobility    Assessment:     Mary Lou Sierra is a 71 y.o. female admitted with a medical diagnosis of S/p left hip fracture.  She presents with the following impairments/functional limitations:    orthopedic precautions, anxiety, pain, weakness, decreased L LE ROM and function, decreased endurance.    General Precautions: Standard, fall     Orthopedic Precautions:LLE weight bearing as tolerated (Transfers only. NO AMBULATION.)     Braces: N/A    Rehab Prognosis: Good; patient would benefit from acute skilled PT services to address these deficits and reach maximum level of function.      History:     Past Medical History:   Diagnosis Date    Hypertension        No past surgical history on file.    Subjective     Chief Complaint: L LE pain, no ambulation precautions    Vitals   141/77, 88 bpm at start of session    Respiratory Status: Room air    Patients cultural, spiritual, Quaker conflicts given the current situation: no      Objective:     Communicated with pt prior to session.  Patient found up in chair with    upon PT entry to room.    Pt is Oriented x3 and Alert, Cooperative, and Motivated.    Functional Mobility:   Bed Mobility:     Supine to Sit: independence  Sit to Supine: independence  Transfers:     Sit to Stand:  modified independence with rolling walker  Bed to Chair: stand by assistance with  rolling walker  using  Stand Pivot  Wheelchair Propulsion:  Pt propelled Standard wheelchair x 150 feet on Level tile with  Bilateral upper extremity with Modified Independent.      Current   Status  Discharge   Goal   Functional Area: Care Score:    Roll Left and Right   Independent   Sit to Lying 6 Independent   Lying to Sitting on Side of Bed 6  Independent   Sit to Stand 6 Independent   Chair/Bed-to-Chair Transfer 4 Independent   Car Transfer   Independent   Walk 10 Feet   Not attempted due to medical/safety concerns   Walk 50 Feet with Two Turns   Not attempted due to medical/safety concerns   Walk 150 Feet   Not attempted due to medical/safety concerns   Walk 10 Feet Uneven Surface   Not attempted due to medical/safety concerns   1 Step (Curb)   Not attempted due to medical/safety concerns   4 Steps   Not attempted due to medical/safety concerns   12 Steps   Not attempted due to medical/safety concerns   Picking Up Object   Set-up/clean-up   Wheel 50 Feet with Two Turns 6 Independent   Wheel 150 Feet 6 Independent       Therapeutic Activities and Exercises:  3x10 modified bridges with pt pushing up from bolster under B thighs    3x10 B SLR with 3lbs on R LE and 1 lb on L LE    3x10 B supine hip abduction with 3lbs on R LE and 1lb on L LE    3x10 B seated LAQs with 3lbs on R LE and 1lb on L LE    Pt propelled w/c 150' using B LE for propulsion; increased difficulty exhibited with L LE d/t c/o tightness/pulling in L knee with increased flexion.    Activity Tolerance    Patient left up in chair with call button in reach.    Education provided: roles and goals of PT/PTA, transfer training, bed mob, safety awareness, body mechanics, assistive device, wheelchair management, strengthening exercises, and orthopedic precautions    Expected compliance:    GOALS:   Multidisciplinary Problems       Physical Therapy Goals          Problem: Physical Therapy    Goal Priority Disciplines Outcome Goal Variances Interventions   Physical Therapy Goal     PT, PT/OT Ongoing, Progressing     Description: Short Term goals      Bed Mobility   Roll Right and Left Setup or Clean-up Assistance .  Lying to sitting Setup or Clean-up Assistance .  Sitting to lying Setup or Clean-up Assistance .    Transfers    Pt will be able to perform Stand pivot  chair/bed to chair transfer With  RW Setup or Clean-up Assistance .  Pt will be able to perform Sit to stand with RW Setup or Clean-up Assistance .  Pt will be able to perform Car transfer with RW Setup or Clean-up Assistance .    Wheelchair Mobility   Pt will be able to propel 500 feet in rubens wheelchair Independent.      Timeframe: By Re-eval                         Plan:     During this hospitalization, patient to be seen 5 x/week to address the identified rehab impairments via therapeutic activities, therapeutic exercises, wheelchair management/training and progress toward the following goals:    Plan of Care Expires:  09/06/22    Additional Information:         Time Tracking:     PT Received On:    Time In 1100     Time Out 1200  Total Time 60 min  Therapy Time PT Individual: 60  Missed Time:    Time Missed due to:      Billable Minutes: Therapeutic Activity 10 min, Therapeutic Exercise 35 min, and Train/Wheelchair Management 15 min    09/05/2022

## 2022-09-05 NOTE — PROGRESS NOTES
[facility] ([facility])  Rehab Progress Note    Patient Name: Mary Lou Sierra  MRN: [id number]  Age: 71 y.o. Sex: female  : 1950  Hospital Length of Stay: 5 days  Date of Service: 2022   Chief Complaint: Left hip fracture s/p IMN on 2022    Subjective:     Basic Information  Admit Information: 71-year-old white female presented to [facility] ED on 2022 complaining of left hip pain after falling down 4 stairs.  PMH significant for HTN, ETOH dependence, nicotine dependence, and anxiety.  Workup significant for left hip fracture.  Tolerated left hip IM nailing on  out perioperative complications.  Tolerated Librium protocol, thiamine and folic acid replacement.  Orthopedic surgery recommended WBAT to E and FROM to [facility].  Tolerated transfer to [facility] inpatient rehab unit on  without incident.  Today's Information: No acute events overnight.  Sitting in chair.  Reports fair sleep.  Appetite is good.  Last BM reported .  Vital signs at goal with no recorded fevers.  Cbc unremarkable.  Potassium slightly elevated.  Repeat lab work in the morning.  No imaging today.    Review of patient's allergies indicates:  No Known Allergies     Current Facility-Administered Medications:     acetaminophen tablet 650 mg, 650 mg, Oral, Q6H, Timo A Kena, FNP, 650 mg at 22 1713    ALPRAZolam tablet 1 mg, 1 mg, Oral, Nightly PRN, Timo A Kena, FNP, 1 mg at 22 205    amLODIPine tablet 5 mg, 5 mg, Oral, Daily, Timo A Kena, FNP, 5 mg at 22 0545    benzonatate capsule 100 mg, 100 mg, Oral, TID PRN, Timo A Kena, FNP    bisacodyL suppository 10 mg, 10 mg, Rectal, Daily PRN, Timo A Kena, FNP, 10 mg at 22 115    docusate sodium capsule 100 mg, 100 mg, Oral, BID, Timo A Kena, FNP, 100 mg at 22    enoxaparin injection 30 mg, 30 mg, Subcutaneous, Q12H, ALISHA Proctor, 30 mg at 22 0832     EScitalopram oxalate tablet 20 mg, 20 mg, Oral, Daily, Timo A Kena, FNP, 20 mg at 09/05/22 0830    folic acid tablet 1 mg, 1 mg, Oral, Daily, Timo A Kena, FNP, 1 mg at 09/05/22 0829    hydrALAZINE injection 10 mg, 10 mg, Intravenous, Q4H PRN, Timo A Kena, FNP    hydroCHLOROthiazide tablet 25 mg, 25 mg, Oral, Daily, Timo A Kena, FNP, 25 mg at 09/05/22 0545    HYDROcodone-acetaminophen 5-325 mg per tablet 1 tablet, 1 tablet, Oral, Q4H PRN, Timo A Kena, FNP, 1 tablet at 09/05/22 0830    hydrOXYzine pamoate capsule 50 mg, 50 mg, Oral, Nightly PRN, Timo A Kena, FNP    labetalol 20 mg/4 mL (5 mg/mL) IV syring, 10 mg, Intravenous, Q4H PRN, Timo A Kena, FNP    melatonin tablet 6 mg, 6 mg, Oral, Nightly PRN, Timo A Kena, FNP    methocarbamoL tablet 500 mg, 500 mg, Oral, TID, Timo A Kena, FNP, 500 mg at 09/05/22 0545    metoprolol injection 10 mg, 10 mg, Intravenous, Q2H PRN, Timo A Kena, FNP    multivitamin tablet, 1 tablet, Oral, Daily, Timo A Kena, FNP, 1 tablet at 09/05/22 0829    nicotine 21 mg/24 hr 1 patch, 1 patch, Transdermal, Daily, Timo A Kena, FNP, 1 patch at 09/05/22 0831    nitroGLYCERIN SL tablet 0.4 mg, 0.4 mg, Sublingual, Q5 Min PRN, Timo A Kena, FNP    ondansetron disintegrating tablet 4 mg, 4 mg, Oral, Q6H PRN, Timo A Kena, FNP    pantoprazole EC tablet 40 mg, 40 mg, Oral, Daily, Timo A Kena, FNP, 40 mg at 09/05/22 0830    polyethylene glycol packet 17 g, 17 g, Oral, BID PRN, Timo A Kena, FNP, 17 g at 09/03/22 0707    polyethylene glycol packet 17 g, 17 g, Oral, Daily, Timo Marcelinoart, FNP, 17 g at 09/04/22 0823    promethazine tablet 25 mg, 25 mg, Oral, Q6H PRN, Timo Marcelinoart, FNP    thiamine tablet 100 mg, 100 mg, Oral, Daily, Timo Escudero, FNP, 100 mg at 09/05/22 0829     Review of Systems   Complete 12-point review of symptoms negative except for what's  "mentioned in HPI     Objective:     BP (!) 172/73   Pulse 73   Temp 97.5 °F (36.4 °C)   Resp 18   Ht 5' 2.5" (1.588 m)   Wt 72.3 kg (159 lb 6.3 oz)   SpO2 99%   BMI 28.69 kg/m²        Intake/Output Summary (Last 24 hours) at 9/5/2022 1005  Last data filed at 9/4/2022 1200  Gross per 24 hour   Intake 160 ml   Output --   Net 160 ml       Physical Exam  Constitutional:       Appearance: Normal appearance.   HENT:      Head: Normocephalic.      Mouth/Throat:      Mouth: Mucous membranes are moist.   Eyes:      Pupils: Pupils are equal, round, and reactive to light.   Cardiovascular:      Rate and Rhythm: Normal rate and regular rhythm.      Heart sounds: Normal heart sounds.      Comments: 2/6 murmur LCW, 5th ICS, MCL.  LLE 1+ edema  Pulmonary:      Effort: Pulmonary effort is normal.      Breath sounds: Normal breath sounds.   Abdominal:      General: Bowel sounds are normal.      Palpations: Abdomen is soft.   Musculoskeletal:      Cervical back: Neck supple.      Comments: Generalized weakness and muscle atrophy. Left hip dressing clean and intact    Skin:     General: Skin is warm and dry.   Neurological:      General: No focal deficit present.      Mental Status: She is alert and oriented to person, place, and time.   Psychiatric:         Mood and Affect: Mood normal.         Behavior: Behavior normal.         Thought Content: Thought content normal.         Judgment: Judgment normal.   *MD performed and documented physical examination         Lines/Drains/Airways       None                 Labs  Recent Results (from the past 24 hour(s))   Prealbumin    Collection Time: 09/05/22  5:41 AM   Result Value Ref Range    Prealbumin 19.5 14.0 - 37.0 mg/dL   Comprehensive Metabolic Panel    Collection Time: 09/05/22  5:41 AM   Result Value Ref Range    Sodium Level 141 136 - 145 mmol/L    Potassium Level 5.3 (H) 3.5 - 5.1 mmol/L    Chloride 103 98 - 107 mmol/L    Carbon Dioxide 28 23 - 31 mmol/L    Glucose Level " 112 82 - 115 mg/dL    Blood Urea Nitrogen 9.8 9.8 - 20.1 mg/dL    Creatinine 0.73 0.55 - 1.02 mg/dL    Calcium Level Total 10.0 8.4 - 10.2 mg/dL    Protein Total 7.0 5.8 - 7.6 gm/dL    Albumin Level 3.1 (L) 3.4 - 4.8 gm/dL    Globulin 3.9 (H) 2.4 - 3.5 gm/dL    Albumin/Globulin Ratio 0.8 (L) 1.1 - 2.0 ratio    Bilirubin Total 0.8 <=1.5 mg/dL    Alkaline Phosphatase 166 (H) 40 - 150 unit/L    Alanine Aminotransferase 36 0 - 55 unit/L    Aspartate Aminotransferase 34 5 - 34 unit/L    eGFR >60 mls/min/1.73/m2   Magnesium    Collection Time: 09/05/22  5:41 AM   Result Value Ref Range    Magnesium Level 2.40 1.60 - 2.60 mg/dL   Phosphorus    Collection Time: 09/05/22  5:41 AM   Result Value Ref Range    Phosphorus Level 4.0 2.3 - 4.7 mg/dL   CBC with Differential    Collection Time: 09/05/22  5:41 AM   Result Value Ref Range    WBC 8.3 4.5 - 11.5 x10(3)/mcL    RBC 3.28 (L) 4.20 - 5.40 x10(6)/mcL    Hgb 10.8 (L) 12.0 - 16.0 gm/dL    Hct 34.1 (L) 37.0 - 47.0 %    .0 (H) 80.0 - 94.0 fL    MCH 32.9 (H) 27.0 - 31.0 pg    MCHC 31.7 (L) 33.0 - 36.0 mg/dL    RDW 16.6 11.5 - 17.0 %    Platelet 505 (H) 130 - 400 x10(3)/mcL    MPV 10.1 7.4 - 10.4 fL    Neut % 51.7 %    Lymph % 34.9 %    Mono % 7.6 %    Eos % 4.2 %    Basophil % 0.8 %    Lymph # 2.91 0.6 - 4.6 x10(3)/mcL    Neut # 4.3 2.1 - 9.2 x10(3)/mcL    Mono # 0.63 0.1 - 1.3 x10(3)/mcL    Eos # 0.35 0 - 0.9 x10(3)/mcL    Baso # 0.07 0 - 0.2 x10(3)/mcL    IG# 0.07 (H) 0 - 0.04 x10(3)/mcL    IG% 0.8 %    NRBC% 0.0 %       Radiology  Left femur XR two view on 08/24/2022 at 12:41 p.m., IMPRESSION: There has been interval insertion of an intramedullary alva traversing a fracture of the midshaft and distal aspect of the femur position and alignment of the visualized osseous structures is anatomical multiple orthopedic screws identified in the distal femur    Assessment/Plan:     71 y.o. WF admitted on 8/31/2022    Left hip fracture  - s/p left hip IMN on 8/24/2022  - continue                 Norco 5 mg/325 mg q.4 hours p.r.n.                 Lovenox 30 mg q.12 hours                     Robaxin 500 mg t.i.d.                 Multivitamin daily                 Tylenol 650 mg q.6 hours  - defer to physiatry for rehab and pain management  - PT/OT/RT following     HTN  - BP at goal!!  - continue  HCTZ 25 mg daily (initiated 9/3)   Norvasc 5 mg daily (initiated 9/2)               Hydralazine 10 mg every 2 hours as needed for BP > 160/90               Labetalol 10 mg every 2 hours as needed for BP > 160/90     Normocytic anemia  - asymptomatic  - H/H stable   - no evidence of active bleeds  - will closely monitor and transfuse if needed      Anxiety  - stable   - continue                Lexapro 20 mg daily                Xanax 1 mg at bedtime p.r.n.     Insomnia  - improved  - continue                 Melatonin 6 mg at bedtime p.r.n.     Constipation  - stable  - continue     MiraLax 17 g daily (initiated 9/3)                Colace 100 mg b.i.d.      GERD  - Avoid spicy foods, and nothing to eat or drink within x2 hours of bedtime or laying flat (water is ok)   - Avoid NSAIDs (Advil, ibuprofen, naproxen...) and mcdonnell-2 inhibitors (Mobic, Celebrex)    - continue               Protonix 40 mg daily      ETOH dependence  - stable   - completed Librium protocol  - continue     Multivitamin daily                Folic acid 1 mg daily                Thiamine 100 mg daily     Nicotine dependence   - smoking cessation counseling given on admission  - continue                Nicotine 21 mg patch q.day (initiated 9/1)     VTE Prophylaxis:  Lovenox 30 mg b.i.d.  COVID-19 testing:  Negative on 08/31/2022  COVID-19 vaccination status:  Unvaccinated     POA: Lesly (sister)  Living will: Yes  Contacts: Lesly Sierra (sister) [phone]     CODE STATUS: DNR  Internal Medicine (attending): Luis Singh MD  Physiatry (consulting):  Leo Parada MD     OUTPATIENT PROVIDERS  PCP: ALISHA Asher  Orthopedic  surgery:  Silverio Vaughn MD     DISPOSITION: Condition stable.  Sleep hygiene fair. Appetite at goal.  Last BM 9/4.  Vital signs at goal with no recorded fevers.  No labs or imaging today.  BP over our goal, slightly elevated this morning.  Continue aggressive mobilization as tolerated.  Monitor closely.  Notify of  acute changes.      Iftikhar Escudero NP conducted independent physical examination and assisted with medical documentation.    Total time spent on this encounter including chart review and direct MD + NP 1-on-1 patient interaction: 42 minutes   Over 50% of this time was spent in counseling and coordination of care

## 2022-09-06 LAB
ANION GAP SERPL CALC-SCNC: 12 MEQ/L
BUN SERPL-MCNC: 12.9 MG/DL (ref 9.8–20.1)
CALCIUM SERPL-MCNC: 10 MG/DL (ref 8.4–10.2)
CHLORIDE SERPL-SCNC: 100 MMOL/L (ref 98–107)
CO2 SERPL-SCNC: 29 MMOL/L (ref 23–31)
CREAT SERPL-MCNC: 0.71 MG/DL (ref 0.55–1.02)
CREAT/UREA NIT SERPL: 18
GFR SERPLBLD CREATININE-BSD FMLA CKD-EPI: >60 MLS/MIN/1.73/M2
GLUCOSE SERPL-MCNC: 102 MG/DL (ref 82–115)
POTASSIUM SERPL-SCNC: 4.7 MMOL/L (ref 3.5–5.1)
SODIUM SERPL-SCNC: 141 MMOL/L (ref 136–145)

## 2022-09-06 PROCEDURE — 97542 WHEELCHAIR MNGMENT TRAINING: CPT

## 2022-09-06 PROCEDURE — 25000003 PHARM REV CODE 250: Performed by: NURSE PRACTITIONER

## 2022-09-06 PROCEDURE — 11800000 HC REHAB PRIVATE ROOM

## 2022-09-06 PROCEDURE — 80048 BASIC METABOLIC PNL TOTAL CA: CPT | Performed by: NURSE PRACTITIONER

## 2022-09-06 PROCEDURE — 97164 PT RE-EVAL EST PLAN CARE: CPT

## 2022-09-06 PROCEDURE — 97530 THERAPEUTIC ACTIVITIES: CPT

## 2022-09-06 PROCEDURE — 97110 THERAPEUTIC EXERCISES: CPT

## 2022-09-06 PROCEDURE — 97535 SELF CARE MNGMENT TRAINING: CPT

## 2022-09-06 PROCEDURE — S4991 NICOTINE PATCH NONLEGEND: HCPCS | Performed by: NURSE PRACTITIONER

## 2022-09-06 PROCEDURE — 36415 COLL VENOUS BLD VENIPUNCTURE: CPT | Performed by: NURSE PRACTITIONER

## 2022-09-06 PROCEDURE — 94799 UNLISTED PULMONARY SVC/PX: CPT

## 2022-09-06 PROCEDURE — 63600175 PHARM REV CODE 636 W HCPCS: Performed by: NURSE PRACTITIONER

## 2022-09-06 RX ORDER — AMLODIPINE BESYLATE 5 MG/1
10 TABLET ORAL DAILY
Status: DISCONTINUED | OUTPATIENT
Start: 2022-09-06 | End: 2022-09-09 | Stop reason: HOSPADM

## 2022-09-06 RX ADMIN — POLYETHYLENE GLYCOL 3350 17 G: 17 POWDER, FOR SOLUTION ORAL at 08:09

## 2022-09-06 RX ADMIN — HYDROCODONE BITARTRATE AND ACETAMINOPHEN 1 TABLET: 5; 325 TABLET ORAL at 11:09

## 2022-09-06 RX ADMIN — ALPRAZOLAM 1 MG: 1 TABLET ORAL at 08:09

## 2022-09-06 RX ADMIN — METHOCARBAMOL 500 MG: 500 TABLET ORAL at 02:09

## 2022-09-06 RX ADMIN — HYDROCODONE BITARTRATE AND ACETAMINOPHEN 1 TABLET: 5; 325 TABLET ORAL at 08:09

## 2022-09-06 RX ADMIN — AMLODIPINE BESYLATE 10 MG: 5 TABLET ORAL at 08:09

## 2022-09-06 RX ADMIN — METHOCARBAMOL 500 MG: 500 TABLET ORAL at 06:09

## 2022-09-06 RX ADMIN — ACETAMINOPHEN 325MG 650 MG: 325 TABLET ORAL at 06:09

## 2022-09-06 RX ADMIN — NICOTINE 1 PATCH: 21 PATCH, EXTENDED RELEASE TRANSDERMAL at 08:09

## 2022-09-06 RX ADMIN — PANTOPRAZOLE SODIUM 40 MG: 40 TABLET, DELAYED RELEASE ORAL at 08:09

## 2022-09-06 RX ADMIN — ESCITALOPRAM OXALATE 20 MG: 20 TABLET ORAL at 08:09

## 2022-09-06 RX ADMIN — HYDROCHLOROTHIAZIDE 25 MG: 25 TABLET ORAL at 08:09

## 2022-09-06 RX ADMIN — MULTIPLE VITAMINS W/ MINERALS TAB 1 TABLET: TAB at 08:09

## 2022-09-06 RX ADMIN — METHOCARBAMOL 500 MG: 500 TABLET ORAL at 09:09

## 2022-09-06 RX ADMIN — Medication 100 MG: at 08:09

## 2022-09-06 RX ADMIN — ACETAMINOPHEN 325MG 650 MG: 325 TABLET ORAL at 05:09

## 2022-09-06 RX ADMIN — DOCUSATE SODIUM 100 MG: 100 CAPSULE, LIQUID FILLED ORAL at 08:09

## 2022-09-06 RX ADMIN — FOLIC ACID 1 MG: 1 TABLET ORAL at 08:09

## 2022-09-06 RX ADMIN — ENOXAPARIN SODIUM 30 MG: 30 INJECTION SUBCUTANEOUS at 08:09

## 2022-09-06 NOTE — PLAN OF CARE
Spoke with pt throughout the day re: her discharge plans.  She initially said her friend, Vida, could contact their friend, Taras, to move her  mother's ramp to her own home and could perhaps have it done by Thurs, , the day she would prefer to go home (since it is her birthday that day).  She later told me that she was upset with her sister, Lesly, who initially asked why she could not just stay in the hospital another week or until the ramp is installed.  Pt has now decided to reach out to her friend, Latonia Parada, who lives on [address] in [place].  She feels Latonia and her  would welcome her there until her ramp is complete.  She usually stays weekends at their home anyway.  She will discuss with Latonia collins and let me know tomorrow.   She will obtain Nidiamichaela's address if so, so that HH can be arranged at her home instead of pt's home for now.  She will also ask if Latonia can arrange to have pt's low vehicle available for transport of pt to Latonia's home.      Will order DME tomorrow am, since PT note will not be ready until later today.

## 2022-09-06 NOTE — PT/OT/SLP PROGRESS
Physical Therapy Inpatient Rehab Treatment    Patient Name:  Mary Lou Sierra   MRN:  [id number]    Recommendations:     Discharge Recommendations:  home health PT   Discharge Equipment Recommendations: wheelchair   Barriers to discharge:  impaired functional mobility    Assessment:     Mary Lou Sierra is a 71 y.o. female admitted with a medical diagnosis of S/p left hip fracture.  She presents with the following impairments/functional limitations:    orthopedic precautions, weakness, anxiety, pain.    General Precautions: Standard, fall     Orthopedic Precautions:LLE weight bearing as tolerated (Transfers only. NO AMBULATION.)     Braces: N/A    Rehab Prognosis: Good; patient would benefit from acute skilled PT services to address these deficits and reach maximum level of function.      History:     Past Medical History:   Diagnosis Date    Hypertension        No past surgical history on file.    Subjective     Chief Complaint: L LE pain    Vitals   134/74, 93 bpm at start of session    Respiratory Status: Room air    Patients cultural, spiritual, Holiness conflicts given the current situation: no      Objective:     Communicated with pt prior to session.  Patient found up in chair with    upon PT entry to room.    Pt is Oriented x3 and Alert, Cooperative, and Motivated.    Functional Mobility:   Transfers:     Sit to Stand:  modified independence with rolling walker  Bed to Chair: supervision with  rolling walker  using  Stand Pivot  Wheelchair Propulsion:  Pt propelled Standard wheelchair x 150 feet on Level tile with  Bilateral upper extremity with Modified Independent.      Current   Status  Discharge   Goal   Functional Area: Care Score:    Roll Left and Right   Independent   Sit to Lying   Independent   Lying to Sitting on Side of Bed   Independent   Sit to Stand 6 Independent   Chair/Bed-to-Chair Transfer 4 Independent   Car Transfer   Independent   Walk 10 Feet   Not attempted due to medical/safety  concerns   Walk 50 Feet with Two Turns   Not attempted due to medical/safety concerns   Walk 150 Feet   Not attempted due to medical/safety concerns   Walk 10 Feet Uneven Surface   Not attempted due to medical/safety concerns   1 Step (Curb)   Not attempted due to medical/safety concerns   4 Steps   Not attempted due to medical/safety concerns   12 Steps   Not attempted due to medical/safety concerns   Picking Up Object   Set-up/clean-up   Wheel 50 Feet with Two Turns 6 Independent   Wheel 150 Feet 6 Independent       Therapeutic Activities and Exercises:  L LE knee A/AA/PROM to improve knee flexion. Measurements taken at end of pts tolerance throughout: 94 degrees > 103 degrees > 109 degrees > 110 degrees.    3x10 B LAQs and marches with 4lbs R LE and 2lbs L LE      Activity Tolerance    Patient left up in chair with call button in reach.    Education provided: roles and goals of PT/PTA, transfer training, safety awareness, body mechanics, assistive device, wheelchair management, and strengthening exercises    Expected compliance:    GOALS:   Multidisciplinary Problems       Physical Therapy Goals          Problem: Physical Therapy    Goal Priority Disciplines Outcome Goal Variances Interventions   Physical Therapy Goal     PT, PT/OT Ongoing, Progressing     Description: Short Term goals      Bed Mobility   Roll Right and Left Setup or Clean-up Assistance .  Lying to sitting Setup or Clean-up Assistance .  Sitting to lying Setup or Clean-up Assistance .    Transfers    Pt will be able to perform Stand pivot  chair/bed to chair transfer With RW Setup or Clean-up Assistance .  Pt will be able to perform Sit to stand with RW Setup or Clean-up Assistance .  Pt will be able to perform Car transfer with RW Setup or Clean-up Assistance .    Wheelchair Mobility   Pt will be able to propel 500 feet in rubens wheelchair Independent.      Timeframe: By Re-eval                         Plan:     During this hospitalization,  patient to be seen 5 x/week to address the identified rehab impairments via therapeutic activities, therapeutic exercises, wheelchair management/training and progress toward the following goals:    Plan of Care Expires:  09/06/22    Additional Information:         Time Tracking:     PT Received On:    Time In 1100     Time Out 1200  Total Time 60 min  Therapy Time PT Individual: 60  Missed Time:    Time Missed due to:      Billable Minutes: Therapeutic Activity 5 min, Therapeutic Exercise 45 min, and Train/Wheelchair Management 10 min    09/06/2022

## 2022-09-06 NOTE — PT/OT/SLP PROGRESS
Occupational Therapy Inpatient Rehab Treatment    Name: Mary Lou Sierra  MRN: [id number]    Assessment:  Mary Lou Sierra is a 71 y.o. female admitted with a medical diagnosis of S/p left hip fracture.  She presents with the following impairments/functional limitations: weakness, impaired endurance, impaired self care skills, impaired functional mobility, gait instability, impaired balance, decreased coordination, decreased upper extremity function, decreased safety awareness, pain, edema, impaired coordination, orthopedic precautions  .   General Precautions: Standard, fall     Orthopedic Precautions:LLE weight bearing as tolerated (for transfers only)     Braces: N/A    Rehab Prognosis: Good; patient would benefit from acute skilled OT services to address these deficits and reach maximum level of function.      History:     Past Medical History:   Diagnosis Date    Hypertension        No past surgical history on file.    Subjective     Orientation: Oriented x4    Chief Complaint: no complaints or concerns reported    Patient/Family Comments/goals: patient would like to get back home.    Vitals   Vitals at Rest  /86 (Carlotta POWELL notified, cleared to see)   HR 87   O2 Sat    Pain      Vitals With Activity  /82  (Carlotta POWELL notified of BP after therapy)   HR 96   O2 Sat    Pain      Respiratory Status: Room air    Patients cultural, spiritual, Druze conflicts given the current situation: no       Objective:     Patient found up in chair with    upon OT entry to room.    Mobility   Patient completed:  Sit to Stand Transfer with stand by assistance with rolling walker and gait belt.    Functional Mobility  Patient wheeled self in wheelchair from room 406 to ADL gym and back.                   ADLs   Current Status   Eating     Oral Hygiene 6   Shower, Bathe Self     Upper Body Dressing     Lower Body Dressing     Toileting Hygiene     Toilet Transfer     Putting On, Taking Off Footwear        Limiting Factors for ADLs: endurance, limited ROM, safety awareness, and post op precautions.      Therapeutic Activities  Patient performed clothespins seated in wheelchair for pinch strengthening.  Patient placed pin onto dowels with right hand and removed with left hand.     Therapeutic Exercise  UBE 6 minutes forward and 6 minutes in reverse seated in wheelchair.    Additional Treatments: patient education regarding home set up once discharged, safety awareness maneuvering within the home environment.     LifeStyle Change and Education:             Patient left up in chair with call button in reach.  RN notified of BP.    Education provided: Roles and goals of OT, ADLs, body mechanics, safety precautions, fall prevention, and post-op precautions    Multidisciplinary Problems       Occupational Therapy Goals          Problem: Occupational Therapy    Goal Priority Disciplines Outcome Interventions   Occupational Therapy Goal     OT, PT/OT Ongoing, Progressing    Description: Pt will perform LB dressing c AE independently  by d/c.  Pt will perform toileting Independently by d/c.  Pt will perform toilet t/f Independently by d/c.    Pt will perform shower t/f c SB assist by d/c.                         Time Tracking     OT Received On: 09/06/22  Time In 0910     Time Out 1010  Total Time 60 min  Therapy Time: OT Individual: 60  Missed Time:    Missed Time Reason:      Billable Minutes: Self Care/Home Management 15, Therapeutic Activity 30, and Therapeutic Exercise 15    09/06/2022

## 2022-09-06 NOTE — PLAN OF CARE
Sent orders for  PT/OT/nursing to [facility] via [facility].  Relayed discharge planned for either 9/8 or 9/9, pending ramp/decision.    Will order DME once w/c measurement and PT note are completed.

## 2022-09-06 NOTE — PT/OT/SLP EVAL
Physical Therapy Rehab Re-Evaluation    Patient Name:  Mary Lou Sierra   MRN:  [id number]    Recommendations:     Discharge Recommendations:  home health PT   Discharge Equipment Recommendations: wheelchair   Barriers to discharge: None    Assessment:     Mary Lou Sierra is a 71 y.o. female admitted with a medical diagnosis of S/p left hip fracture.  She presents with the following impairments/functional limitations:  weakness, impaired endurance, impaired self care skills, impaired functional mobility, gait instability, impaired balance, decreased lower extremity function, pain, orthopedic precautions .    Rehab Diagnosis:     Recent Surgery: * No surgery found *      General Precautions: Standard, fall     Orthopedic Precautions: LLE weight bearing as tolerated (for transfers only)     Braces: N/A    Rehab Prognosis: Good; patient would benefit from acute skilled PT services to address these deficits and reach maximum level of function.      History:     Past Medical History:   Diagnosis Date    Hypertension        No past surgical history on file.    Subjective     Chief Complaint: Aching pain in L hip  Patient/Family Comments/goals: N/A    Vitals   Vitals at Rest  BP    HR    O2 Sat     Pain Pain Rating 1: 5/10  Location - Side 1: Left  Location - Orientation 1: upper  Location 1: hip  Pain Addressed 1: Reposition     Vitals With Activity  BP      HR    O2 Sat     Pain      Respiratory Status: Room air    Patients cultural, spiritual, Scientologist conflicts given the current situation:         Living Environment  Lives With: alone  Number of Stairs, Main Entrance: four  Stair Railings, Main Entrance: railings on both sides of stairs    Prior Level of Function       Equipment used at home:  .  DME owned (not currently used): none.      Upon discharge, patient will have assistance from friends and family.    Objective:     Communicated with RN prior to session.  Patient found up in chair with peripheral IV  upon  PT entry to room.    Pt is oriented x 3. Pt is alert, cooperative, and overwhelmed 2/2 to familial issues and living arrangements following D/C. Pt spoke with Inez pertaining to living arrangements and transportation home.    Functional Mobility      GGs   Current   Status    Functional Area: Care Score:    Roll Left and Right 6    Sit to Lying 6    Lying to Sitting on Side of Bed 6    Sit to Stand 6 With Rw   Chair/Bed-to-Chair Transfer 6 With RW using stand pivot t/f with WBAT to LLE; Pt able to maintain precautions safely   Car Transfer 4 With RW using stand pivot t/f; Overall supervision; VC for wheelchair management and sequencing   Walk 10 Feet 88    Walk 50 Feet with Two Turns 88    Walk 150 Feet 88    Walk 10 Feet Uneven Surface 88    1 Step (Curb) 88    4 Steps 88    12 Steps 88    Picking Up Object 4 With RW; overall supervision; Pt required VC for w/c management and sequencing.   Wheel 50 Feet with Two Turns 6    Wheel 150 Feet 6 ~500 ft with BUE; Pt demonstrated inmproved endurance, UE strength, and coordination     Therapeutic Activities and Exercises:  Toilet t/f to bedside commode with RW using stand pivot t/f; overall Independent; + void      Activity Tolerance Good    Patient left up in chair with all lines intact, call button in reach, and chair alarm on.    Education Provided: roles and goals of PT/PTA, transfer training, bed mob, safety awareness, assistive device, wheelchair management, and fall prevention    Expected compliance: High compliance    GOALS:   Multidisciplinary Problems       Physical Therapy Goals          Problem: Physical Therapy    Goal Priority Disciplines Outcome Goal Variances Interventions   Physical Therapy Goal     PT, PT/OT Ongoing, Progressing     Description: Short Term goals      Bed Mobility   Roll Right and Left Setup or Clean-up Assistance . (MET)  Lying to sitting Setup or Clean-up Assistance . (MET)  Sitting to lying Setup or Clean-up Assistance . (MET)  Roll  left and right independently  Sitting to lying independently  Lying to sitting independently    Transfers    Pt will be able to perform Stand pivot  chair/bed to chair transfer With RW Setup or Clean-up Assistance . (MET)  Pt will be able to perform Sit to stand with RW Setup or Clean-up Assistance . (MET)  Pt will be able to perform Car transfer with RW Setup or Clean-up Assistance .  Pt will be able to perform stand pivot chair/bed to chair transfer with RW independently  Sit to stand transfer independently using RW     Wheelchair Mobility   Pt will be able to propel 500 feet in rbuens wheelchair Independent. (MET)  Manual wheelchair 1000 feet independently using Bilateral upper extremity       Timeframe: By Re-eval                       Pt would benefit from RW for safety with transfers and standing balance due to precautions for WBAT to LLE with t/fs only (NO AMBULATION), decreased UE strength, and decreased endurance.     Pt would also benefit from w/c for locomotion and safety due to LLE precautions, decreased LLE strength, decreased UE strength, and decreased endurance. Pt is unable to ambulate at this time and will require W/C for main mode of mobility. Pt is able to propel wheelchair independently.       Plan:     During this hospitalization, patient to be seen 5 x/week to address the identified rehab impairments via therapeutic activities, therapeutic exercises, wheelchair management/training and progress toward the following goals:    Plan of Care Expires:  09/06/22    Time Tracking:     PT Received On: 09/06/22  Time In 1300     Time Out 1430  Total Time 90 min  Therapy Time PT Received On: 09/06/22  PT Start Time: 1300  PT Stop Time: 1430  PT Total Time (min): 90 min  PT Individual: 90      Billable Minutes: Re-eval 30 and Therapeutic Activity 60    09/06/2022

## 2022-09-06 NOTE — PROGRESS NOTES
[facility] ([facility])  Rehab Progress Note    Patient Name: Mary Lou Sierra  MRN: [id number]  Age: 71 y.o. Sex: female  : 1950  Hospital Length of Stay: 6 days  Date of Service: 2022   Chief Complaint: Left hip fracture s/p IMN on 2022    Subjective:     Basic Information  Admit Information: 71-year-old white female presented to [facility] ED on 2022 complaining of left hip pain after falling down 4 stairs.  PMH significant for HTN, ETOH dependence, nicotine dependence, and anxiety.  Workup significant for left hip fracture.  Tolerated left hip IM nailing on  out perioperative complications.  Tolerated Librium protocol, thiamine and folic acid replacement.  Orthopedic surgery recommended WBAT to E and FROM to [facility].  Tolerated transfer to [facility] inpatient rehab unit on  without incident.  Today's Information: No acute events overnight.  Sitting in chair.  Appetite is good.  Last BM .  Reports good sleep.  BP moderately controlled.  BMP unremarkable.  No imaging today.      Review of patient's allergies indicates:  No Known Allergies     Current Facility-Administered Medications:     acetaminophen tablet 650 mg, 650 mg, Oral, Q6H, Timo Brisenoehart, FNP, 650 mg at 22 0600    ALPRAZolam tablet 1 mg, 1 mg, Oral, Nightly PRN, Timo Brisenoehart, FNP, 1 mg at 22    amLODIPine tablet 10 mg, 10 mg, Oral, Daily, Timo Brisenoehart, FNP    benzonatate capsule 100 mg, 100 mg, Oral, TID PRN, Timo Brisenoehart, FNP    bisacodyL suppository 10 mg, 10 mg, Rectal, Daily PRN, Timo RONQUILLO Kena, FNP, 10 mg at 22    docusate sodium capsule 100 mg, 100 mg, Oral, BID, Timo Brisenoehart, FNP, 100 mg at 22    enoxaparin injection 30 mg, 30 mg, Subcutaneous, Q12H, Timo Escudero, FNP, 30 mg at 22    EScitalopram oxalate tablet 20 mg, 20 mg, Oral, Daily, ALISHA Proctor, 20 mg at 22 0830    folic  "acid tablet 1 mg, 1 mg, Oral, Daily, Timo A Kena, FNP, 1 mg at 09/05/22 0829    hydrALAZINE injection 10 mg, 10 mg, Intravenous, Q4H PRN, Timo A Kena, FNP    hydroCHLOROthiazide tablet 25 mg, 25 mg, Oral, Daily, Timo A Kena, FNP, 25 mg at 09/05/22 0545    HYDROcodone-acetaminophen 5-325 mg per tablet 1 tablet, 1 tablet, Oral, Q4H PRN, Timo A Kena, FNP, 1 tablet at 09/05/22 1950    hydrOXYzine pamoate capsule 50 mg, 50 mg, Oral, Nightly PRN, Timo A Kena, FNP    labetalol 20 mg/4 mL (5 mg/mL) IV syring, 10 mg, Intravenous, Q4H PRN, Timo A Kena, FNP    melatonin tablet 6 mg, 6 mg, Oral, Nightly PRN, Timo A Kena, FNP    methocarbamoL tablet 500 mg, 500 mg, Oral, TID, Timo A Kena, FNP, 500 mg at 09/06/22 0600    metoprolol injection 10 mg, 10 mg, Intravenous, Q2H PRN, Timo A Kena, FNP    multivitamin tablet, 1 tablet, Oral, Daily, Timo A Kena, FNP, 1 tablet at 09/05/22 0829    nicotine 21 mg/24 hr 1 patch, 1 patch, Transdermal, Daily, Timo A Kena, FNP, 1 patch at 09/05/22 0831    nitroGLYCERIN SL tablet 0.4 mg, 0.4 mg, Sublingual, Q5 Min PRN, Timo A Kena, FNP    ondansetron disintegrating tablet 4 mg, 4 mg, Oral, Q6H PRN, Timo A Kena, FNP    pantoprazole EC tablet 40 mg, 40 mg, Oral, Daily, Timo A Kena, FNP, 40 mg at 09/05/22 0830    polyethylene glycol packet 17 g, 17 g, Oral, BID PRN, Timo A Kena, FNP, 17 g at 09/03/22 0707    polyethylene glycol packet 17 g, 17 g, Oral, Daily, Timo A Kena, FNP, 17 g at 09/04/22 0823    promethazine tablet 25 mg, 25 mg, Oral, Q6H PRN, Timo YG ALISHA Escudero    thiamine tablet 100 mg, 100 mg, Oral, Daily, Timo YG Kena, FNP, 100 mg at 09/05/22 0829     Review of Systems   Complete 12-point review of symptoms negative except for what's mentioned in HPI     Objective:     /79   Pulse 73   Temp 97.7 °F (36.5 °C) (Oral)   Resp 18   Ht 5' 2.5" " (1.588 m)   Wt 72.3 kg (159 lb 6.3 oz)   SpO2 99%   BMI 28.69 kg/m²        Intake/Output Summary (Last 24 hours) at 9/6/2022 0851  Last data filed at 9/5/2022 1700  Gross per 24 hour   Intake 360 ml   Output --   Net 360 ml       Physical Exam  Constitutional:       Appearance: Normal appearance.   HENT:      Head: Normocephalic.      Mouth/Throat:      Mouth: Mucous membranes are moist.   Eyes:      Pupils: Pupils are equal, round, and reactive to light.   Cardiovascular:      Rate and Rhythm: Normal rate and regular rhythm.      Heart sounds: Normal heart sounds.      Comments: 2/6 murmur LCW, 5th ICS, MCL.  LLE 1+ edema  Pulmonary:      Effort: Pulmonary effort is normal.      Breath sounds: Normal breath sounds.   Abdominal:      General: Bowel sounds are normal.      Palpations: Abdomen is soft.   Musculoskeletal:      Cervical back: Neck supple.      Comments: Generalized weakness and muscle atrophy. Left hip dressing clean and intact    Skin:     General: Skin is warm and dry.   Neurological:      General: No focal deficit present.      Mental Status: She is alert and oriented to person, place, and time.   Psychiatric:         Mood and Affect: Mood normal.         Behavior: Behavior normal.         Thought Content: Thought content normal.         Judgment: Judgment normal.   *MD performed and documented physical examination       Lines/Drains/Airways       None                 Labs  Recent Results (from the past 24 hour(s))   Basic Metabolic Panel    Collection Time: 09/06/22  5:10 AM   Result Value Ref Range    Sodium Level 141 136 - 145 mmol/L    Potassium Level 4.7 3.5 - 5.1 mmol/L    Chloride 100 98 - 107 mmol/L    Carbon Dioxide 29 23 - 31 mmol/L    Glucose Level 102 82 - 115 mg/dL    Blood Urea Nitrogen 12.9 9.8 - 20.1 mg/dL    Creatinine 0.71 0.55 - 1.02 mg/dL    BUN/Creatinine Ratio 18     Calcium Level Total 10.0 8.4 - 10.2 mg/dL    Anion Gap 12.0 mEq/L    eGFR >60 mls/min/1.73/m2        Radiology  Left femur XR two view on 08/24/2022 at 12:41 p.m., IMPRESSION: There has been interval insertion of an intramedullary alva traversing a fracture of the midshaft and distal aspect of the femur position and alignment of the visualized osseous structures is anatomical multiple orthopedic screws identified in the distal femur    Assessment/Plan:     71 y.o. WF admitted on 8/31/2022    Left hip fracture  - s/p left hip IMN on 8/24/2022  - remove staples on 09/06  - continue                Norco 5 mg/325 mg q.4 hours p.r.n.                 Lovenox 30 mg q.12 hours                     Robaxin 500 mg t.i.d.                 Multivitamin daily                 Tylenol 650 mg q.6 hours  - defer to physiatry for rehab and pain management  - PT/OT/RT following     HTN  - BP mod control  - continue  HCTZ 25 mg daily (initiated 9/3)   Norvasc 10 mg daily (increased 9/6)               Hydralazine 10 mg every 2 hours as needed for BP > 160/90               Labetalol 10 mg every 2 hours as needed for BP > 160/90     Normocytic anemia  - asymptomatic  - H/H stable   - no evidence of active bleeds  - will closely monitor and transfuse if needed      Anxiety  - stable   - continue                Lexapro 20 mg daily                Xanax 1 mg at bedtime p.r.n.     Insomnia  - improved  - continue                 Melatonin 6 mg at bedtime p.r.n.     Constipation  - stable  - continue     MiraLax 17 g daily (initiated 9/3)                Colace 100 mg b.i.d.      GERD  - Avoid spicy foods, and nothing to eat or drink within x2 hours of bedtime or laying flat (water is ok)   - Avoid NSAIDs (Advil, ibuprofen, naproxen...) and mcdonnell-2 inhibitors (Mobic, Celebrex)    - continue               Protonix 40 mg daily      ETOH dependence  - stable   - completed Librium protocol  - continue     Multivitamin daily                Folic acid 1 mg daily                Thiamine 100 mg daily     Nicotine dependence   - smoking cessation  counseling given on admission  - continue                Nicotine 21 mg patch q.day (initiated 9/1)     VTE Prophylaxis:  Lovenox 30 mg b.i.d.  COVID-19 testing:  Negative on 08/31/2022  COVID-19 vaccination status:  Unvaccinated     POA: Lesly (sister)  Living will: Yes  Contacts: Lesly Sierra (sister) [phone]     CODE STATUS: DNR  Internal Medicine (attending): Luis Singh MD  Physiatry (consulting):  Leo Parada MD     OUTPATIENT PROVIDERS  PCP: ALISHA Asher  Orthopedic surgery:  Silverio Vaughn MD     DISPOSITION: Condition stable.  Sleep hygiene fair. Appetite at goal.  Last BM 9/5.  Vital signs at goal with no recorded fevers.  BMP unremarkable.   BP mod control.  Increase Norvasc 10 mg daily. Continue aggressive mobilization as tolerated.  Monitor closely.  Notify of  acute changes.  Staffing completed today.    Staffing 9/6/2022: Continent of bowel and bladder.  Staples to be removed today.  Appetite is good.  PT:  Overall mod independent.  Setup for transfers.  Mod assist for car transfers.  Needs ramp for entering .  OT: Overall independent to set up with ADLs.  Needs family training.  Projected discharge 09/08.    Iftikhar Escudero NP conducted independent physical examination and assisted with medical documentation.    Total time spent on this encounter including chart review and direct MD + NP 1-on-1 patient interaction: 43 minutes   Over 50% of this time was spent in counseling and coordination of care

## 2022-09-06 NOTE — DISCHARGE SUMMARY
[facility]  Orthopedics  Discharge Summary      Patient Name: Mary Lou Sierra  MRN: [id number]  Admission Date: 8/23/2022  Hospital Length of Stay: 8 days  Discharge Date and Time: 8/31/2022  1:30 PM  Attending Physician: No att. providers found   Discharging Provider: ALISHA Medrano  Primary Care Provider: Rosalinda Torres NP    HPI/hospital course:  The patient is a 71-year-old female who had a fall down stairs and injured her left lower extremity.  She was brought to [facility] ER and found to have a left femur shaft fracture.  She was admitted to Orthopedics service.  She was taken to the operating room by Dr. Vaughn on August 24th for intramedullary nailing of her left femur.  Surgery proceeded as planned without complication and the patient tolerated well.  She was admitted to the orthopedic floor postoperatively for routine care.  Her pain was controlled throughout her stay.  She was able to mobilize with physical therapy.  She progressed plan was transferred to inpatient rehab on postoperative day 7.     Procedure(s) (LRB):  INSERTION, INTRAMEDULLARY TERENCE, FEMUR (Left)              Significant Diagnostic Studies:  Postoperative x-rays of the left femur demonstrate good fracture alignment with all hardware intact    Pending Diagnostic Studies:       None          Final Active Diagnoses:    Diagnosis Date Noted POA    PRINCIPAL PROBLEM:  Displaced spiral fracture of shaft of left femur, initial encounter for closed fracture [S72.342A] 08/24/2022 Yes      Problems Resolved During this Admission:      Discharged Condition: stable    Disposition: Rehab Facility    Follow Up:   Follow-up Information       Silverio Vaughn MD. Go on 9/14/2022.    Specialty: Orthopedic Surgery  Why: For suture removal, For wound re-check-   Follow up appointment on Wednesday 9-14 at 8:00 am  Contact information:  [address] [place] [address] [phone]                            Patient Instructions:      Diet Adult Regular     Notify your health care provider if you experience any of the following:  temperature >100.4     Notify your health care provider if you experience any of the following:  severe uncontrolled pain     Notify your health care provider if you experience any of the following:  redness, tenderness, or signs of infection (pain, swelling, redness, odor or green/yellow discharge around incision site)     Change dressing (specify)   Order Comments: Dressing change: 1 times per day using t[place]a [place].     Weight bearing restrictions (specify):   Order Comments: Ok to weight bear to left lower extremity to stand/transfer; no ambulation; full ROM     Medications:  Reconciled Home Medications:      Medication List        START taking these medications      aspirin 81 MG EC tablet  Commonly known as: ECOTRIN  Take 1 tablet (81 mg total) by mouth 2 (two) times a day.     enoxaparin 30 mg/0.3 mL Syrg  Commonly known as: LOVENOX  Inject 0.3 mLs (30 mg total) into the skin every 12 (twelve) hours. for 14 days     HYDROcodone-acetaminophen 5-325 mg per tablet  Commonly known as: NORCO  Take 1 tablet by mouth every 4 (four) hours as needed for Pain.     methocarbamoL 500 MG Tab  Commonly known as: ROBAXIN  Take 1 tablet (500 mg total) by mouth 3 (three) times daily as needed (spasm).            CONTINUE taking these medications      ALPRAZolam 1 MG Tb24  Commonly known as: XANAX XR  Take by mouth daily as needed.     amLODIPine 10 MG tablet  Commonly known as: NORVASC  Take 10 mg by mouth once daily.     benazepril-hydrochlorthiazide 10-12.5 mg Tab  Commonly known as: LOTENSIN HCT  Take 1 tablet by mouth once daily.     EScitalopram oxalate 20 MG tablet  Commonly known as: LEXAPRO  Take 20 mg by mouth once daily.              ALISHA Medrano  Orthopedics  [facility]

## 2022-09-07 PROCEDURE — 11800000 HC REHAB PRIVATE ROOM

## 2022-09-07 PROCEDURE — 94799 UNLISTED PULMONARY SVC/PX: CPT

## 2022-09-07 PROCEDURE — 97530 THERAPEUTIC ACTIVITIES: CPT

## 2022-09-07 PROCEDURE — S4991 NICOTINE PATCH NONLEGEND: HCPCS | Performed by: NURSE PRACTITIONER

## 2022-09-07 PROCEDURE — 25000003 PHARM REV CODE 250: Performed by: NURSE PRACTITIONER

## 2022-09-07 PROCEDURE — 63600175 PHARM REV CODE 636 W HCPCS: Performed by: NURSE PRACTITIONER

## 2022-09-07 PROCEDURE — 97535 SELF CARE MNGMENT TRAINING: CPT

## 2022-09-07 PROCEDURE — 97110 THERAPEUTIC EXERCISES: CPT

## 2022-09-07 PROCEDURE — 97168 OT RE-EVAL EST PLAN CARE: CPT

## 2022-09-07 RX ADMIN — METHOCARBAMOL 500 MG: 500 TABLET ORAL at 10:09

## 2022-09-07 RX ADMIN — ESCITALOPRAM OXALATE 20 MG: 20 TABLET ORAL at 07:09

## 2022-09-07 RX ADMIN — FOLIC ACID 1 MG: 1 TABLET ORAL at 07:09

## 2022-09-07 RX ADMIN — AMLODIPINE BESYLATE 10 MG: 5 TABLET ORAL at 05:09

## 2022-09-07 RX ADMIN — ACETAMINOPHEN 325MG 650 MG: 325 TABLET ORAL at 05:09

## 2022-09-07 RX ADMIN — MULTIPLE VITAMINS W/ MINERALS TAB 1 TABLET: TAB at 07:09

## 2022-09-07 RX ADMIN — DOCUSATE SODIUM 100 MG: 100 CAPSULE, LIQUID FILLED ORAL at 07:09

## 2022-09-07 RX ADMIN — PANTOPRAZOLE SODIUM 40 MG: 40 TABLET, DELAYED RELEASE ORAL at 07:09

## 2022-09-07 RX ADMIN — METHOCARBAMOL 500 MG: 500 TABLET ORAL at 08:09

## 2022-09-07 RX ADMIN — HYDROCHLOROTHIAZIDE 25 MG: 25 TABLET ORAL at 05:09

## 2022-09-07 RX ADMIN — Medication 100 MG: at 07:09

## 2022-09-07 RX ADMIN — ACETAMINOPHEN 325MG 650 MG: 325 TABLET ORAL at 12:09

## 2022-09-07 RX ADMIN — ENOXAPARIN SODIUM 30 MG: 30 INJECTION SUBCUTANEOUS at 07:09

## 2022-09-07 RX ADMIN — ALPRAZOLAM 1 MG: 1 TABLET ORAL at 08:09

## 2022-09-07 RX ADMIN — NICOTINE 1 PATCH: 21 PATCH, EXTENDED RELEASE TRANSDERMAL at 08:09

## 2022-09-07 RX ADMIN — METHOCARBAMOL 500 MG: 500 TABLET ORAL at 02:09

## 2022-09-07 RX ADMIN — POLYETHYLENE GLYCOL 3350 17 G: 17 POWDER, FOR SOLUTION ORAL at 07:09

## 2022-09-07 RX ADMIN — ENOXAPARIN SODIUM 30 MG: 30 INJECTION SUBCUTANEOUS at 08:09

## 2022-09-07 RX ADMIN — METHOCARBAMOL 500 MG: 500 TABLET ORAL at 05:09

## 2022-09-07 NOTE — PLAN OF CARE
Spoke with Mandy with [employer] ([phone]).  RW, w/c, BSC, and shower chair (no back) should be covered, so no amount will be owed upon delivery.  They MAY bill pt later for 10% ($16.58) and $3.09 monthly for w/c rental.  Informed pt.    The current issue is that the pt has her friend, Vida, working on beginning her ramp construction today, but she is uncertain how long it will take her.  I offered to email Vida the ramp instructions, and we called Vida for her email address ([email or web address]), after which I emailed her the instructions.      Thus far, pt reached out to her friend with whom she usually spends weekends, Latonia Parada, who did not respond in the welcoming way that the pt expected, so she no longer sees it as an option to stay at her home temporarily.    Pt's sister, Lesly, has reportedly been very impatient and unhelpful when pt has called her.  They argued this morning, and pt was very emotional and tearful with me.  She says this is not a new response from Lesly, but it upset her nonetheless.  Lesly told the pt to just stay in the hospital until the ramp in constructed, to which pt responded that she cannot just stay in the hospital like it is a hotel if she does not need to be here medically much longer.      Pt was trying to reach her brother, Yefri, but had the incorrect number written down.  I called Lesly and obtained the correct number ([phone]) and provided it to the pt.  (She plans to call him to see if he can either assist Vida with the ramp construction OR allow pt to stay at his home until her ramp is built, as he lives in a flat residence.)    Pt will let me know once all plans are sorted.

## 2022-09-07 NOTE — PLAN OF CARE
Problem: Pain Acute  Goal: Acceptable Pain Control and Functional Ability  Outcome: Ongoing, Progressing  Intervention: Develop Pain Management Plan  Flowsheets (Taken 9/6/2022 2280)  Pain Management Interventions: medication offered

## 2022-09-07 NOTE — PT/OT/SLP PROGRESS
Physical Therapy Inpatient Rehab Treatment    Patient Name:  Mary Lou Sierra   MRN:  [id number]    Recommendations:     Discharge Recommendations:  home health PT   Discharge Equipment Recommendations: wheelchair   Barriers to discharge: None    Assessment:     Mary Lou Sierra is a 71 y.o. female admitted with a medical diagnosis of S/p left hip fracture.  She presents with the following impairments/functional limitations:  weakness, impaired endurance, impaired self care skills, impaired functional mobility, gait instability, impaired balance, decreased lower extremity function, pain, orthopedic precautions .    Rehab Diagnosis:     Recent Surgery: * No surgery found *      General Precautions: Standard, fall     Orthopedic Precautions:LLE weight bearing as tolerated (Transfers only. NO AMBULATION.)     Braces: N/A    Rehab Prognosis: Good; patient would benefit from acute skilled PT services to address these deficits and reach maximum level of function.      History:     Past Medical History:   Diagnosis Date    Hypertension        No past surgical history on file.    Subjective     Chief Complaint: Aching pain in L hip  Patient/Family Comments/goals: To go home.     Vitals   Vitals at Rest  BP    HR    O2 Sat    Pain 5/10     Vitals With Activity  BP    HR    O2 Sat    Pain      Respiratory Status: Room air    Patients cultural, spiritual, Yarsanism conflicts given the current situation: no      Objective:     Communicated with RN prior to session.  Patient found up in chair with peripheral IV  upon PT entry to room.    Pt is Oriented x3 and  emotional due to familial/friend issues and living arrangements, Alert, and Cooperative.    Functional Mobility:   Wheelchair Propulsion:  Pt propelled Standard wheelchair x 640 feet on uneven terrains outdoors with  Bilateral upper extremity with Stand-by Assistance.   Toilet t/f x 2 trials. + small void and 2nd trials pt was left on BSC with all needs in reach and  verbalized understanding to call for assist.      Current   Status   Discharge   Goal   Functional Area: Care Score:     Roll Left and Right    Independent   Sit to Lying   Independent   Lying to Sitting on Side of Bed   Independent   Sit to Stand 6 With RW Independent   Chair/Bed-to-Chair Transfer 6 Recliner > W/c > bedside commode with RW using a stand step pattern Independent   Car Transfer   Independent   Walk 10 Feet    Not attempted due to medical/safety concerns   Walk 50 Feet with Two Turns    Not attempted due to medical/safety concerns   Walk 150 Feet    Not attempted due to medical/safety concerns   Walk 10 Feet Uneven Surface    Not attempted due to medical/safety concerns   1 Step (Curb)    Not attempted due to medical/safety concerns   4 Steps    Not attempted due to medical/safety concerns   12 Steps    Not attempted due to medical/safety concerns   Picking Up Object   Set-up/clean-up   Wheel 50 Feet with Two Turns 6  Independent   Wheel 150 Feet 6 200' even surfaces  Independent       Therapeutic Activities and Exercises:  Seated TherX: 2 x 15 in w/c with BUE support; 4# RLE & 2# LLE; PT assisted Pt with HEP   Knee extension    Hip flexion    Ankle pumps    Hip adduction with ball    Hip abduction with Red Theraband       Upper extremity bike exercise: seated in w/c 5/5    Activity Tolerance Good    Patient left  on bedside commode  with call button in reach and CNA notified.    Education provided: roles and goals of PT/PTA, transfer training, balance training, safety awareness, assistive device, wheelchair management, and strengthening exercises    Expected compliance:    GOALS:   Multidisciplinary Problems       Physical Therapy Goals          Problem: Physical Therapy    Goal Priority Disciplines Outcome Goal Variances Interventions   Physical Therapy Goal     PT, PT/OT Ongoing, Progressing     Description: Short Term goals      Bed Mobility   Roll Right and Left Setup or Clean-up Assistance .  (MET)  Lying to sitting Setup or Clean-up Assistance . (MET)  Sitting to lying Setup or Clean-up Assistance . (MET)  Roll left and right independently (MET)  Sitting to lying independently (MET)  Lying to sitting independently (MET)    Transfers    Pt will be able to perform Stand pivot  chair/bed to chair transfer With RW Setup or Clean-up Assistance . (MET)  Pt will be able to perform Sit to stand with RW Setup or Clean-up Assistance . (MET)  Pt will be able to perform Car transfer with RW Setup or Clean-up Assistance . (MET)  Pt will be able to perform stand pivot chair/bed to chair transfer with RW independently (MET)  Sit to stand transfer independently using RW  (MET)    Wheelchair Mobility   Pt will be able to propel 500 feet in rubens wheelchair Independent. (MET)  Manual wheelchair 1000 feet independently using Bilateral upper extremity       Pt will be able to perform Seated HEP independently   Pt will be able to perform supine HEP independently      Timeframe: By Re-eval                         Plan:     During this hospitalization, patient to be seen 5 x/week to address the identified rehab impairments via therapeutic activities, therapeutic exercises, wheelchair management/training and progress toward the following goals:    Plan of Care Expires:  09/06/22    Additional Information:     Pt is able to independently propel wheelchair, remove leg rests, and lock/unlock wheelchair safely.     Time Tracking:     PT Received On: 09/07/22  Time In 1300     Time Out 1400  Total Time 60 min  Therapy Time PT Individual: 60  Missed Time:    Time Missed due to:      Billable Minutes: Therapeutic Activity 30 and Therapeutic Exercise 30    09/07/2022

## 2022-09-07 NOTE — PT/OT/SLP PROGRESS
Physical Therapy Inpatient Rehab Treatment    Patient Name:  Mary Lou Sierra   MRN:  [id number]    Recommendations:     Discharge Recommendations:  home health PT   Discharge Equipment Recommendations: wheelchair   Barriers to discharge: None    Assessment:     Mary Lou Sierra is a 71 y.o. female admitted with a medical diagnosis of S/p left hip fracture.  She presents with the following impairments/functional limitations:  weakness, impaired endurance, impaired self care skills, impaired functional mobility, gait instability, impaired balance, decreased lower extremity function, pain, orthopedic precautions .    Rehab Diagnosis:     Recent Surgery: * No surgery found *      General Precautions: Standard, fall     Orthopedic Precautions:LLE weight bearing as tolerated (Transfers only. NO AMBULATION.)     Braces: N/A    Rehab Prognosis: Good; patient would benefit from acute skilled PT services to address these deficits and reach maximum level of function.      History:     Past Medical History:   Diagnosis Date    Hypertension        No past surgical history on file.    Subjective     Chief Complaint: Aching pain in L hip  Patient/Family Comments/goals: To leave on her birthday ([birth year])    Vitals   Vitals at Rest  BP    HR    O2 Sat    Pain 5/10     Vitals With Activity  BP    HR    O2 Sat    Pain      Respiratory Status: Room air    Patients cultural, spiritual, Holiness conflicts given the current situation: no      Objective:     Communicated with RN prior to session.  Patient found  up in chair  with peripheral IV  upon PT entry to room.    Pt is Oriented x3 and  emotional about familial problems and living arrangements, Alert, and Cooperative.    Functional Mobility:        Current   Status   Discharge   Goal   Functional Area: Care Score:     Roll Left and Right  6  Independent   Sit to Lying  6 On mat  Independent   Lying to Sitting on Side of Bed  6  Independent   Sit to Stand 6 With RW Independent    Chair/Bed-to-Chair Transfer 6 With RW using stand pivot for t/fs; Pt able to remove leg rests and open RW. Independent   Car Transfer 6 With RW using stand pivot t/fs; Pt able to maintain LLE precautions Independent   Walk 10 Feet    Not attempted due to medical/safety concerns   Walk 50 Feet with Two Turns    Not attempted due to medical/safety concerns   Walk 150 Feet    Not attempted due to medical/safety concerns   Walk 10 Feet Uneven Surface    Not attempted due to medical/safety concerns   1 Step (Curb)    Not attempted due to medical/safety concerns   4 Steps    Not attempted due to medical/safety concerns   12 Steps    Not attempted due to medical/safety concerns   Picking Up Object 6 With RW and reacher Set-up/clean-up   Wheel 50 Feet with Two Turns    Independent   Wheel 150 Feet    Independent       Therapeutic Activities and Exercises:  Supine TherX: LLE 3 x 10 BUE support; PT reviewed HEP with patient  Quad set  Glute set  Hip and knee flexion AAROM with sliding board  Hip abd/add AAROM with sliding board        Activity Tolerance Good    Patient left up in chair with all lines intact, call button in reach, and chair alarm on.    Education provided: roles and goals of PT/PTA, transfer training, bed mob, balance training, safety awareness, assistive device, wheelchair management, and strengthening exercises    Expected compliance:    GOALS:   Multidisciplinary Problems       Physical Therapy Goals          Problem: Physical Therapy    Goal Priority Disciplines Outcome Goal Variances Interventions   Physical Therapy Goal     PT, PT/OT Ongoing, Progressing     Description: Short Term goals      Bed Mobility   Roll Right and Left Setup or Clean-up Assistance . (MET)  Lying to sitting Setup or Clean-up Assistance . (MET)  Sitting to lying Setup or Clean-up Assistance . (MET)  Roll left and right independently  Sitting to lying independently  Lying to sitting independently    Transfers    Pt will be able to  perform Stand pivot  chair/bed to chair transfer With RW Setup or Clean-up Assistance . (MET)  Pt will be able to perform Sit to stand with RW Setup or Clean-up Assistance . (MET)  Pt will be able to perform Car transfer with RW Setup or Clean-up Assistance .  Pt will be able to perform stand pivot chair/bed to chair transfer with RW independently  Sit to stand transfer independently using RW     Wheelchair Mobility   Pt will be able to propel 500 feet in rubens wheelchair Independent. (MET)  Manual wheelchair 1000 feet independently using Bilateral upper extremity       Timeframe: By Re-eval                         Plan:     During this hospitalization, patient to be seen 5 x/week to address the identified rehab impairments via therapeutic activities, therapeutic exercises, wheelchair management/training and progress toward the following goals:    Plan of Care Expires:  09/06/22    Additional Information:       Time Tracking:     PT Received On: 09/07/22  Time In 0930     Time Out 1030  Total Time 60 min  Therapy Time PT Individual: 60  Missed Time:    Time Missed due to:      Billable Minutes: Therapeutic Activity 30 and Therapeutic Exercise 30    09/07/2022

## 2022-09-07 NOTE — PLAN OF CARE
After conferring with CHARLOTTE Montero, I ordered a shower chair (no back), RW, w/c, and BSC from [place] via [facility].  Will await response on OOP owed by patient after they review.

## 2022-09-07 NOTE — PROGRESS NOTES
[facility] ([facility])  Rehab Progress Note    Patient Name: Mary Lou Sierra  MRN: [id number]  Age: 71 y.o. Sex: female  : 1950  Hospital Length of Stay: 7 days  Date of Service: 2022   Chief Complaint: Left hip fracture s/p IMN on 2022    Subjective:     Basic Information  Admit Information: 71-year-old white female presented to [facility] ED on 2022 complaining of left hip pain after falling down 4 stairs.  PMH significant for HTN, ETOH dependence, nicotine dependence, and anxiety.  Workup significant for left hip fracture.  Tolerated left hip IM nailing on  out perioperative complications.  Tolerated Librium protocol, thiamine and folic acid replacement.  Orthopedic surgery recommended WBAT to E and FROM to [facility].  Tolerated transfer to [facility] inpatient rehab unit on  without incident.  Today's Information: No acute events overnight.  Sitting up in chair with therapist at bedside.  Reports good sleep and appetite.  Last BM .  Anxious this morning due to family drama.  Trying to get help from her family to build a ramp at her home.  Vital signs at goal with no recorded fevers.  No labs or imaging today.  Review of patient's allergies indicates:  No Known Allergies     Current Facility-Administered Medications:     acetaminophen tablet 650 mg, 650 mg, Oral, Q6H, Timo A Kena, FNP, 650 mg at 22 0537    ALPRAZolam tablet 1 mg, 1 mg, Oral, Nightly PRN, Timo A Kena, FNP, 1 mg at 22 2043    amLODIPine tablet 10 mg, 10 mg, Oral, Daily, Timo A Kena, FNP, 10 mg at 22 0537    benzonatate capsule 100 mg, 100 mg, Oral, TID PRN, Timo A Kena, FNP    bisacodyL suppository 10 mg, 10 mg, Rectal, Daily PRN, Timo A Kena, FNP, 10 mg at 22 1152    docusate sodium capsule 100 mg, 100 mg, Oral, BID, Timo A Kena, FNP, 100 mg at 22 0746    enoxaparin injection 30 mg, 30 mg, Subcutaneous, Q12H,  Timo RONQUILLO Kena, FNP, 30 mg at 09/07/22 0746    EScitalopram oxalate tablet 20 mg, 20 mg, Oral, Daily, Timo A Kena, FNP, 20 mg at 09/07/22 0746    folic acid tablet 1 mg, 1 mg, Oral, Daily, Timo A Kena, FNP, 1 mg at 09/07/22 0746    hydrALAZINE injection 10 mg, 10 mg, Intravenous, Q4H PRN, Timo Marcelinoart, FNP    hydroCHLOROthiazide tablet 25 mg, 25 mg, Oral, Daily, Timo A Kena, FNP, 25 mg at 09/07/22 0537    HYDROcodone-acetaminophen 5-325 mg per tablet 1 tablet, 1 tablet, Oral, Q4H PRN, Timo Escudero, FNP, 1 tablet at 09/06/22 2043    hydrOXYzine pamoate capsule 50 mg, 50 mg, Oral, Nightly PRN, Timo Marcelinoart, FNP    labetalol 20 mg/4 mL (5 mg/mL) IV syring, 10 mg, Intravenous, Q4H PRN, Timo Marcelinoart, FNP    melatonin tablet 6 mg, 6 mg, Oral, Nightly PRN, Timo Brisenoehart, FNP    methocarbamoL tablet 500 mg, 500 mg, Oral, TID, Timo A Kena, FNP, 500 mg at 09/07/22 0537    metoprolol injection 10 mg, 10 mg, Intravenous, Q2H PRN, Timo A Kena, FNP    multivitamin tablet, 1 tablet, Oral, Daily, Timo A Kena, FNP, 1 tablet at 09/07/22 0746    nicotine 21 mg/24 hr 1 patch, 1 patch, Transdermal, Daily, Timo A Kena, FNP, 1 patch at 09/07/22 0800    nitroGLYCERIN SL tablet 0.4 mg, 0.4 mg, Sublingual, Q5 Min PRN, Timo Marcelinoart, FNP    ondansetron disintegrating tablet 4 mg, 4 mg, Oral, Q6H PRN, Timo A Kena, FNP    pantoprazole EC tablet 40 mg, 40 mg, Oral, Daily, Timo A Kena, FNP, 40 mg at 09/07/22 0746    polyethylene glycol packet 17 g, 17 g, Oral, BID PRN, Timo A Kena, FNP, 17 g at 09/03/22 0707    polyethylene glycol packet 17 g, 17 g, Oral, Daily, Timo A Kena, FNP, 17 g at 09/07/22 0746    promethazine tablet 25 mg, 25 mg, Oral, Q6H PRN, Timo A Kena, FNP    thiamine tablet 100 mg, 100 mg, Oral, Daily, Timo A Kena, FNP, 100 mg at 09/07/22 0746     Review of Systems   Complete 12-point  "review of symptoms negative except for what's mentioned in HPI     Objective:     BP (!) 156/76   Pulse 90   Temp 97.6 °F (36.4 °C) (Oral)   Resp 19   Ht 5' 2.5" (1.588 m)   Wt 72.3 kg (159 lb 6.3 oz)   SpO2 98%   BMI 28.69 kg/m²        Intake/Output Summary (Last 24 hours) at 9/7/2022 0941  Last data filed at 9/6/2022 1700  Gross per 24 hour   Intake 720 ml   Output --   Net 720 ml       Physical Exam  Constitutional:       Appearance: Normal appearance.   HENT:      Head: Normocephalic.      Mouth/Throat:      Mouth: Mucous membranes are moist.   Eyes:      Pupils: Pupils are equal, round, and reactive to light.   Cardiovascular:      Rate and Rhythm: Normal rate and regular rhythm.      Heart sounds: Normal heart sounds.      Comments: 2/6 murmur LCW, 5th ICS, MCL.  LLE 1+ edema  Pulmonary:      Effort: Pulmonary effort is normal.      Breath sounds: Normal breath sounds.   Abdominal:      General: Bowel sounds are normal.      Palpations: Abdomen is soft.   Musculoskeletal:      Cervical back: Neck supple.      Comments: Generalized weakness and muscle atrophy. Left hip dressing clean and intact    Skin:     General: Skin is warm and dry.   Neurological:      General: No focal deficit present.      Mental Status: She is alert and oriented to person, place, and time.   Psychiatric:         Mood and Affect: Mood normal.         Behavior: Behavior normal.         Thought Content: Thought content normal.         Judgment: Judgment normal.   *MD performed and documented physical examination       Lines/Drains/Airways       None                 Labs  No results found for this or any previous visit (from the past 24 hour(s)).      Radiology  Left femur XR two view on 08/24/2022 at 12:41 p.m., IMPRESSION: There has been interval insertion of an intramedullary alva traversing a fracture of the midshaft and distal aspect of the femur position and alignment of the visualized osseous structures is anatomical multiple " orthopedic screws identified in the distal femur    Assessment/Plan:     71 y.o. WF admitted on 8/31/2022    Left hip fracture  - s/p left hip IMN on 8/24/2022  - remove staples on 09/06  - continue                Norco 5 mg/325 mg q.4 hours p.r.n.                 Lovenox 30 mg q.12 hours                     Robaxin 500 mg t.i.d.                 Multivitamin daily                 Tylenol 650 mg q.6 hours  - defer to physiatry for rehab and pain management  - PT/OT/RT following     HTN  - BP at goal!!  - continue  HCTZ 25 mg daily (initiated 9/3)   Norvasc 10 mg daily (increased 9/6)               Hydralazine 10 mg every 2 hours as needed for BP > 160/90               Labetalol 10 mg every 2 hours as needed for BP > 160/90     Normocytic anemia  - asymptomatic  - H/H stable   - no evidence of active bleeds  - will closely monitor and transfuse if needed      Anxiety  - stable   - continue                Lexapro 20 mg daily                Xanax 1 mg at bedtime p.r.n.     Insomnia  - improved  - continue                 Melatonin 6 mg at bedtime p.r.n.     Constipation  - stable  - continue     MiraLax 17 g daily (initiated 9/3)                Colace 100 mg b.i.d.      GERD  - Avoid spicy foods, and nothing to eat or drink within x2 hours of bedtime or laying flat (water is ok)   - Avoid NSAIDs (Advil, ibuprofen, naproxen...) and mcdonnell-2 inhibitors (Mobic, Celebrex)    - continue               Protonix 40 mg daily      ETOH dependence  - stable   - completed Librium protocol  - continue     Multivitamin daily                Folic acid 1 mg daily                Thiamine 100 mg daily     Nicotine dependence   - smoking cessation counseling given on admission  - continue                Nicotine 21 mg patch q.day (initiated 9/1)     VTE Prophylaxis:  Lovenox 30 mg b.i.d.  COVID-19 testing:  Negative on 08/31/2022  COVID-19 vaccination status:  Unvaccinated     POA: Lesly (sister)  Living will: Yes  Contacts: Lesly Sierra  (sister) [phone]     CODE STATUS: DNR  Internal Medicine (attending): Luis Singh MD  Physiatry (consulting):  Leo Parada MD     OUTPATIENT PROVIDERS  PCP: ALISHA Asher  Orthopedic surgery:  Silverio Vaughn MD     DISPOSITION: Condition stable.  Sleep hygiene, bowel maintenance, and appetite at goal.  Last BM 9/5.  Vital signs at goal with no recorded fevers.  Anxious this morning due to family drama.  Needs rehab prior to discharging home.  Continue aggressive mobilization as tolerated.  Monitor closely.  Notify of acute changes.    Staffing 9/6/2022: Continent of bowel and bladder.  Staples to be removed today.  Appetite is good.  PT:  Overall mod independent.  Setup for transfers.  Mod assist for car transfers.  Needs ramp for entering .  OT: Overall independent to set up with ADLs.  Needs family training.  Projected discharge 09/08.    Iftikhar Escudero NP conducted independent physical examination and assisted with medical documentation.    Total time spent on this encounter including chart review and direct MD + NP 1-on-1 patient interaction: 37 minutes   Over 50% of this time was spent in counseling and coordination of care

## 2022-09-07 NOTE — PLAN OF CARE
Problem: Rehabilitation ([facility]) Plan of Care  Goal: Plan of Care Review  Outcome: Ongoing, Progressing  Goal: Patient-Specific Goal (Individualized)  Outcome: Ongoing, Progressing  Goal: Absence of New-Onset Illness or Injury  Outcome: Ongoing, Progressing  Goal: Optimal Comfort and Wellbeing  Outcome: Ongoing, Progressing  Goal: Readiness for Transition of Care  Outcome: Ongoing, Progressing     Problem: Skin Injury Risk Increased  Goal: Skin Health and Integrity  Outcome: Ongoing, Progressing     Problem: Fall Injury Risk  Goal: Absence of Fall and Fall-Related Injury  Outcome: Ongoing, Progressing     Problem: Pain Acute  Goal: Acceptable Pain Control and Functional Ability  Outcome: Ongoing, Progressing

## 2022-09-07 NOTE — PT/OT/SLP RE-EVAL
"Occupational Therapy Inpatient Rehab Re-Evaluation    Name: Mary Lou Sierra  MRN: [id number]    Recommendations:     Discharge Recommendations:      Discharge Equipment Recommendations: wheelchair, bedside commode   Barriers to discharge: Inaccessible home and Decreased caregiver support    Assessment:  Mary Lou Sierra is a 71 y.o. female admitted with a medical diagnosis of S/p left hip fracture.  She presents with the following impairments/functional limitations:  weakness, impaired endurance, impaired self care skills, impaired functional mobility, impaired balance, pain, orthopedic precautions  .    General Precautions: Standard, fall     Orthopedic Precautions:LLE weight bearing as tolerated     Braces: N/A    Rehab Prognosis: Good; patient would benefit from acute skilled OT services to address these deficits and reach maximum level of function.      History:     Past Medical History:   Diagnosis Date    Hypertension        No past surgical history on file.    Subjective     Orientation: Oriented x4    Chief Complaint: "getting ramp built at home"    Patient/Family Comments/goals: "to go home"    Vitals  Vitals With Activity  BP (!) 159/77     HR 78   O2 Sat     Pain Pain Rating 1: 0/10  Location - Side 1: Left  Location - Orientation 1: upper  Location 1: hip  Pain Addressed 1: Pre-medicate for activity  Pain Rating Post-Intervention 1: 4/10     Respiratory Status: Room air    Patients cultural, spiritual, Presybeterian conflicts given the current situation: no       Living Environment   Living Environment  Lives With: alone  Number of Stairs, Main Entrance: four  Stair Railings, Main Entrance: railings on both sides of stairs  Shower Setup: Tub/Shower combo    Prior Level of Function  BADL: Independent    IADL: Independent    Equipment used at home:  .  DME owned (not currently used): rolling walker.      Upon discharge, patient will have assistance from maybe sister.    Objective:     Patient found up in " chair upon OT entry to room.    Mobility   Patient completed:  Sit to Stand Transfer with independence with rolling walker  Stand to Sit Transfer with independence with rolling walker  Tub Transfer Step Transfer technique with independence with rolling walker    Functional Mobility   In room mobility for ADLs.    ADLs     Current Status   Eating 6   Oral Hygiene 6   Shower, Bathe Self 6   Upper Body Dressing 6   Lower Body Dressing 6   Toileting Hygiene 6   Toilet Transfer 6   Putting On, Taking Off Footwear 6     Limiting Factors for ADLs: motor, endurance, balance, weakness, and safety awareness    Exams     ROM:          -       WFL    Hand Dominance: Right    ROM Hand  Left Hand: WFL  Right Hand: WFL    Strength  Overall Strength:          -       WFL    Sensation  WFL    Coordination:      -       Intact    Balance    Sitting  Sitting Surface: TTB  Static: No UE Support, Normal  Dynamic: No UE extremity support, Normal    Standing  Static: Bilateral UE Extremity Support, Good  Dynamic: Bilateral UE extremity support, Fair    Patient left up in chair with call button in reach.    Education provided: Roles and goals of OT, ADLs, transfer training, wheelchair precautions, safety precautions, fall prevention, equipment recommendations, and home safety    Multidisciplinary Problems       Occupational Therapy Goals       Not on file              Multidisciplinary Problems (Resolved)          Problem: Occupational Therapy    Goal Priority Disciplines Outcome Interventions   Occupational Therapy Goal   (Resolved)     OT, PT/OT Met    Description: Pt will perform LB dressing c AE independently  by d/c. MET  Pt will perform toileting Independently by d/c. MET  Pt will perform toilet t/f Independently by d/c. MET    Pt will perform shower t/f c SB assist by d/c. MET                         Plan     During this hospitalization, patient to be seen 5 x/week to address the identified rehab impairments via self-care/home  management, therapeutic activities, therapeutic exercises and progress toward the following goals:    Plan of Care Expires:  09/13/22    Time Tracking     OT Received On: 09/07/22  Time In 0800     Time Out 0930  Total Time 90 min  Therapy Time: OT Individual: 90  Missed Time:    Missed Time Reason:      Billable Minutes: Re-eval 15 and Self Care/Home Management 75    09/07/2022

## 2022-09-07 NOTE — PLAN OF CARE
Problem: Occupational Therapy  Goal: Occupational Therapy Goal  Description: Pt will perform LB dressing c AE independently  by d/c. MET  Pt will perform toileting Independently by d/c. MET  Pt will perform toilet t/f Independently by d/c. MET    Pt will perform shower t/f c SB assist by d/c. MET    Outcome: Met

## 2022-09-07 NOTE — PLAN OF CARE
Problem: Physical Therapy  Goal: Physical Therapy Goal  Description: Short Term goals      Bed Mobility   Roll Right and Left Setup or Clean-up Assistance . (MET)  Lying to sitting Setup or Clean-up Assistance . (MET)  Sitting to lying Setup or Clean-up Assistance . (MET)  Roll left and right independently (MET)  Sitting to lying independently (MET)  Lying to sitting independently (MET)    Transfers    Pt will be able to perform Stand pivot  chair/bed to chair transfer With RW Setup or Clean-up Assistance . (MET)  Pt will be able to perform Sit to stand with RW Setup or Clean-up Assistance . (MET)  Pt will be able to perform Car transfer with RW Setup or Clean-up Assistance . (MET)  Pt will be able to perform stand pivot chair/bed to chair transfer with RW independently (MET)  Sit to stand transfer independently using RW  (MET)    Wheelchair Mobility   Pt will be able to propel 500 feet in rubens wheelchair Independent. (MET)  Manual wheelchair 1000 feet independently using Bilateral upper extremity   Pt will be able to propel WC ~1000' independently on uneven surfaces.      Pt will be able to perform Seated HEP independently   Pt will be able to perform supine HEP independently      By D/C      Outcome: Ongoing, Progressing

## 2022-09-08 PROCEDURE — S4991 NICOTINE PATCH NONLEGEND: HCPCS | Performed by: NURSE PRACTITIONER

## 2022-09-08 PROCEDURE — 25000003 PHARM REV CODE 250: Performed by: NURSE PRACTITIONER

## 2022-09-08 PROCEDURE — 97110 THERAPEUTIC EXERCISES: CPT

## 2022-09-08 PROCEDURE — 97530 THERAPEUTIC ACTIVITIES: CPT

## 2022-09-08 PROCEDURE — 94799 UNLISTED PULMONARY SVC/PX: CPT

## 2022-09-08 PROCEDURE — 99900035 HC TECH TIME PER 15 MIN (STAT)

## 2022-09-08 PROCEDURE — 63600175 PHARM REV CODE 636 W HCPCS: Performed by: NURSE PRACTITIONER

## 2022-09-08 PROCEDURE — 11800000 HC REHAB PRIVATE ROOM

## 2022-09-08 PROCEDURE — 94761 N-INVAS EAR/PLS OXIMETRY MLT: CPT

## 2022-09-08 RX ORDER — LISINOPRIL 10 MG/1
10 TABLET ORAL DAILY
Status: DISCONTINUED | OUTPATIENT
Start: 2022-09-08 | End: 2022-09-09 | Stop reason: HOSPADM

## 2022-09-08 RX ADMIN — ACETAMINOPHEN 325MG 650 MG: 325 TABLET ORAL at 12:09

## 2022-09-08 RX ADMIN — METHOCARBAMOL 500 MG: 500 TABLET ORAL at 03:09

## 2022-09-08 RX ADMIN — AMLODIPINE BESYLATE 10 MG: 5 TABLET ORAL at 07:09

## 2022-09-08 RX ADMIN — ESCITALOPRAM OXALATE 20 MG: 20 TABLET ORAL at 07:09

## 2022-09-08 RX ADMIN — ENOXAPARIN SODIUM 30 MG: 30 INJECTION SUBCUTANEOUS at 08:09

## 2022-09-08 RX ADMIN — PANTOPRAZOLE SODIUM 40 MG: 40 TABLET, DELAYED RELEASE ORAL at 07:09

## 2022-09-08 RX ADMIN — ALPRAZOLAM 1 MG: 1 TABLET ORAL at 07:09

## 2022-09-08 RX ADMIN — NICOTINE 1 PATCH: 21 PATCH, EXTENDED RELEASE TRANSDERMAL at 07:09

## 2022-09-08 RX ADMIN — MULTIPLE VITAMINS W/ MINERALS TAB 1 TABLET: TAB at 07:09

## 2022-09-08 RX ADMIN — METHOCARBAMOL 500 MG: 500 TABLET ORAL at 08:09

## 2022-09-08 RX ADMIN — Medication 100 MG: at 07:09

## 2022-09-08 RX ADMIN — ENOXAPARIN SODIUM 30 MG: 30 INJECTION SUBCUTANEOUS at 07:09

## 2022-09-08 RX ADMIN — ACETAMINOPHEN 325MG 650 MG: 325 TABLET ORAL at 05:09

## 2022-09-08 RX ADMIN — HYDROCHLOROTHIAZIDE 25 MG: 25 TABLET ORAL at 07:09

## 2022-09-08 RX ADMIN — FOLIC ACID 1 MG: 1 TABLET ORAL at 07:09

## 2022-09-08 RX ADMIN — METHOCARBAMOL 500 MG: 500 TABLET ORAL at 05:09

## 2022-09-08 RX ADMIN — LISINOPRIL 10 MG: 10 TABLET ORAL at 08:09

## 2022-09-08 RX ADMIN — HYDROCODONE BITARTRATE AND ACETAMINOPHEN 1 TABLET: 5; 325 TABLET ORAL at 05:09

## 2022-09-08 RX ADMIN — DOCUSATE SODIUM 100 MG: 100 CAPSULE, LIQUID FILLED ORAL at 08:09

## 2022-09-08 NOTE — PROGRESS NOTES
[facility] ([facility])  Rehab Progress Note    Patient Name: Mary Lou Sierra  MRN: [id number]  Age: 72 y.o. Sex: female  : 1950  Hospital Length of Stay: 8 days  Date of Service: 2022   Chief Complaint: Left hip fracture s/p IMN on 2022    Subjective:     Basic Information  Admit Information: 71-year-old white female presented to [facility] ED on 2022 complaining of left hip pain after falling down 4 stairs.  PMH significant for HTN, ETOH dependence, nicotine dependence, and anxiety.  Workup significant for left hip fracture.  Tolerated left hip IM nailing on  out perioperative complications.  Tolerated Librium protocol, thiamine and folic acid replacement.  Orthopedic surgery recommended WBAT to E and FROM to [facility].  Tolerated transfer to [facility] inpatient rehab unit on  without incident.  Today's Information: No acute events overnight.  Sitting up in chair.  Reports good sleep and appetite.  Last BM .  BP moderately controlled.  No labs or imaging today.  Review of patient's allergies indicates:  No Known Allergies     Current Facility-Administered Medications:     acetaminophen tablet 650 mg, 650 mg, Oral, Q6H, Timo A Kena, FNP, 650 mg at 22 0532    ALPRAZolam tablet 1 mg, 1 mg, Oral, Nightly PRN, Timo A Kena, FNP, 1 mg at 22    amLODIPine tablet 10 mg, 10 mg, Oral, Daily, Timo A Kena, FNP, 10 mg at 22 0755    benzonatate capsule 100 mg, 100 mg, Oral, TID PRN, Timo A Kena, FNP    bisacodyL suppository 10 mg, 10 mg, Rectal, Daily PRN, Timo A Kena, FNP, 10 mg at 22 1152    docusate sodium capsule 100 mg, 100 mg, Oral, BID, Timo A Kena, FNP, 100 mg at 22 0746    enoxaparin injection 30 mg, 30 mg, Subcutaneous, Q12H, Timo Brisenoehart, FNP, 30 mg at 22 0757    EScitalopram oxalate tablet 20 mg, 20 mg, Oral, Daily, ALISHA Proctor, 20 mg at 22 075     folic acid tablet 1 mg, 1 mg, Oral, Daily, Timo RONQUILLO Kena, FNP, 1 mg at 09/08/22 0757    hydrALAZINE injection 10 mg, 10 mg, Intravenous, Q4H PRN, Timo A Kena, FNP    hydroCHLOROthiazide tablet 25 mg, 25 mg, Oral, Daily, Timo A Kena, FNP, 25 mg at 09/08/22 0757    HYDROcodone-acetaminophen 5-325 mg per tablet 1 tablet, 1 tablet, Oral, Q4H PRN, Timo A Kena, FNP, 1 tablet at 09/08/22 0533    hydrOXYzine pamoate capsule 50 mg, 50 mg, Oral, Nightly PRN, Timo A Kena, FNP    labetalol 20 mg/4 mL (5 mg/mL) IV syring, 10 mg, Intravenous, Q4H PRN, Timo A Kena, FNP    lisinopriL tablet 10 mg, 10 mg, Oral, Daily, Timo Escudero, FNP, 10 mg at 09/08/22 0859    melatonin tablet 6 mg, 6 mg, Oral, Nightly PRN, Timo A Kena, FNP    methocarbamoL tablet 500 mg, 500 mg, Oral, TID, Timo A Kena, FNP, 500 mg at 09/08/22 0533    metoprolol injection 10 mg, 10 mg, Intravenous, Q2H PRN, Timo A Kena, FNP    multivitamin tablet, 1 tablet, Oral, Daily, Timo A Kena, FNP, 1 tablet at 09/08/22 0755    nicotine 21 mg/24 hr 1 patch, 1 patch, Transdermal, Daily, Timo A Kena, FNP, 1 patch at 09/08/22 0758    nitroGLYCERIN SL tablet 0.4 mg, 0.4 mg, Sublingual, Q5 Min PRN, Timo A Kena, FNP    ondansetron disintegrating tablet 4 mg, 4 mg, Oral, Q6H PRN, Timo A Kena, FNP    pantoprazole EC tablet 40 mg, 40 mg, Oral, Daily, Timo A Kena, FNP, 40 mg at 09/08/22 0755    polyethylene glycol packet 17 g, 17 g, Oral, BID PRN, Timo A Kena, FNP, 17 g at 09/03/22 0707    polyethylene glycol packet 17 g, 17 g, Oral, Daily, Timo YG Kena, FNP, 17 g at 09/07/22 0746    promethazine tablet 25 mg, 25 mg, Oral, Q6H PRN, Timo Brisenoehart, FNP    thiamine tablet 100 mg, 100 mg, Oral, Daily, Timo RONQUILLO Kena, FNP, 100 mg at 09/08/22 0755     Review of Systems   Complete 12-point review of symptoms negative except for what's mentioned in HPI  "    Objective:     BP (!) 156/78   Pulse 87   Temp 98.1 °F (36.7 °C) (Oral)   Resp 18   Ht 5' 2.5" (1.588 m)   Wt 72.3 kg (159 lb 6.3 oz)   SpO2 98%   BMI 28.69 kg/m²        Intake/Output Summary (Last 24 hours) at 9/8/2022 1117  Last data filed at 9/8/2022 0800  Gross per 24 hour   Intake 960 ml   Output --   Net 960 ml       Physical Exam  Constitutional:       Appearance: Normal appearance.   HENT:      Head: Normocephalic.      Mouth/Throat:      Mouth: Mucous membranes are moist.   Eyes:      Pupils: Pupils are equal, round, and reactive to light.   Cardiovascular:      Rate and Rhythm: Normal rate and regular rhythm.      Heart sounds: Normal heart sounds.      Comments: 2/6 murmur LCW, 5th ICS, MCL.  LLE 1+ edema  Pulmonary:      Effort: Pulmonary effort is normal.      Breath sounds: Normal breath sounds.   Abdominal:      General: Bowel sounds are normal.      Palpations: Abdomen is soft.   Musculoskeletal:      Cervical back: Neck supple.      Comments: Generalized weakness and muscle atrophy. Left hip dressing clean and intact    Skin:     General: Skin is warm and dry.   Neurological:      General: No focal deficit present.      Mental Status: She is alert and oriented to person, place, and time.   Psychiatric:         Mood and Affect: Mood normal.         Behavior: Behavior normal.         Thought Content: Thought content normal.         Judgment: Judgment normal.   *MD performed and documented physical examination       Lines/Drains/Airways       None                 Labs  No results found for this or any previous visit (from the past 24 hour(s)).      Radiology  Left femur XR two view on 08/24/2022 at 12:41 p.m., IMPRESSION: There has been interval insertion of an intramedullary alva traversing a fracture of the midshaft and distal aspect of the femur position and alignment of the visualized osseous structures is anatomical multiple orthopedic screws identified in the distal " femur    Assessment/Plan:     72 y.o. WF admitted on 8/31/2022    Left hip fracture  - s/p left hip IMN on 8/24/2022  - remove staples on 09/06  - continue                Norco 5 mg/325 mg q.4 hours p.r.n.                 Lovenox 30 mg q.12 hours                     Robaxin 500 mg t.i.d.                 Multivitamin daily                 Tylenol 650 mg q.6 hours  - defer to physiatry for rehab and pain management  - PT/OT/RT following     HTN  - BP elevated  - initiated   Lisinopril 10 mg daily (initiated 9/8)  - continue  HCTZ 25 mg daily (initiated 9/3)   Norvasc 10 mg daily (increased 9/6)               Hydralazine 10 mg every 2 hours as needed for BP > 160/90               Labetalol 10 mg every 2 hours as needed for BP > 160/90     Normocytic anemia  - asymptomatic  - H/H stable   - no evidence of active bleeds  - will closely monitor and transfuse if needed      Anxiety  - stable   - continue                Lexapro 20 mg daily                Xanax 1 mg at bedtime p.r.n.     Insomnia  - improved  - continue                 Melatonin 6 mg at bedtime p.r.n.     Constipation  - stable  - continue     MiraLax 17 g daily (initiated 9/3)                Colace 100 mg b.i.d.      GERD  - Avoid spicy foods, and nothing to eat or drink within x2 hours of bedtime or laying flat (water is ok)   - Avoid NSAIDs (Advil, ibuprofen, naproxen...) and mcdonnell-2 inhibitors (Mobic, Celebrex)    - continue               Protonix 40 mg daily      ETOH dependence  - stable   - completed Librium protocol  - continue     Multivitamin daily                Folic acid 1 mg daily                Thiamine 100 mg daily     Nicotine dependence   - smoking cessation counseling given on admission  - continue                Nicotine 21 mg patch q.day (initiated 9/1)     VTE Prophylaxis:  Lovenox 30 mg b.i.d.  COVID-19 testing:  Negative on 08/31/2022  COVID-19 vaccination status:  Unvaccinated     POA: Lesly (sister)  Living will: Yes  Contacts: Lesly  Rigo (sister) [phone]     CODE STATUS: DNR  Internal Medicine (attending): Luis Singh MD  Physiatry (consulting):  Leo Parada MD     OUTPATIENT PROVIDERS  PCP: ALISHA Asher  Orthopedic surgery:  Silverio Vaughn MD     DISPOSITION: Condition stable.  Sleep hygiene, bowel maintenance, and appetite at goal.  Last BM 9/7.  BP moderately controlled.  No labs or imaging today.  Initiate lisinopril 10 mg daily.  Continues to participate in progress in therapy.  Waiting on ramp to be completed prior to discharge home with home health.  Monitor closely.  Notify of acute changes.    Staffing 9/6/2022: Continent of bowel and bladder.  Staples to be removed today.  Appetite is good.  PT:  Overall mod independent.  Setup for transfers.  Mod assist for car transfers.  Needs ramp for entering .  OT: Overall independent to set up with ADLs.  Needs family training.  Projected discharge 09/08.    Iftikhar Escudero NP conducted independent physical examination and assisted with medical documentation.    Total time spent on this encounter including chart review and direct MD + NP 1-on-1 patient interaction: 41 minutes   Over 50% of this time was spent in counseling and coordination of care

## 2022-09-08 NOTE — PT/OT/SLP PROGRESS
Physical Therapy Inpatient Rehab Treatment    Patient Name:  Mary Lou Sierra   MRN:  [id number]    Recommendations:     Discharge Recommendations:  home health PT   Discharge Equipment Recommendations: wheelchair   Barriers to discharge: None    Assessment:     Mary Lou Sierra is a 72 y.o. female admitted with a medical diagnosis of S/p left hip fracture.  She presents with the following impairments/functional limitations:  weakness, impaired endurance, impaired self care skills, impaired functional mobility, gait instability, impaired balance, decreased lower extremity function, pain, orthopedic precautions .    Rehab Diagnosis:     Recent Surgery: * No surgery found *      General Precautions: Standard, fall     Orthopedic Precautions:LLE weight bearing as tolerated (Transfers only. NO AMBULATION.)     Braces: N/A    Rehab Prognosis: Good; patient would benefit from acute skilled PT services to address these deficits and reach maximum level of function.      History:     Past Medical History:   Diagnosis Date    Hypertension        No past surgical history on file.    Subjective     Chief Complaint: Soreness in L hip  Patient/Family Comments/goals: N/A    Vitals   Vitals at Rest  BP    HR    O2 Sat    Pain 5/10     Vitals With Activity  BP    HR    O2 Sat    Pain      Respiratory Status: Room air    Patients cultural, spiritual, Sabianism conflicts given the current situation: no      Objective:     Communicated with RN prior to session.  Patient found up in chair with peripheral IV  upon PT entry to room.    Pt is Oriented x3 and  slightly discouraged 2/2 familial issues, Alert, and Cooperative.    Functional Mobility:        Current   Status   Discharge   Goal   Functional Area: Care Score:     Roll Left and Right 6 Performed on Mat Independent   Sit to Lying 6 Performed on Mat Independent   Lying to Sitting on Side of Bed 6 Performed on Mat Independent   Sit to Stand 6 With RW Independent    Chair/Bed-to-Chair Transfer 6 Recliner <> w/c; w/c <> mat; With RW using a stand pivot t/f Independent   Car Transfer    Independent   Walk 10 Feet    Not attempted due to medical/safety concerns   Walk 50 Feet with Two Turns    Not attempted due to medical/safety concerns   Walk 150 Feet    Not attempted due to medical/safety concerns   Walk 10 Feet Uneven Surface    Not attempted due to medical/safety concerns   1 Step (Curb)    Not attempted due to medical/safety concerns   4 Steps    Not attempted due to medical/safety concerns   12 Steps    Not attempted due to medical/safety concerns   Picking Up Object    Set-up/clean-up   Wheel 50 Feet with Two Turns 6  Independent   Wheel 150 Feet 6 ~700 ft with manual w/c propelling with BUE; Pt demonstrated mild fatigue with increased distance Independent       Therapeutic Activities and Exercises:  Seated TherX: in w/c with BUE support; 2 x 15; 4# RLE & 2# LLE; PT reviewed HEP with patient   Knee extension    Hip flexion    Ankle pumps    Hip adduction with ball    Hip abduction with Theraband       Supine TherX: performed on Mat BLE with BUE support; 2 x 15; PT reviewed HEP with patient  Quad set  Glute set  TKE on bolster  Hip and knee flexion AAROM with sliding board & PT assist with overpressure at the L foot  Hip abd/add AAROM with sliding board  Ankle pumps   Modified Bridges with bolster under BLE    Activity Tolerance Good    Patient left up in chair with all lines intact, call button in reach, and chair alarm on.    Education provided: roles and goals of PT/PTA, transfer training, bed mob, safety awareness, assistive device, wheelchair management, and strengthening exercises    Expected compliance:    GOALS:   Multidisciplinary Problems       Physical Therapy Goals          Problem: Physical Therapy    Goal Priority Disciplines Outcome Goal Variances Interventions   Physical Therapy Goal     PT, PT/OT Ongoing, Progressing     Description: Short Term goals       Bed Mobility   Roll Right and Left Setup or Clean-up Assistance . (MET)  Lying to sitting Setup or Clean-up Assistance . (MET)  Sitting to lying Setup or Clean-up Assistance . (MET)  Roll left and right independently (MET)  Sitting to lying independently (MET)  Lying to sitting independently (MET)    Transfers    Pt will be able to perform Stand pivot  chair/bed to chair transfer With RW Setup or Clean-up Assistance . (MET)  Pt will be able to perform Sit to stand with RW Setup or Clean-up Assistance . (MET)  Pt will be able to perform Car transfer with RW Setup or Clean-up Assistance . (MET)  Pt will be able to perform stand pivot chair/bed to chair transfer with RW independently (MET)  Sit to stand transfer independently using RW  (MET)    Wheelchair Mobility   Pt will be able to propel 500 feet in rubens wheelchair Independent. (MET)  Manual wheelchair 1000 feet independently using Bilateral upper extremity       Pt will be able to perform Seated HEP independently   Pt will be able to perform supine HEP independently      Timeframe: By Re-eval                         Plan:     During this hospitalization, patient to be seen 5 x/week to address the identified rehab impairments via therapeutic activities, therapeutic exercises, wheelchair management/training and progress toward the following goals:    Plan of Care Expires:  09/06/22    Additional Information:         Time Tracking:     PT Received On: 09/08/22  Time In 1030     Time Out 1200  Total Time 90 min  Therapy Time PT Individual: 90  Missed Time:    Time Missed due to:      Billable Minutes: Therapeutic Activity 30 and Therapeutic Exercise 60    09/08/2022

## 2022-09-08 NOTE — PLAN OF CARE
Spoke with pt this am.  She indicated that her cousin, Sheyla Sierra, who lives on [address] in [place], has agreed for pt to stay at her 1-level home until pt's ramp is built (probably by Sat or Sun this week).  Pt will not leave rehab until tomorrow, 9/9.    Pt will obtain Sheyla's physical address for me to send to [facility] for her to be seen at [facility] Saturday.  Alerted [facility].  They will coordinate care transition from [place] to [facility].    Alerted Saurabh re: discharge being planned for tomorrow instead of today so that DME (RW, w/c, BSC, and shower chair) can be delivered here tomorrow.    Pt indicated that Sheyla does not get off of work until late afternoon, and she does not want to inconvenience her any more than necessary.  Assured her that Sheyla can pick her up after work tomorrow.      Cautioned pt that she will have to have someone  her filled scripts from her pharmacy in [place] and bring them to her at [facility].  She will reach out to her sister to do so.

## 2022-09-08 NOTE — PT/OT/SLP PROGRESS
"Occupational Therapy Inpatient Rehab Treatment    Name: Mary Lou Sierra  MRN: [id number]    Assessment:  Mary Lou Sierra is a 72 y.o. female admitted with a medical diagnosis of S/p left hip fracture.  She presents with the following impairments/functional limitations:  weakness, impaired endurance, impaired self care skills, impaired functional mobility, gait instability, impaired balance, decreased coordination, decreased upper extremity function, decreased safety awareness, pain, orthopedic precautions  .    General Precautions: Standard, fall     Orthopedic Precautions:LLE weight bearing as tolerated     Braces: N/A    Rehab Prognosis: Good; patient would benefit from acute skilled OT services to address these deficits and reach maximum level of function.      History:     Past Medical History:   Diagnosis Date    Hypertension        No past surgical history on file.    Subjective     Orientation: Oriented x4    Chief Complaint: Family not helping her with ramp the way she wanted them to.    Patient/Family Comments/goals: "to go home to her own home"    Vitals   Vitals at Rest  /77   HR 85     Respiratory Status: Room air    Patients cultural, spiritual, Taoist conflicts given the current situation: no    Objective:     Patient found up in chair upon OT entry to room.    Mobility   Patient completed:  Sit to Stand Transfer with modified independence with rolling walker  Stand to Sit Transfer with independence with rolling walker    Therapeutic Activities  Pt participated standing activity at table top while working with large pegs on peg board. Pt able to stand for 8-10 mins with one arm supporting. Then pt toss back and forth a balloon using both hands without support for 5 mins and the zoom ball for 5 mins. Pt tolerated well. Balance without arm support was good.    Therapeutic Exercise  Pt performed BUE exe using the UBE for 5 mins forward and 5 mins backward without rest break.    Additional " Treatments: Wheelchair management and mobility independent to/from OT gym to room 406.    Patient left up in chair with call button in reach.     Education provided: Roles and goals of OT, ADLs, transfer training, body mechanics, wheelchair precautions, sequencing, safety precautions, fall prevention, and home safety    Multidisciplinary Problems       Occupational Therapy Goals          Problem: Occupational Therapy    Goal Priority Disciplines Outcome Interventions   Occupational Therapy Goal     OT, PT/OT Ongoing, Progressing    Description: Pt will perform LB dressing c AE independently  by d/c. MET  Pt will perform toileting Independently by d/c. MET  Pt will perform toilet t/f Independently by d/c. MET    Pt will perform shower t/f c SB assist by d/c. MET    Pt will tolerate standing (following WB precautions) at counter for 10 mins while performing grooming or kitchen activity before D/C.                         Time Tracking     OT Received On: 09/08/22  Time In 0800     Time Out 0930  Total Time 90 min  Therapy Time: OT Individual: 90  Missed Time:    Missed Time Reason:      Billable Minutes: Therapeutic Activity 60 and Therapeutic Exercise 30    09/08/2022

## 2022-09-08 NOTE — PLAN OF CARE
Problem: Occupational Therapy  Goal: Occupational Therapy Goal  Description: Pt will perform LB dressing c AE independently  by d/c. MET  Pt will perform toileting Independently by d/c. MET  Pt will perform toilet t/f Independently by d/c. MET    Pt will perform shower t/f c SB assist by d/c. MET    Pt will tolerate standing (following WB precautions) at counter for 10 mins while performing grooming or kitchen activity before D/C.    9/8/2022 0744 by Kylah Herrmann OT  Outcome: Ongoing, Progressing  9/8/2022 0742 by Kylah Herrmann OT  Reactivated

## 2022-09-08 NOTE — PROGRESS NOTES
Inpatient Nutrition Assessment    Admit Date: 8/31/2022   Length of Stay: 8 days  Nutrition Recommendation/Prescription     Continue regular diet as tolerated     Chocolate Boost Original (240 kcal, 10 g pro/svg)        Nutrition Assessment       Chart Review    Reason Seen: Rehab consult    Diagnosis: Left hip fracture s/p IMN on 08/24/2022    Relevant Medical History:     Nutrition-Related Medications: Folic acid, MVI, thiamine,  colace  Calorie Containing IV Medications: no significant kcals from medications at this time    Nutrition-Related Labs: 9/8: PAB 19.5 WNL, 9/1: H/H 9.6/29.5(L), Alb 2.6(L), GFR >60, PAB 13.1,     Diet/PN Order: Diet Adult Regular  Oral Supplement Order: Boost  Tube Feeding Order: none at this time  Appetite/Oral Intake: good/% of meals  Factors Affecting Nutritional Intake:  smell of food on tray makes her nauseous   Food/Orthodox/Cultural Preferences:  No milk, No tea (sub water) - Updated codex in kitchen, [place] Dressing   Skin Integrity: incision  Wound(s):   None documented at this time     Comments    9/6: Pt continues to report good appetite at meals. Appetite much improved. Std kitchen has been coming by to review menu with her. Per EMR is averaging ~100% x last 4 meals. Drinking Boost. Is ready to d/c. Planned d/c tomorrow.     9/1: Pt reports good appetite at breakfast but poor appetite at lunch and at dinner last night. Std smell of food makes her nauseous - obtained menu from kitchen and reviewed with pt. Encouraged pt to order sandwich if does not like food on tray. Encouraged adequate nutrition for healing and intensive rehab therapy. Drinking Boost as receives. For some reason is getting Boost GC-not on order vs Original Boost-ordered. Pt does not like. Clarified with kitchen. Std having trouble sleeping last night d/t had 2-3 cups of coffee before bed and having to urinate overnight. Std also may be d/t anxiety d/t unable to smoke while in hospital.  "Noted-nicotine patch per MAR. Denies any recent wt loss. No wt loss noted per chart review.     Anthropometrics    Height: 5' 2.5" (158.8 cm)    Weight: 72.3 kg (159 lb 6.3 oz) (09/03/22 0300) Weight Method: Standard Scale  BMI (Calculated): 28.7  BMI Classification: overweight (BMI 25-29.9)  Ideal Body Weight (IBW), Female: 112.5 lb  % Ideal Body Weight, Female (lb): 141.68 %  Usual Weight 159 lbs   Usual Weight Provided By: patient    Wt Readings from Last 5 Encounters:   09/03/22 72.3 kg (159 lb 6.3 oz)   08/23/22 72.6 kg (160 lb)     Weight Change(s) Since Admission:  Admit Weight: 72.3 kg (159 lb 6.3 oz) (08/31/22 1530)    Estimated Needs    Weight Used For Calorie Calculations: 72.3 kg (159 lb 6.3 oz)  Energy Calorie Requirements (kcal): 1552  Energy Need Method: Kern-St Jeor (x 1.3 SF)  Weight Used For Protein Calculations: 72.3 kg (159 lb 6.3 oz)  Protein Requirements: 94 kg (1.2 g/kg)  Fluid Requirements (mL): 1552      Evaluation of Received Nutrient Intake    Calories: not meeting estimated needs  Protein: not meeting estimated needs    Patient Education    Not applicable.    Nutrition Diagnosis     PES: Inadequate energy intake r/t food not palatable AEB reports does not like food, eating <25% x 2 meals   Interventions/Goals     Intervention(s): general/healthful diet and commercial beverage  Goal: Consume % of meals/snacks by follow-up. (new)    Monitoring & Evaluation     Dietitian will monitor food and beverage intake, energy intake, weight, weight change, electrolyte and renal panel, glucose/endocrine profile, and gastrointestinal profile.  Nutrition Risk/Follow-Up: low (follow-up in 5-7 days)    "

## 2022-09-08 NOTE — PLAN OF CARE
Problem: Skin Injury Risk Increased  Goal: Skin Health and Integrity  Outcome: Ongoing, Progressing  Intervention: Optimize Skin Protection  Flowsheets (Taken 9/8/2022 0229)  Pressure Reduction Techniques:   frequent weight shift encouraged   weight shift assistance provided     Problem: Fall Injury Risk  Goal: Absence of Fall and Fall-Related Injury  Outcome: Ongoing, Progressing  Intervention: Promote Injury-Free Environment  Flowsheets (Taken 9/8/2022 0229)  Safety Promotion/Fall Prevention:   assistive device/personal item within reach   Fall Risk signage in place   lighting adjusted   nonskid shoes/socks when out of bed   side rails raised x 3   instructed to call staff for mobility

## 2022-09-09 VITALS
WEIGHT: 159.38 LBS | BODY MASS INDEX: 28.24 KG/M2 | OXYGEN SATURATION: 99 % | HEART RATE: 81 BPM | TEMPERATURE: 98 F | SYSTOLIC BLOOD PRESSURE: 155 MMHG | RESPIRATION RATE: 16 BRPM | HEIGHT: 63 IN | DIASTOLIC BLOOD PRESSURE: 70 MMHG

## 2022-09-09 PROCEDURE — 94799 UNLISTED PULMONARY SVC/PX: CPT

## 2022-09-09 PROCEDURE — 25000003 PHARM REV CODE 250: Performed by: NURSE PRACTITIONER

## 2022-09-09 PROCEDURE — S4991 NICOTINE PATCH NONLEGEND: HCPCS | Performed by: NURSE PRACTITIONER

## 2022-09-09 PROCEDURE — 63600175 PHARM REV CODE 636 W HCPCS: Performed by: NURSE PRACTITIONER

## 2022-09-09 PROCEDURE — 97530 THERAPEUTIC ACTIVITIES: CPT

## 2022-09-09 RX ORDER — ASPIRIN 81 MG/1
81 TABLET ORAL 2 TIMES DAILY
Qty: 60 TABLET | Refills: 0 | Status: SHIPPED | OUTPATIENT
Start: 2022-09-09 | End: 2022-10-09

## 2022-09-09 RX ORDER — BENAZEPRIL HYDROCHLORIDE AND HYDROCHLOROTHIAZIDE 10; 12.5 MG/1; MG/1
1 TABLET ORAL DAILY
Qty: 90 TABLET | Refills: 0 | Status: SHIPPED | OUTPATIENT
Start: 2022-09-09 | End: 2022-12-08

## 2022-09-09 RX ORDER — METHOCARBAMOL 500 MG/1
500 TABLET, FILM COATED ORAL 3 TIMES DAILY
Qty: 42 TABLET | Refills: 0 | Status: SHIPPED | OUTPATIENT
Start: 2022-09-09 | End: 2022-09-23

## 2022-09-09 RX ORDER — ESCITALOPRAM OXALATE 20 MG/1
20 TABLET ORAL DAILY
Qty: 30 TABLET | Refills: 2 | Status: SHIPPED | OUTPATIENT
Start: 2022-09-09 | End: 2022-12-08

## 2022-09-09 RX ORDER — HYDROCODONE BITARTRATE AND ACETAMINOPHEN 5; 325 MG/1; MG/1
1 TABLET ORAL EVERY 6 HOURS PRN
Qty: 12 TABLET | Refills: 0 | Status: SHIPPED | OUTPATIENT
Start: 2022-09-09 | End: 2022-09-12

## 2022-09-09 RX ORDER — AMLODIPINE BESYLATE 10 MG/1
10 TABLET ORAL DAILY
Qty: 30 TABLET | Refills: 0 | Status: SHIPPED | OUTPATIENT
Start: 2022-09-09 | End: 2022-10-09

## 2022-09-09 RX ORDER — DOCUSATE SODIUM 100 MG/1
100 CAPSULE, LIQUID FILLED ORAL 2 TIMES DAILY
Qty: 28 CAPSULE | Refills: 0 | Status: SHIPPED | OUTPATIENT
Start: 2022-09-09 | End: 2022-09-23

## 2022-09-09 RX ORDER — ALPRAZOLAM 1 MG/1
1 TABLET, EXTENDED RELEASE ORAL NIGHTLY PRN
Qty: 30 TABLET | Refills: 0 | Status: SHIPPED | OUTPATIENT
Start: 2022-09-09 | End: 2022-10-09

## 2022-09-09 RX ORDER — FOLIC ACID 1 MG/1
1 TABLET ORAL DAILY
Qty: 30 TABLET | Refills: 0 | Status: SHIPPED | OUTPATIENT
Start: 2022-09-09 | End: 2022-10-09

## 2022-09-09 RX ORDER — LANOLIN ALCOHOL/MO/W.PET/CERES
100 CREAM (GRAM) TOPICAL DAILY
Qty: 30 TABLET | Refills: 0 | Status: SHIPPED | OUTPATIENT
Start: 2022-09-09 | End: 2022-10-09

## 2022-09-09 RX ADMIN — LISINOPRIL 10 MG: 10 TABLET ORAL at 08:09

## 2022-09-09 RX ADMIN — Medication 100 MG: at 08:09

## 2022-09-09 RX ADMIN — MULTIPLE VITAMINS W/ MINERALS TAB 1 TABLET: TAB at 08:09

## 2022-09-09 RX ADMIN — NICOTINE 1 PATCH: 21 PATCH, EXTENDED RELEASE TRANSDERMAL at 08:09

## 2022-09-09 RX ADMIN — METHOCARBAMOL 500 MG: 500 TABLET ORAL at 05:09

## 2022-09-09 RX ADMIN — PANTOPRAZOLE SODIUM 40 MG: 40 TABLET, DELAYED RELEASE ORAL at 08:09

## 2022-09-09 RX ADMIN — HYDROCHLOROTHIAZIDE 25 MG: 25 TABLET ORAL at 08:09

## 2022-09-09 RX ADMIN — DOCUSATE SODIUM 100 MG: 100 CAPSULE, LIQUID FILLED ORAL at 08:09

## 2022-09-09 RX ADMIN — ACETAMINOPHEN 325MG 650 MG: 325 TABLET ORAL at 12:09

## 2022-09-09 RX ADMIN — HYDROCODONE BITARTRATE AND ACETAMINOPHEN 1 TABLET: 5; 325 TABLET ORAL at 08:09

## 2022-09-09 RX ADMIN — POLYETHYLENE GLYCOL 3350 17 G: 17 POWDER, FOR SOLUTION ORAL at 08:09

## 2022-09-09 RX ADMIN — ACETAMINOPHEN 325MG 650 MG: 325 TABLET ORAL at 05:09

## 2022-09-09 RX ADMIN — ENOXAPARIN SODIUM 30 MG: 30 INJECTION SUBCUTANEOUS at 08:09

## 2022-09-09 RX ADMIN — ESCITALOPRAM OXALATE 20 MG: 20 TABLET ORAL at 08:09

## 2022-09-09 RX ADMIN — AMLODIPINE BESYLATE 10 MG: 5 TABLET ORAL at 08:09

## 2022-09-09 RX ADMIN — FOLIC ACID 1 MG: 1 TABLET ORAL at 08:09

## 2022-09-09 NOTE — DISCHARGE INSTRUCTIONS
D/c instructions given to pt who verbalized understanding, pt to leave via w/c w/staff to car, condition stable

## 2022-09-09 NOTE — PT/OT/SLP DISCHARGE
Physical Therapy Discharge Summary    Name: Mary Lou Sierra  MRN: [id number]   Principal Problem: S/p left hip fracture          Assessment:     Goals partially met. Pt has met all appropriate goals for safe D/C home. Pt educated on safety with D/C. Pt limited by WBAT for t/fs only, however, made great progress. HEP provided and pt demos good understanding with HEP.     Objective:     GOALS:   Multidisciplinary Problems       Physical Therapy Goals          Problem: Physical Therapy    Goal Priority Disciplines Outcome Goal Variances Interventions   Physical Therapy Goal     PT, PT/OT Ongoing, Progressing     Description: Short Term goals      Bed Mobility   Roll Right and Left Setup or Clean-up Assistance . (MET)  Lying to sitting Setup or Clean-up Assistance . (MET)  Sitting to lying Setup or Clean-up Assistance . (MET)  Roll left and right independently (MET)  Sitting to lying independently (MET)  Lying to sitting independently (MET)    Transfers    Pt will be able to perform Stand pivot  chair/bed to chair transfer With RW Setup or Clean-up Assistance . (MET)  Pt will be able to perform Sit to stand with RW Setup or Clean-up Assistance . (MET)  Pt will be able to perform Car transfer with RW Setup or Clean-up Assistance . (MET)  Pt will be able to perform stand pivot chair/bed to chair transfer with RW independently (MET)  Sit to stand transfer independently using RW  (MET)    Wheelchair Mobility   Pt will be able to propel 500 feet in rubens wheelchair Independent. (MET)  Manual wheelchair 1000 feet independently using Bilateral upper extremity (NOT MET)      Pt will be able to perform Seated HEP independently (MET)  Pt will be able to perform supine HEP independently (MET)      Timeframe: By Re-eval                         Care Scores:   Admission Assessment Current   Status  Discharge   Goal   Functional Area: Care Score:  Care Score:    Roll Left and Right 3 6 Independent   Sit to Lying 4 6 Independent    Lying to Sitting on Side of Bed 4 6 Independent   Sit to Stand 3 6 Independent   Chair/Bed-to-Chair Transfer 4 6 Independent   Car Transfer 3 6 Independent   Walk 10 Feet 88 88 Not attempted due to medical/safety concerns   Walk 50 Feet with Two Turns 88 88 Not attempted due to medical/safety concerns   Walk 150 Feet 88 88 Not attempted due to medical/safety concerns   Walk 10 Feet Uneven Surface 88 88 Not attempted due to medical/safety concerns   1 Step (Curb) 88 88 Not attempted due to medical/safety concerns   4 Steps 88 88 Not attempted due to medical/safety concerns   12 Steps 88 88 Not attempted due to medical/safety concerns   Picking Up Object 3 6 Set-up/clean-up   Wheel 50 Feet with Two Turns 3 6 Independent   Wheel 150 Feet 3 6 Independent       Reasons for Discontinuation of Therapy Services  Satisfactory goal achievement.      Plan:     Patient Discharged to: Home with Home Health Service.    9/9/2022

## 2022-09-09 NOTE — PLAN OF CARE
This am, pt confirms that Vida will have her ramp complete by today.  Pt will be picked up by her friend, Rakel, at 1100 today.  Rakel will bring pt to her own home.      Alerted [employer]aurab[employer] (delivering RW, w/c, BSC, and shower chair no back) and requested delivery by 1030 today to room.    Alerted [facility] via [facility] re: pt returning to her own home today.  They will provide HH PT/OT/RN services.  Will send AVS later/once completed.    Pharmacy previously confirmed/verified, and pt will have her family retrieve her meds from [email or web address] later today.

## 2022-09-09 NOTE — DISCHARGE SUMMARY
Amy Brooks states that patient's tremors are bothering her and she would like to resume primidone. She would like to switch to pradaxa and suggested by Dr Eran Osborne yesterday. Virginie Coless that patient should wait a day after stopping eliquis and starting pradaxa before resuming primidone. Kaden verbalizes understanding. [facility] ([facility])  Rehab Discharge Summary    Patient Name: Mary Lou Sierra  MRN: [id number]  Age: 72 y.o. Sex: female  : 1950  Hospital Length of Stay: 9 days   Date of Service: 2022      Discharge Information   Date of Admission: 2022  Date of Discharge: 2022  Admit Diagnosis: Left hip fracture         HTN          Normocytic anemia         Anxiety         Insomnia         Constipation         GERD         ETOH dependence          Nicotine dependence  Discharge Diagnosis: Left hip fracture (improved)           HTN (stable)           Normocytic anemia (improved)           Anxiety (stable)           Insomnia (stable)           Constipation (stable)           GERD (stable)           ETOH dependence  (stable)           Nicotine dependence (stable)  COVID-19 testing:  Negative on 2022  COVID-19 vaccination status:  Unvaccinated  Internal Medicine (attending): Luis Singh MD  Physiatry (consulting):  Leo Parada MD     OUTPATIENT PROVIDERS  PCP: ALISHA Asher  Orthopedic surgery:  Silverio Vaughn MD    Hospital Course   71-year-old white female presented to [facility] ED on 2022 complaining of left hip pain after falling down 4 stairs.  PMH significant for HTN, ETOH dependence, nicotine dependence, and anxiety.  Workup significant for left hip fracture.  Tolerated left hip IM nailing on  out perioperative complications.  Tolerated Librium protocol, thiamine and folic acid replacement.  Orthopedic surgery recommended WBAT to LLE and FROM to LLE.  Tolerated transfer to [facility] inpatient rehab unit on  without incident.  During inpatient rehab course Norvasc 5 mg daily initiated 2/2 HTN on .  HCTZ 25 mg daily initiated on .  Staples removed on .  Norvasc increased to 10 mg daily to help with BP control.  Lisinopril 10 mg daily resumed on .  Sleep hygiene, bowel maintenance, and appetite remained at goal.  PT reports  "overall independent.  Car transfers Min assist.  Ramp being built for home.  OT reports overall independent with ADLs.  Vital signs at goal with no recorded fevers.  Lab work unremarkable.  Med reconciliation completed.  Discharge orders initiated.  Stable for transfer home with home health.  To follow-up with scheduled appointments documented below.    Chief Complaint: Left hip fracture s/p IMN on 08/24/2022    /68   Pulse 82   Temp 97.9 °F (36.6 °C) (Oral)   Resp 20   Ht 5' 2.5" (1.588 m)   Wt 72.3 kg (159 lb 6.3 oz)   SpO2 98%   BMI 28.69 kg/m²      Physical Exam  Constitutional:       Appearance: Normal appearance.   HENT:      Head: Normocephalic.      Mouth/Throat:      Mouth: Mucous membranes are moist.   Eyes:      Pupils: Pupils are equal, round, and reactive to light.   Cardiovascular:      Rate and Rhythm: Normal rate and regular rhythm.      Heart sounds: Normal heart sounds.      Comments: 2/6 murmur LCW, 5th ICS, MCL.  LLE 1+ edema  Pulmonary:      Effort: Pulmonary effort is normal.      Breath sounds: Normal breath sounds.   Abdominal:      General: Bowel sounds are normal.      Palpations: Abdomen is soft.   Musculoskeletal:      Cervical back: Neck supple.      Comments: Generalized weakness and muscle atrophy. Left hip dressing clean and intact    Skin:     General: Skin is warm and dry.   Neurological:      General: No focal deficit present.      Mental Status: She is alert and oriented to person, place, and time.   Psychiatric:         Mood and Affect: Mood normal.         Behavior: Behavior normal.         Thought Content: Thought content normal.         Judgment: Judgment normal.   *MD performed and documented physical examination        Labs  Admission on 08/31/2022   Component Date Value Ref Range Status    Sodium Level 09/01/2022 143  136 - 145 mmol/L Final    Potassium Level 09/01/2022 4.1  3.5 - 5.1 mmol/L Final    Chloride 09/01/2022 103  98 - 107 mmol/L Final    Carbon " Dioxide 09/01/2022 30  23 - 31 mmol/L Final    Glucose Level 09/01/2022 102  82 - 115 mg/dL Final    Blood Urea Nitrogen 09/01/2022 10.9  9.8 - 20.1 mg/dL Final    Creatinine 09/01/2022 0.65  0.55 - 1.02 mg/dL Final    Calcium Level Total 09/01/2022 9.4  8.4 - 10.2 mg/dL Final    Protein Total 09/01/2022 6.2  5.8 - 7.6 gm/dL Final    Albumin Level 09/01/2022 2.6 (L)  3.4 - 4.8 gm/dL Final    Globulin 09/01/2022 3.6 (H)  2.4 - 3.5 gm/dL Final    Albumin/Globulin Ratio 09/01/2022 0.7 (L)  1.1 - 2.0 ratio Final    Bilirubin Total 09/01/2022 1.1  <=1.5 mg/dL Final    Alkaline Phosphatase 09/01/2022 107  40 - 150 unit/L Final    Alanine Aminotransferase 09/01/2022 30  0 - 55 unit/L Final    Aspartate Aminotransferase 09/01/2022 29  5 - 34 unit/L Final    eGFR 09/01/2022 >60  mls/min/1.73/m2 Final    Magnesium Level 09/01/2022 2.10  1.60 - 2.60 mg/dL Final    Phosphorus Level 09/01/2022 3.4  2.3 - 4.7 mg/dL Final    Prealbumin 09/01/2022 13.1 (L)  14.0 - 37.0 mg/dL Final    Ferritin Level 09/01/2022 194.44  4.63 - 204.00 ng/mL Final    Iron Binding Capacity Unsaturated 09/01/2022 166  70 - 310 ug/dL Final    Iron Level 09/01/2022 60  50 - 170 ug/dL Final    Transferrin 09/01/2022 205  173 - 360 mg/dL Final    Iron Binding Capacity Total 09/01/2022 226 (L)  250 - 450 ug/dL Final    Iron Saturation 09/01/2022 27  20 - 50 % Final    WBC 09/01/2022 9.4  4.5 - 11.5 x10(3)/mcL Final    RBC 09/01/2022 2.99 (L)  4.20 - 5.40 x10(6)/mcL Final    Hgb 09/01/2022 9.6 (L)  12.0 - 16.0 gm/dL Final    Hct 09/01/2022 29.5 (L)  37.0 - 47.0 % Final    MCV 09/01/2022 98.7 (H)  80.0 - 94.0 fL Final    MCH 09/01/2022 32.1 (H)  27.0 - 31.0 pg Final    MCHC 09/01/2022 32.5 (L)  33.0 - 36.0 mg/dL Final    RDW 09/01/2022 14.6  11.5 - 17.0 % Final    Platelet 09/01/2022 362  130 - 400 x10(3)/mcL Final    MPV 09/01/2022 10.0  7.4 - 10.4 fL Final    Neut % 09/01/2022 52.4  % Final    Lymph % 09/01/2022 33.5  % Final    Mono % 09/01/2022 8.0  % Final     Eos % 09/01/2022 4.4  % Final    Basophil % 09/01/2022 0.5  % Final    Lymph # 09/01/2022 3.16  0.6 - 4.6 x10(3)/mcL Final    Neut # 09/01/2022 4.9  2.1 - 9.2 x10(3)/mcL Final    Mono # 09/01/2022 0.75  0.1 - 1.3 x10(3)/mcL Final    Eos # 09/01/2022 0.41  0 - 0.9 x10(3)/mcL Final    Baso # 09/01/2022 0.05  0 - 0.2 x10(3)/mcL Final    IG# 09/01/2022 0.11 (H)  0 - 0.04 x10(3)/mcL Final    IG% 09/01/2022 1.2  % Final    NRBC% 09/01/2022 0.0  % Final    Prealbumin 09/05/2022 19.5  14.0 - 37.0 mg/dL Final    Sodium Level 09/05/2022 141  136 - 145 mmol/L Final    Potassium Level 09/05/2022 5.3 (H)  3.5 - 5.1 mmol/L Final    Chloride 09/05/2022 103  98 - 107 mmol/L Final    Carbon Dioxide 09/05/2022 28  23 - 31 mmol/L Final    Glucose Level 09/05/2022 112  82 - 115 mg/dL Final    Blood Urea Nitrogen 09/05/2022 9.8  9.8 - 20.1 mg/dL Final    Creatinine 09/05/2022 0.73  0.55 - 1.02 mg/dL Final    Calcium Level Total 09/05/2022 10.0  8.4 - 10.2 mg/dL Final    Protein Total 09/05/2022 7.0  5.8 - 7.6 gm/dL Final    Albumin Level 09/05/2022 3.1 (L)  3.4 - 4.8 gm/dL Final    Globulin 09/05/2022 3.9 (H)  2.4 - 3.5 gm/dL Final    Albumin/Globulin Ratio 09/05/2022 0.8 (L)  1.1 - 2.0 ratio Final    Bilirubin Total 09/05/2022 0.8  <=1.5 mg/dL Final    Alkaline Phosphatase 09/05/2022 166 (H)  40 - 150 unit/L Final    Alanine Aminotransferase 09/05/2022 36  0 - 55 unit/L Final    Aspartate Aminotransferase 09/05/2022 34  5 - 34 unit/L Final    eGFR 09/05/2022 >60  mls/min/1.73/m2 Final    Magnesium Level 09/05/2022 2.40  1.60 - 2.60 mg/dL Final    Phosphorus Level 09/05/2022 4.0  2.3 - 4.7 mg/dL Final    WBC 09/05/2022 8.3  4.5 - 11.5 x10(3)/mcL Final    RBC 09/05/2022 3.28 (L)  4.20 - 5.40 x10(6)/mcL Final    Hgb 09/05/2022 10.8 (L)  12.0 - 16.0 gm/dL Final    Hct 09/05/2022 34.1 (L)  37.0 - 47.0 % Final    MCV 09/05/2022 104.0 (H)  80.0 - 94.0 fL Final    MCH 09/05/2022 32.9 (H)  27.0 - 31.0 pg Final    MCHC 09/05/2022 31.7 (L)   33.0 - 36.0 mg/dL Final    RDW 09/05/2022 16.6  11.5 - 17.0 % Final    Platelet 09/05/2022 505 (H)  130 - 400 x10(3)/mcL Final    MPV 09/05/2022 10.1  7.4 - 10.4 fL Final    Neut % 09/05/2022 51.7  % Final    Lymph % 09/05/2022 34.9  % Final    Mono % 09/05/2022 7.6  % Final    Eos % 09/05/2022 4.2  % Final    Basophil % 09/05/2022 0.8  % Final    Lymph # 09/05/2022 2.91  0.6 - 4.6 x10(3)/mcL Final    Neut # 09/05/2022 4.3  2.1 - 9.2 x10(3)/mcL Final    Mono # 09/05/2022 0.63  0.1 - 1.3 x10(3)/mcL Final    Eos # 09/05/2022 0.35  0 - 0.9 x10(3)/mcL Final    Baso # 09/05/2022 0.07  0 - 0.2 x10(3)/mcL Final    IG# 09/05/2022 0.07 (H)  0 - 0.04 x10(3)/mcL Final    IG% 09/05/2022 0.8  % Final    NRBC% 09/05/2022 0.0  % Final    Sodium Level 09/06/2022 141  136 - 145 mmol/L Final    Potassium Level 09/06/2022 4.7  3.5 - 5.1 mmol/L Final    Chloride 09/06/2022 100  98 - 107 mmol/L Final    Carbon Dioxide 09/06/2022 29  23 - 31 mmol/L Final    Glucose Level 09/06/2022 102  82 - 115 mg/dL Final    Blood Urea Nitrogen 09/06/2022 12.9  9.8 - 20.1 mg/dL Final    Creatinine 09/06/2022 0.71  0.55 - 1.02 mg/dL Final    BUN/Creatinine Ratio 09/06/2022 18   Final    Calcium Level Total 09/06/2022 10.0  8.4 - 10.2 mg/dL Final    Anion Gap 09/06/2022 12.0  mEq/L Final    eGFR 09/06/2022 >60  mls/min/1.73/m2 Final     Radiology  Left femur XR two view on 08/24/2022 at 12:41 p.m., IMPRESSION: There has been interval insertion of an intramedullary alva traversing a fracture of the midshaft and distal aspect of the femur position and alignment of the visualized osseous structures is anatomical multiple orthopedic screws identified in the distal femur    Discharge Summary Plan   Discharge Status: Improved    Location: Discharge home with     Medications: See discharge medicine reconciliation    Activity: as tolerated    Diet: Regular    Instructions:  Take all medications as prescribed.      Attend appointments as scheduled.      Return  to ED if symptoms worsen, or if t > 100.4.    Education: HTN.  Nicotine dependence    Follow-up:  ALISHA Asher on 09/03/2022 at 10:00 a.m.  Silverio Vaughn MD on 09/14/2022 at 8:00 a.m.    Discussed plan of care, and patient communicated understanding. Agreed to comply with recommendations.    Iftikhar Escudero NP conducted independent examination and assisted with medical documentation.     Discharge Time: 48 minutes

## 2022-09-09 NOTE — PT/OT/SLP PROGRESS
Physical Therapy Inpatient Rehab Treatment    Patient Name:  Mary Lou Sierra   MRN:  [id number]    Recommendations:     Discharge Recommendations:  home health PT   Discharge Equipment Recommendations: wheelchair   Barriers to discharge: None    Assessment:     Mary Lou Sierra is a 72 y.o. female admitted with a medical diagnosis of S/p left hip fracture.  She presents with the following impairments/functional limitations:  weakness, impaired endurance, impaired self care skills, impaired functional mobility, gait instability, impaired balance, decreased lower extremity function, pain, orthopedic precautions .    Rehab Diagnosis:     Recent Surgery: * No surgery found *      General Precautions: Standard, fall     Orthopedic Precautions:LLE weight bearing as tolerated (Transfers only. NO AMBULATION.)     Braces: N/A    Rehab Prognosis: Good; patient would benefit from acute skilled PT services to address these deficits and reach maximum level of function.      History:     Past Medical History:   Diagnosis Date    Hypertension        No past surgical history on file.    Subjective     Chief Complaint: Soreness in L Hip  Patient/Family Comments/goals: N/A    Vitals   Vitals at Rest  BP    HR    O2 Sat    Pain 2/10     Vitals With Activity  BP    HR    O2 Sat    Pain      Respiratory Status: Room air    Patients cultural, spiritual, [employer] conflicts given the current situation: no      Objective:     Communicated with RN prior to session.  Patient found up in chair with peripheral IV  upon PT entry to room.    Pt is Oriented x3 and Alert, Cooperative, and Motivated.    Functional Mobility:   Wheelchair propulsion: Pt propelled standard wheelchair x 700 ft on uneven terrains outdoors with bilateral upper extremity with Stand-by Assistance.     Current   Status   Discharge   Goal   Functional Area: Care Score:     Roll Left and Right    Independent   Sit to Lying    Independent   Lying to Sitting on Side of Bed     Independent   Sit to Stand 6 With RW Independent   Chair/Bed-to-Chair Transfer 6 Recliner <> w/c using stand pivot t/f Independent   Car Transfer    Independent   Walk 10 Feet    Not attempted due to medical/safety concerns   Walk 50 Feet with Two Turns    Not attempted due to medical/safety concerns   Walk 150 Feet    Not attempted due to medical/safety concerns   Walk 10 Feet Uneven Surface    Not attempted due to medical/safety concerns   1 Step (Curb)    Not attempted due to medical/safety concerns   4 Steps    Not attempted due to medical/safety concerns   12 Steps    Not attempted due to medical/safety concerns   Picking Up Object    Set-up/clean-up   Wheel 50 Feet with Two Turns 6  Independent   Wheel 150 Feet 6 ~250 ft on even surfaces with BUE propulsion;  Independent         Activity Tolerance Good    Patient left up in chair with all lines intact, call button in reach, and chair alarm on.    Education provided: roles and goals of PT/PTA, transfer training, safety awareness, assistive device, and wheelchair management    Expected compliance:    GOALS:   Multidisciplinary Problems       Physical Therapy Goals          Problem: Physical Therapy    Goal Priority Disciplines Outcome Goal Variances Interventions   Physical Therapy Goal     PT, PT/OT Ongoing, Progressing     Description: Short Term goals      Bed Mobility   Roll Right and Left Setup or Clean-up Assistance . (MET)  Lying to sitting Setup or Clean-up Assistance . (MET)  Sitting to lying Setup or Clean-up Assistance . (MET)  Roll left and right independently (MET)  Sitting to lying independently (MET)  Lying to sitting independently (MET)    Transfers    Pt will be able to perform Stand pivot  chair/bed to chair transfer With RW Setup or Clean-up Assistance . (MET)  Pt will be able to perform Sit to stand with RW Setup or Clean-up Assistance . (MET)  Pt will be able to perform Car transfer with RW Setup or Clean-up Assistance . (MET)  Pt will be  able to perform stand pivot chair/bed to chair transfer with RW independently (MET)  Sit to stand transfer independently using RW  (MET)    Wheelchair Mobility   Pt will be able to propel 500 feet in rubens wheelchair Independent. (MET)  Manual wheelchair 1000 feet independently using Bilateral upper extremity (NOT MET)      Pt will be able to perform Seated HEP independently (MET)  Pt will be able to perform supine HEP independently (MET)      Timeframe: By Re-eval                         Plan:     During this hospitalization, patient to be seen 5 x/week to address the identified rehab impairments via therapeutic activities, therapeutic exercises, wheelchair management/training and progress toward the following goals:    Plan of Care Expires:  09/06/22    Additional Information:     Pt is able to independently propel wheelchair, remove leg rests, and lock/unlock wheelchair safely.     Time Tracking:     PT Received On: 09/09/22  Time In 0930     Time Out 1000  Total Time 30 min  Therapy Time PT Individual: 30  Missed Time:    Time Missed due to:      Billable Minutes: Therapeutic Activity 30 09/09/2022

## 2022-09-14 ENCOUNTER — OFFICE VISIT (OUTPATIENT)
Dept: ORTHOPEDICS | Facility: CLINIC | Age: 72
End: 2022-09-14
Payer: COMMERCIAL

## 2022-09-14 VITALS
BODY MASS INDEX: 28.17 KG/M2 | WEIGHT: 159 LBS | HEART RATE: 81 BPM | DIASTOLIC BLOOD PRESSURE: 70 MMHG | SYSTOLIC BLOOD PRESSURE: 155 MMHG | HEIGHT: 63 IN

## 2022-09-14 DIAGNOSIS — S72.342D CLOSED DISPLACED SPIRAL FRACTURE OF SHAFT OF LEFT FEMUR WITH ROUTINE HEALING: Primary | ICD-10-CM

## 2022-09-14 PROCEDURE — 1160F PR REVIEW ALL MEDS BY PRESCRIBER/CLIN PHARMACIST DOCUMENTED: ICD-10-PCS | Mod: CPTII,,, | Performed by: ORTHOPAEDIC SURGERY

## 2022-09-14 PROCEDURE — 4010F PR ACE/ARB THEARPY RXD/TAKEN: ICD-10-PCS | Mod: CPTII,,, | Performed by: ORTHOPAEDIC SURGERY

## 2022-09-14 PROCEDURE — 3008F BODY MASS INDEX DOCD: CPT | Mod: CPTII,,, | Performed by: ORTHOPAEDIC SURGERY

## 2022-09-14 PROCEDURE — 3008F PR BODY MASS INDEX (BMI) DOCUMENTED: ICD-10-PCS | Mod: CPTII,,, | Performed by: ORTHOPAEDIC SURGERY

## 2022-09-14 PROCEDURE — 3078F DIAST BP <80 MM HG: CPT | Mod: CPTII,,, | Performed by: ORTHOPAEDIC SURGERY

## 2022-09-14 PROCEDURE — 1159F MED LIST DOCD IN RCRD: CPT | Mod: CPTII,,, | Performed by: ORTHOPAEDIC SURGERY

## 2022-09-14 PROCEDURE — 99024 PR POST-OP FOLLOW-UP VISIT: ICD-10-PCS | Mod: ,,, | Performed by: ORTHOPAEDIC SURGERY

## 2022-09-14 PROCEDURE — 99024 POSTOP FOLLOW-UP VISIT: CPT | Mod: ,,, | Performed by: ORTHOPAEDIC SURGERY

## 2022-09-14 PROCEDURE — 3078F PR MOST RECENT DIASTOLIC BLOOD PRESSURE < 80 MM HG: ICD-10-PCS | Mod: CPTII,,, | Performed by: ORTHOPAEDIC SURGERY

## 2022-09-14 PROCEDURE — 3077F PR MOST RECENT SYSTOLIC BLOOD PRESSURE >= 140 MM HG: ICD-10-PCS | Mod: CPTII,,, | Performed by: ORTHOPAEDIC SURGERY

## 2022-09-14 PROCEDURE — 4010F ACE/ARB THERAPY RXD/TAKEN: CPT | Mod: CPTII,,, | Performed by: ORTHOPAEDIC SURGERY

## 2022-09-14 PROCEDURE — 1159F PR MEDICATION LIST DOCUMENTED IN MEDICAL RECORD: ICD-10-PCS | Mod: CPTII,,, | Performed by: ORTHOPAEDIC SURGERY

## 2022-09-14 PROCEDURE — 3077F SYST BP >= 140 MM HG: CPT | Mod: CPTII,,, | Performed by: ORTHOPAEDIC SURGERY

## 2022-09-14 PROCEDURE — 1160F RVW MEDS BY RX/DR IN RCRD: CPT | Mod: CPTII,,, | Performed by: ORTHOPAEDIC SURGERY

## 2022-09-14 RX ORDER — HYDROCHLOROTHIAZIDE 12.5 MG/1
12.5 CAPSULE ORAL DAILY
COMMUNITY
Start: 2022-07-05

## 2022-09-14 NOTE — PROGRESS NOTES
Subjective:       Patient ID: Mary Lou Sierra is a 72 y.o. female.    Chief Complaint   Patient presents with    Left Leg - Post-op Evaluation    Follow-up     3 week post op f/u IMN left femur shaft fx, sx 8/24/22 pt reports feeling good, only has discomfort in left knee ocassionally.         Patient is here for follow-up evaluation 3 weeks status post intramedullary nailing of left femur shaft fracture.  She has been doing very well.  Her staples have been removed.  She ambulates with a walker and is home working with home health.    Follow-up  Pertinent negatives include no abdominal pain, chest pain, chills, congestion, coughing, fever, nausea, neck pain, numbness or vomiting.     Review of Systems   Constitutional: Negative for chills, fever and malaise/fatigue.   HENT:  Negative for congestion and hearing loss.    Eyes:  Negative for visual disturbance.   Cardiovascular:  Negative for chest pain and syncope.   Respiratory:  Negative for cough and shortness of breath.    Hematologic/Lymphatic: Does not bruise/bleed easily.   Skin:  Negative for color change and suspicious lesions.   Musculoskeletal:  Negative for falls and neck pain.   Gastrointestinal:  Negative for abdominal pain, nausea and vomiting.   Genitourinary:  Negative for dysuria and hematuria.   Neurological:  Negative for numbness and sensory change.   Psychiatric/Behavioral:  Negative for altered mental status. The patient is not nervous/anxious.       Current Outpatient Medications on File Prior to Visit   Medication Sig Dispense Refill    ALPRAZolam (XANAX XR) 1 MG Tb24 Take 1 tablet (1 mg total) by mouth nightly as needed (anxiety). 30 tablet 0    amLODIPine (NORVASC) 10 MG tablet Take 1 tablet (10 mg total) by mouth once daily. 30 tablet 0    aspirin (ECOTRIN) 81 MG EC tablet Take 1 tablet (81 mg total) by mouth 2 (two) times a day. 60 tablet 0    benazepril-hydrochlorthiazide (LOTENSIN HCT) 10-12.5 mg Tab Take 1 tablet by mouth once  "daily. 90 tablet 0    EScitalopram oxalate (LEXAPRO) 20 MG tablet Take 1 tablet (20 mg total) by mouth once daily. 30 tablet 2    folic acid (FOLVITE) 1 MG tablet Take 1 tablet (1 mg total) by mouth once daily. 30 tablet 0    multivitamin Tab Take 1 tablet by mouth once daily. 30 tablet 0    thiamine 100 MG tablet Take 1 tablet (100 mg total) by mouth once daily. 30 tablet 0    docusate sodium (COLACE) 100 MG capsule Take 1 capsule (100 mg total) by mouth 2 (two) times daily. for 14 days (Patient not taking: Reported on 9/14/2022) 28 capsule 0    hydroCHLOROthiazide (MICROZIDE) 12.5 mg capsule Take 12.5 mg by mouth once daily.      methocarbamoL (ROBAXIN) 500 MG Tab Take 1 tablet (500 mg total) by mouth 3 (three) times daily. for 14 days (Patient not taking: Reported on 9/14/2022) 42 tablet 0     No current facility-administered medications on file prior to visit.          Objective:      BP (!) 155/70   Pulse 81   Ht 5' 2.5" (1.588 m)   Wt 72.1 kg (159 lb)   BMI 28.62 kg/m²   Physical Exam  Musculoskeletal:      Comments: Left lower extremity:  Surgical incisions are all well healed.  No painful or prominent hardware noted.  She has full active range of motion of the left knee compared to the right.  She does have some stiffness at the terminal ends.  She is able to ambulate well with a walker.  No calf swelling or tenderness, no signs of DVT.  2+ DP pulse.  Good movement of the ankle and digits.      Body mass index is 28.62 kg/m².    Radiology:         Assessment:         1. Closed displaced spiral fracture of shaft of left femur with routine healing                Plan:       She is doing well today and I am happy with her progress.  She can continue weight-bearing as tolerated to left lower extremity.  Continue to work on strengthening and range of motion.  I will have her follow up with me in a month for x-rays of the left femur.  She is happy with this plan of care today and all questions and concerns " were addressed.    Silverio Vaughn MD  Orthopedic Trauma  [facility]      Follow up in about 4 weeks (around 10/12/2022).    Closed displaced spiral fracture of shaft of left femur with routine healing              No orders of the defined types were placed in this encounter.      Future Appointments   Date Time Provider Department Center   10/12/2022  8:30 AM Silverio Vaughn MD [facility]

## 2022-09-15 NOTE — PT/OT/SLP DISCHARGE
Occupational Therapy Discharge Summary    Mary Lou Sierra  MRN: [id number]   Principal Problem: S/p left hip fracture      Patient Discharged from acute Occupational Therapy on 09/09/2022.  Please refer to prior OT note dated 09/08/2022 for functional status.    Assessment:      Patient has met all goals and is not appropriate for therapy.    Objective:     GOALS:   Multidisciplinary Problems       Occupational Therapy Goals          Problem: Occupational Therapy    Goal Priority Disciplines Outcome Interventions   Occupational Therapy Goal     OT, PT/OT Ongoing, Progressing    Description: Pt will perform LB dressing c AE independently  by d/c. MET  Pt will perform toileting Independently by d/c. MET  Pt will perform toilet t/f Independently by d/c. MET    Pt will perform shower t/f c SB assist by d/c. MET    Pt will tolerate standing (following WB precautions) at counter for 10 mins while performing grooming or kitchen activity before D/C.                         Care Scores:   Admission Assessment Current Status Discharge  Goal   Functional Area: Care Score:  Care Score:    Eating 6 6 Independent   Oral Hygiene 5 6 Independent   Toileting Hygiene 6 6 Independent   Shower/Bathe Self 4 6 Independent   Upper Body Dressing 5 6 Independent   Lower Body Dressing 3 6 Independent   Putting On/Taking Off Footwear 3 6 Independent   Toilet Transfer 4 6  Independent     Reasons for Discontinuation of Therapy Services   Satisfactory goal achievement.      Plan:     Patient Discharged to: Home with Home Health Service      9/14/2022

## 2022-10-12 ENCOUNTER — OFFICE VISIT (OUTPATIENT)
Dept: ORTHOPEDICS | Facility: CLINIC | Age: 72
End: 2022-10-12
Payer: COMMERCIAL

## 2022-10-12 ENCOUNTER — HOSPITAL ENCOUNTER (OUTPATIENT)
Dept: RADIOLOGY | Facility: CLINIC | Age: 72
Discharge: HOME | End: 2022-10-12
Attending: ORTHOPAEDIC SURGERY
Payer: COMMERCIAL

## 2022-10-12 VITALS
WEIGHT: 159 LBS | SYSTOLIC BLOOD PRESSURE: 129 MMHG | HEIGHT: 63 IN | BODY MASS INDEX: 28.17 KG/M2 | TEMPERATURE: 97 F | DIASTOLIC BLOOD PRESSURE: 72 MMHG

## 2022-10-12 DIAGNOSIS — S72.342D CLOSED DISPLACED SPIRAL FRACTURE OF SHAFT OF LEFT FEMUR WITH ROUTINE HEALING: ICD-10-CM

## 2022-10-12 DIAGNOSIS — S72.342D CLOSED DISPLACED SPIRAL FRACTURE OF SHAFT OF LEFT FEMUR WITH ROUTINE HEALING: Primary | ICD-10-CM

## 2022-10-12 PROCEDURE — 1159F MED LIST DOCD IN RCRD: CPT | Mod: CPTII,,, | Performed by: NURSE PRACTITIONER

## 2022-10-12 PROCEDURE — 73552 X-RAY EXAM OF FEMUR 2/>: CPT | Mod: LT,,, | Performed by: ORTHOPAEDIC SURGERY

## 2022-10-12 PROCEDURE — 99024 PR POST-OP FOLLOW-UP VISIT: ICD-10-PCS | Mod: ,,, | Performed by: NURSE PRACTITIONER

## 2022-10-12 PROCEDURE — 1159F PR MEDICATION LIST DOCUMENTED IN MEDICAL RECORD: ICD-10-PCS | Mod: CPTII,,, | Performed by: NURSE PRACTITIONER

## 2022-10-12 PROCEDURE — 4010F PR ACE/ARB THEARPY RXD/TAKEN: ICD-10-PCS | Mod: CPTII,,, | Performed by: NURSE PRACTITIONER

## 2022-10-12 PROCEDURE — 1126F PR PAIN SEVERITY QUANTIFIED, NO PAIN PRESENT: ICD-10-PCS | Mod: CPTII,,, | Performed by: NURSE PRACTITIONER

## 2022-10-12 PROCEDURE — 1126F AMNT PAIN NOTED NONE PRSNT: CPT | Mod: CPTII,,, | Performed by: NURSE PRACTITIONER

## 2022-10-12 PROCEDURE — 73552 XR FEMUR 2 VIEW LEFT: ICD-10-PCS | Mod: LT,,, | Performed by: ORTHOPAEDIC SURGERY

## 2022-10-12 PROCEDURE — 3078F PR MOST RECENT DIASTOLIC BLOOD PRESSURE < 80 MM HG: ICD-10-PCS | Mod: CPTII,,, | Performed by: NURSE PRACTITIONER

## 2022-10-12 PROCEDURE — 3008F PR BODY MASS INDEX (BMI) DOCUMENTED: ICD-10-PCS | Mod: CPTII,,, | Performed by: NURSE PRACTITIONER

## 2022-10-12 PROCEDURE — 1160F RVW MEDS BY RX/DR IN RCRD: CPT | Mod: CPTII,,, | Performed by: NURSE PRACTITIONER

## 2022-10-12 PROCEDURE — 3008F BODY MASS INDEX DOCD: CPT | Mod: CPTII,,, | Performed by: NURSE PRACTITIONER

## 2022-10-12 PROCEDURE — 1160F PR REVIEW ALL MEDS BY PRESCRIBER/CLIN PHARMACIST DOCUMENTED: ICD-10-PCS | Mod: CPTII,,, | Performed by: NURSE PRACTITIONER

## 2022-10-12 PROCEDURE — 3074F PR MOST RECENT SYSTOLIC BLOOD PRESSURE < 130 MM HG: ICD-10-PCS | Mod: CPTII,,, | Performed by: NURSE PRACTITIONER

## 2022-10-12 PROCEDURE — 3078F DIAST BP <80 MM HG: CPT | Mod: CPTII,,, | Performed by: NURSE PRACTITIONER

## 2022-10-12 PROCEDURE — 3074F SYST BP LT 130 MM HG: CPT | Mod: CPTII,,, | Performed by: NURSE PRACTITIONER

## 2022-10-12 PROCEDURE — 4010F ACE/ARB THERAPY RXD/TAKEN: CPT | Mod: CPTII,,, | Performed by: NURSE PRACTITIONER

## 2022-10-12 PROCEDURE — 99024 POSTOP FOLLOW-UP VISIT: CPT | Mod: ,,, | Performed by: NURSE PRACTITIONER

## 2022-10-12 RX ORDER — ALPRAZOLAM 1 MG/1
1 TABLET ORAL DAILY PRN
COMMUNITY
Start: 2022-09-30

## 2022-10-12 NOTE — PROGRESS NOTES
Subjective:       Patient ID: Mary Lou Sierra is a 72 y.o. female.    Chief Complaint   Patient presents with    Left Femur - Follow-up    Follow-up     7 week f/u IMN left femur shaft fx, sx 8/24/22, pt doing good no concerns or complaints         The patient is here today for a follow-up evaluation 7 weeks out from intramedullary nailing of left femur shaft fracture.  She states she is doing very well.  She has minimal soreness to the left lower extremity and it is relieved with Tylenol.  She is ambulating with a cane.  She states that she completed her home health physical therapy and has been performing a home exercise program daily and feels as though she is making good progress.  She has not had any fever or incisional issues.  She has no complaints or issues to report today.      Review of Systems   Constitutional: Negative for chills and fever.   HENT:  Negative for congestion and hearing loss.    Eyes:  Negative for visual disturbance.   Cardiovascular:  Negative for chest pain and syncope.   Respiratory:  Negative for cough and shortness of breath.    Hematologic/Lymphatic: Does not bruise/bleed easily.   Skin:  Negative for color change and rash.   Gastrointestinal:  Negative for abdominal pain, nausea and vomiting.   Genitourinary:  Negative for dysuria and hematuria.   Neurological:  Negative for numbness, sensory change and weakness.   Psychiatric/Behavioral:  Negative for altered mental status.       Current Outpatient Medications on File Prior to Visit   Medication Sig Dispense Refill    ALPRAZolam (XANAX) 1 MG tablet Take 1 mg by mouth daily as needed.      benazepril-hydrochlorthiazide (LOTENSIN HCT) 10-12.5 mg Tab Take 1 tablet by mouth once daily. 90 tablet 0    EScitalopram oxalate (LEXAPRO) 20 MG tablet Take 1 tablet (20 mg total) by mouth once daily. 30 tablet 2    amLODIPine (NORVASC) 10 MG tablet Take 1 tablet (10 mg total) by mouth once daily. 30 tablet 0    aspirin (ECOTRIN) 81 MG EC  "tablet Take 1 tablet (81 mg total) by mouth 2 (two) times a day. 60 tablet 0    folic acid (FOLVITE) 1 MG tablet Take 1 tablet (1 mg total) by mouth once daily. 30 tablet 0    hydroCHLOROthiazide (MICROZIDE) 12.5 mg capsule Take 12.5 mg by mouth once daily.       No current facility-administered medications on file prior to visit.          Objective:      /72   Temp 97.3 °F (36.3 °C)   Ht 5' 2.5" (1.588 m)   Wt 72.1 kg (159 lb)   BMI 28.62 kg/m²   Physical Exam  Constitutional:       General: She is not in acute distress.     Appearance: Normal appearance.   HENT:      Head: Normocephalic and atraumatic.      Mouth/Throat:      Mouth: Mucous membranes are moist.   Eyes:      Extraocular Movements: Extraocular movements intact.   Cardiovascular:      Rate and Rhythm: Normal rate.      Pulses: Normal pulses.   Pulmonary:      Effort: Pulmonary effort is normal. No respiratory distress.   Abdominal:      General: There is no distension.      Palpations: Abdomen is soft.      Tenderness: There is no abdominal tenderness.   Musculoskeletal:      Cervical back: Normal range of motion and neck supple.      Comments:   Left lower extremity:  Surgical incisions are well healed with no signs of infection.  She has no painful or prominent hardware.  She has good range of motion of the hip and knee without pain or stiffness.  She has no calf swelling or tenderness. 5/5 motor strength distally with dorsiflexion and plantar flexion.  Brisk capillary refill distally.  Smooth gait with cane.    Neurological:      Mental Status: She is alert and oriented to person, place, and time. Mental status is at baseline.   Psychiatric:         Mood and Affect: Mood normal.         Behavior: Behavior normal.         Thought Content: Thought content normal.         Judgment: Judgment normal.      Body mass index is 28.62 kg/m².    Radiology: ap and lateral xray of left femur demonstrate stable alignment; hardware intact with no " failure or loosening; good interval callous noted at the fracture site.         Assessment:         1. Closed displaced spiral fracture of shaft of left femur with routine healing  X-Ray Femur 2 View Left              Plan:     Patient is doing very well 7 weeks out from IMN L femur fracture. Her xrays demonstrate good bone healing. She has minimal discomfort and has made very good progress with her therapy. She may continue activity as tolerated to left lower extremity. Continue to work on range of motion and strengthening exercises. Continue tylenol as needed for soreness. Follow up in 6 weeks for repeat xrays and evaluation. All questions and concerns were addressed. Patient understands and agrees with the plan of care.     The above findings, diagnosis, and treatment plan were discussed with Dr. Silverio Vaughn who is in agreement.      No follow-ups on file.    Closed displaced spiral fracture of shaft of left femur with routine healing  -     X-Ray Femur 2 View Left; Future; Expected date: 10/12/2022              Orders Placed This Encounter   Procedures    X-Ray Femur 2 View Left     Standing Status:   Future     Number of Occurrences:   1     Standing Expiration Date:   10/6/2023     Order Specific Question:   May the Radiologist modify the order per protocol to meet the clinical needs of the patient?     Answer:   Yes     Order Specific Question:   Release to patient     Answer:   Immediate       Future Appointments   Date Time Provider Department Center   11/23/2022  8:30 AM Silverio Vaughn MD [facility]

## 2022-11-23 ENCOUNTER — HOSPITAL ENCOUNTER (OUTPATIENT)
Dept: RADIOLOGY | Facility: CLINIC | Age: 72
Discharge: HOME | End: 2022-11-23
Attending: ORTHOPAEDIC SURGERY
Payer: COMMERCIAL

## 2022-11-23 ENCOUNTER — OFFICE VISIT (OUTPATIENT)
Dept: ORTHOPEDICS | Facility: CLINIC | Age: 72
End: 2022-11-23
Payer: COMMERCIAL

## 2022-11-23 VITALS
RESPIRATION RATE: 18 BRPM | HEIGHT: 63 IN | WEIGHT: 158.94 LBS | SYSTOLIC BLOOD PRESSURE: 145 MMHG | BODY MASS INDEX: 28.16 KG/M2 | TEMPERATURE: 97 F | HEART RATE: 89 BPM | DIASTOLIC BLOOD PRESSURE: 79 MMHG

## 2022-11-23 DIAGNOSIS — S72.342D CLOSED DISPLACED SPIRAL FRACTURE OF SHAFT OF LEFT FEMUR WITH ROUTINE HEALING: Primary | ICD-10-CM

## 2022-11-23 DIAGNOSIS — S72.342D CLOSED DISPLACED SPIRAL FRACTURE OF SHAFT OF LEFT FEMUR WITH ROUTINE HEALING: ICD-10-CM

## 2022-11-23 PROCEDURE — 99212 OFFICE O/P EST SF 10 MIN: CPT | Mod: ,,, | Performed by: NURSE PRACTITIONER

## 2022-11-23 PROCEDURE — 3078F PR MOST RECENT DIASTOLIC BLOOD PRESSURE < 80 MM HG: ICD-10-PCS | Mod: CPTII,,, | Performed by: NURSE PRACTITIONER

## 2022-11-23 PROCEDURE — 4010F PR ACE/ARB THEARPY RXD/TAKEN: ICD-10-PCS | Mod: CPTII,,, | Performed by: NURSE PRACTITIONER

## 2022-11-23 PROCEDURE — 1160F RVW MEDS BY RX/DR IN RCRD: CPT | Mod: CPTII,,, | Performed by: NURSE PRACTITIONER

## 2022-11-23 PROCEDURE — 1101F PT FALLS ASSESS-DOCD LE1/YR: CPT | Mod: CPTII,,, | Performed by: NURSE PRACTITIONER

## 2022-11-23 PROCEDURE — 3078F DIAST BP <80 MM HG: CPT | Mod: CPTII,,, | Performed by: NURSE PRACTITIONER

## 2022-11-23 PROCEDURE — 73552 X-RAY EXAM OF FEMUR 2/>: CPT | Mod: LT,,, | Performed by: ORTHOPAEDIC SURGERY

## 2022-11-23 PROCEDURE — 1159F PR MEDICATION LIST DOCUMENTED IN MEDICAL RECORD: ICD-10-PCS | Mod: CPTII,,, | Performed by: NURSE PRACTITIONER

## 2022-11-23 PROCEDURE — 1101F PR PT FALLS ASSESS DOC 0-1 FALLS W/OUT INJ PAST YR: ICD-10-PCS | Mod: CPTII,,, | Performed by: NURSE PRACTITIONER

## 2022-11-23 PROCEDURE — 73552 XR FEMUR 2 VIEW LEFT: ICD-10-PCS | Mod: LT,,, | Performed by: ORTHOPAEDIC SURGERY

## 2022-11-23 PROCEDURE — 99212 PR OFFICE/OUTPT VISIT, EST, LEVL II, 10-19 MIN: ICD-10-PCS | Mod: ,,, | Performed by: NURSE PRACTITIONER

## 2022-11-23 PROCEDURE — 3008F PR BODY MASS INDEX (BMI) DOCUMENTED: ICD-10-PCS | Mod: CPTII,,, | Performed by: NURSE PRACTITIONER

## 2022-11-23 PROCEDURE — 1159F MED LIST DOCD IN RCRD: CPT | Mod: CPTII,,, | Performed by: NURSE PRACTITIONER

## 2022-11-23 PROCEDURE — 3288F FALL RISK ASSESSMENT DOCD: CPT | Mod: CPTII,,, | Performed by: NURSE PRACTITIONER

## 2022-11-23 PROCEDURE — 3077F SYST BP >= 140 MM HG: CPT | Mod: CPTII,,, | Performed by: NURSE PRACTITIONER

## 2022-11-23 PROCEDURE — 3288F PR FALLS RISK ASSESSMENT DOCUMENTED: ICD-10-PCS | Mod: CPTII,,, | Performed by: NURSE PRACTITIONER

## 2022-11-23 PROCEDURE — 3008F BODY MASS INDEX DOCD: CPT | Mod: CPTII,,, | Performed by: NURSE PRACTITIONER

## 2022-11-23 PROCEDURE — 3077F PR MOST RECENT SYSTOLIC BLOOD PRESSURE >= 140 MM HG: ICD-10-PCS | Mod: CPTII,,, | Performed by: NURSE PRACTITIONER

## 2022-11-23 PROCEDURE — 1160F PR REVIEW ALL MEDS BY PRESCRIBER/CLIN PHARMACIST DOCUMENTED: ICD-10-PCS | Mod: CPTII,,, | Performed by: NURSE PRACTITIONER

## 2022-11-23 PROCEDURE — 4010F ACE/ARB THERAPY RXD/TAKEN: CPT | Mod: CPTII,,, | Performed by: NURSE PRACTITIONER

## 2022-11-23 NOTE — PROGRESS NOTES
Subjective:       Patient ID: Mary Lou Sierra is a 72 y.o. female.    Chief Complaint   Patient presents with    Left Femur - Follow-up     3 month f/u IMN LEFT FEMUR SHAFT FX, NO COMPLAINTS.         Patient is here today for follow up evaluation 3 months out from intramedullary nailing of left femur shaft fracture. She states she is doing very well. She denies pain. She has not had any incisional complaints. She was discharged from home health PT and has been doing home exercises daily. She is happy with her progress. She is ambulatory with no assistive device and able to perform her normal activities of daily living. No complaints were reported.       Review of Systems   Constitutional: Negative for chills and fever.   HENT:  Negative for congestion and hearing loss.    Eyes:  Negative for visual disturbance.   Cardiovascular:  Negative for chest pain and syncope.   Respiratory:  Negative for cough and shortness of breath.    Hematologic/Lymphatic: Does not bruise/bleed easily.   Skin:  Negative for color change and rash.   Gastrointestinal:  Negative for abdominal pain, nausea and vomiting.   Genitourinary:  Negative for dysuria and hematuria.   Neurological:  Negative for numbness, sensory change and weakness.   Psychiatric/Behavioral:  Negative for altered mental status.       Current Outpatient Medications on File Prior to Visit   Medication Sig Dispense Refill    ALPRAZolam (XANAX) 1 MG tablet Take 1 mg by mouth daily as needed.      benazepril-hydrochlorthiazide (LOTENSIN HCT) 10-12.5 mg Tab Take 1 tablet by mouth once daily. 90 tablet 0    EScitalopram oxalate (LEXAPRO) 20 MG tablet Take 1 tablet (20 mg total) by mouth once daily. 30 tablet 2    hydroCHLOROthiazide (MICROZIDE) 12.5 mg capsule Take 12.5 mg by mouth once daily.      amLODIPine (NORVASC) 10 MG tablet Take 1 tablet (10 mg total) by mouth once daily. 30 tablet 0    aspirin (ECOTRIN) 81 MG EC tablet Take 1 tablet (81 mg total) by mouth 2 (two)  "times a day. 60 tablet 0    folic acid (FOLVITE) 1 MG tablet Take 1 tablet (1 mg total) by mouth once daily. 30 tablet 0     No current facility-administered medications on file prior to visit.          Objective:      BP (!) 145/79 (BP Location: Right arm, Patient Position: Sitting, BP Method: Medium (Automatic))   Pulse 89   Temp 97.4 °F (36.3 °C)   Resp 18   Ht 5' 2.5" (1.588 m)   Wt 72.1 kg (158 lb 15.2 oz)   BMI 28.61 kg/m²   Physical Exam  Constitutional:       General: She is not in acute distress.     Appearance: Normal appearance.   HENT:      Head: Normocephalic and atraumatic.      Mouth/Throat:      Mouth: Mucous membranes are moist.   Eyes:      Extraocular Movements: Extraocular movements intact.   Cardiovascular:      Rate and Rhythm: Normal rate.      Pulses: Normal pulses.   Pulmonary:      Effort: Pulmonary effort is normal. No respiratory distress.   Abdominal:      General: There is no distension.      Palpations: Abdomen is soft.      Tenderness: There is no abdominal tenderness.   Musculoskeletal:      Cervical back: Normal range of motion and neck supple.      Comments: Left lower extremity: no swelling. No deformity. Incisions well healed with no signs of infection. No painful or prominent hardware. Good passive ROM of hip with no pain. Knee ROM 0-130 with no pain. No calf swelling. 5/5 motor strength distally with dorsi/plantar flexion. BCR distally. SLT intact distally.   Neurological:      Mental Status: She is alert and oriented to person, place, and time. Mental status is at baseline.   Psychiatric:         Mood and Affect: Mood normal.         Behavior: Behavior normal.         Thought Content: Thought content normal.         Judgment: Judgment normal.      Body mass index is 28.61 kg/m².    Radiology:  AP and lateral x-ray left femur:  Hardware is intact with no failure or loosening.  Alignment is unchanged.  Good interval callus noted.        Assessment:         1. Closed " displaced spiral fracture of shaft of left femur with routine healing  X-Ray Femur 2 View Left              Plan:     Patient is doing very well 3 months out from intramedullary nailing of left femur fracture.  X-rays demonstrate stable alignment and good interval healing of the bone.  She can continue activity as tolerated to the left lower extremity without restriction.  Continue to work on home exercises for range of motion and strengthening.  Follow-up in 2 months for repeat x-rays and evaluation.  She will most likely be ready for full release at this time.  All questions concerns were addressed.  Patient understands and agrees with the plan of care.    The above findings, diagnosis, and treatment plan were discussed with Dr. Silverio Vaughn who is in agreement.      Follow up in about 2 months (around 1/23/2023).    Closed displaced spiral fracture of shaft of left femur with routine healing  -     X-Ray Femur 2 View Left; Future; Expected date: 11/23/2022              Orders Placed This Encounter   Procedures    X-Ray Femur 2 View Left     Standing Status:   Future     Number of Occurrences:   1     Standing Expiration Date:   11/21/2023     Order Specific Question:   May the Radiologist modify the order per protocol to meet the clinical needs of the patient?     Answer:   Yes     Order Specific Question:   Release to patient     Answer:   Immediate       Future Appointments   Date Time Provider Department Center   1/24/2023  8:30 AM Silverio Vaughn MD [facility]

## 2023-01-24 ENCOUNTER — HOSPITAL ENCOUNTER (OUTPATIENT)
Dept: RADIOLOGY | Facility: CLINIC | Age: 73
Discharge: HOME | End: 2023-01-24
Attending: ORTHOPAEDIC SURGERY
Payer: COMMERCIAL

## 2023-01-24 ENCOUNTER — OFFICE VISIT (OUTPATIENT)
Dept: ORTHOPEDICS | Facility: CLINIC | Age: 73
End: 2023-01-24
Payer: COMMERCIAL

## 2023-01-24 VITALS — WEIGHT: 158 LBS | HEIGHT: 62 IN | BODY MASS INDEX: 29.08 KG/M2

## 2023-01-24 DIAGNOSIS — S72.342D CLOSED DISPLACED SPIRAL FRACTURE OF SHAFT OF LEFT FEMUR WITH ROUTINE HEALING: Primary | ICD-10-CM

## 2023-01-24 DIAGNOSIS — S72.342D CLOSED DISPLACED SPIRAL FRACTURE OF SHAFT OF LEFT FEMUR WITH ROUTINE HEALING: ICD-10-CM

## 2023-01-24 PROCEDURE — 1126F AMNT PAIN NOTED NONE PRSNT: CPT | Mod: CPTII,,, | Performed by: PHYSICIAN ASSISTANT

## 2023-01-24 PROCEDURE — 3008F BODY MASS INDEX DOCD: CPT | Mod: CPTII,,, | Performed by: PHYSICIAN ASSISTANT

## 2023-01-24 PROCEDURE — 1101F PR PT FALLS ASSESS DOC 0-1 FALLS W/OUT INJ PAST YR: ICD-10-PCS | Mod: CPTII,,, | Performed by: PHYSICIAN ASSISTANT

## 2023-01-24 PROCEDURE — 99213 OFFICE O/P EST LOW 20 MIN: CPT | Mod: ,,, | Performed by: PHYSICIAN ASSISTANT

## 2023-01-24 PROCEDURE — 99213 PR OFFICE/OUTPT VISIT, EST, LEVL III, 20-29 MIN: ICD-10-PCS | Mod: ,,, | Performed by: PHYSICIAN ASSISTANT

## 2023-01-24 PROCEDURE — 73552 XR FEMUR 2 VIEW LEFT: ICD-10-PCS | Mod: LT,,, | Performed by: ORTHOPAEDIC SURGERY

## 2023-01-24 PROCEDURE — 1159F PR MEDICATION LIST DOCUMENTED IN MEDICAL RECORD: ICD-10-PCS | Mod: CPTII,,, | Performed by: PHYSICIAN ASSISTANT

## 2023-01-24 PROCEDURE — 3288F PR FALLS RISK ASSESSMENT DOCUMENTED: ICD-10-PCS | Mod: CPTII,,, | Performed by: PHYSICIAN ASSISTANT

## 2023-01-24 PROCEDURE — 1101F PT FALLS ASSESS-DOCD LE1/YR: CPT | Mod: CPTII,,, | Performed by: PHYSICIAN ASSISTANT

## 2023-01-24 PROCEDURE — 73552 X-RAY EXAM OF FEMUR 2/>: CPT | Mod: LT,,, | Performed by: ORTHOPAEDIC SURGERY

## 2023-01-24 PROCEDURE — 1126F PR PAIN SEVERITY QUANTIFIED, NO PAIN PRESENT: ICD-10-PCS | Mod: CPTII,,, | Performed by: PHYSICIAN ASSISTANT

## 2023-01-24 PROCEDURE — 3008F PR BODY MASS INDEX (BMI) DOCUMENTED: ICD-10-PCS | Mod: CPTII,,, | Performed by: PHYSICIAN ASSISTANT

## 2023-01-24 PROCEDURE — 3288F FALL RISK ASSESSMENT DOCD: CPT | Mod: CPTII,,, | Performed by: PHYSICIAN ASSISTANT

## 2023-01-24 PROCEDURE — 1159F MED LIST DOCD IN RCRD: CPT | Mod: CPTII,,, | Performed by: PHYSICIAN ASSISTANT

## 2023-01-24 NOTE — PROGRESS NOTES
"  Subjective:       Patient ID: Mary Lou Sierra is a 72 y.o. female.  Chief Complaint   Patient presents with    Left Femur - Follow-up     5 month f/u from imn left femur fx. No complaints.         HPI    Patient presents for 5 month follow up IMN left femur. States she is doing well overall and doing most activities without restriction at this time. She has finished formal physical therapy and is doing home exercises at this time. She has no acute complaints overall and is doing very well.     ROS:  Constitutional: Denies fever chills  Eyes: No change in vision  ENT: No ringing or current infections  CV: No chest pain  Resp: No labored breathing  MSK: Pain evident at site of injury located in HPI,   Integ: No signs of abrasions or lacerations  Neuro: No numbness or tingling  Lymphatic: No swelling outside the area of injury     Current Outpatient Medications on File Prior to Visit   Medication Sig Dispense Refill    ALPRAZolam (XANAX) 1 MG tablet Take 1 mg by mouth daily as needed.      hydroCHLOROthiazide (MICROZIDE) 12.5 mg capsule Take 12.5 mg by mouth once daily.      amLODIPine (NORVASC) 10 MG tablet Take 1 tablet (10 mg total) by mouth once daily. 30 tablet 0    aspirin (ECOTRIN) 81 MG EC tablet Take 1 tablet (81 mg total) by mouth 2 (two) times a day. 60 tablet 0    benazepril-hydrochlorthiazide (LOTENSIN HCT) 10-12.5 mg Tab Take 1 tablet by mouth once daily. 90 tablet 0    EScitalopram oxalate (LEXAPRO) 20 MG tablet Take 1 tablet (20 mg total) by mouth once daily. 30 tablet 2    folic acid (FOLVITE) 1 MG tablet Take 1 tablet (1 mg total) by mouth once daily. 30 tablet 0     No current facility-administered medications on file prior to visit.          Objective:      Ht 5' 2" (1.575 m)   Wt 71.7 kg (158 lb)   LMP  (LMP Unknown)   BMI 28.90 kg/m²   Physical Exam  General the patient is alert and oriented x3 no acute distress nontoxic-appearing appropriate affect.    Constitutional: Vital signs are " examined and stable.  Resp: No signs of labored breathing    Musculoskeletal:     Left lower extremity: incisions well healed and matured; compartments are soft and compressible; No pain with ROM at the hip, knee, or ankle; appropriate tenderness to palpation; dorsi/plantar flexes the foot; SILT distally; BCR distally; DP pulse palpable      Body mass index is 28.9 kg/m².  Ideal body weight: 50.1 kg (110 lb 7.2 oz)  Adjusted ideal body weight: 58.7 kg (129 lb 7.5 oz)  No results found for: HGBA1C  Hgb   Date Value Ref Range Status   09/05/2022 10.8 (L) 12.0 - 16.0 gm/dL Final   09/01/2022 9.6 (L) 12.0 - 16.0 gm/dL Final     Hct   Date Value Ref Range Status   09/05/2022 34.1 (L) 37.0 - 47.0 % Final   09/01/2022 29.5 (L) 37.0 - 47.0 % Final     Iron Level   Date Value Ref Range Status   09/01/2022 60 50 - 170 ug/dL Final     No components found for: FROLATE  No results found for: DDZXQIQV60TH  WBC   Date Value Ref Range Status   09/05/2022 8.3 4.5 - 11.5 x10(3)/mcL Final   09/01/2022 9.4 4.5 - 11.5 x10(3)/mcL Final       Radiology:   3  view x ray of the left femur: hardware intact without signs of loosening or failure. Continued bony consolidation noted as compared to previous images. Consistent with routine healing.         Assessment:         1. Closed displaced spiral fracture of shaft of left femur with routine healing  X-Ray Femur 2 View Left              Plan:         Follow up if symptoms worsen or fail to improve.    Mary Lou was seen today for follow-up.    Diagnoses and all orders for this visit:    Closed displaced spiral fracture of shaft of left femur with routine healing  -     X-Ray Femur 2 View Left; Future        -Continue full weight bearing and range of motion as tolerated. Continue therapy at this time for strengthening and range of  motion exercises as needed. May continue home exercises in place of this as she chooses   -Her fracture appears to have gone on to full union and she is doing well  overall with no acute complaints. We will follow up with her as needed if she has any concerns in the future.  -ED precautions given    The above findings, diagnostics, and treatment plan were discussed with Dr. Vaughn who is in agreement with the plan of care except as stated in additional documentation.       Alyx Keller PA-C          No future appointments.

## 2024-11-10 ENCOUNTER — OFFICE VISIT (OUTPATIENT)
Dept: URGENT CARE | Facility: CLINIC | Age: 74
End: 2024-11-10
Payer: COMMERCIAL

## 2024-11-10 VITALS
TEMPERATURE: 98 F | WEIGHT: 158 LBS | RESPIRATION RATE: 18 BRPM | HEIGHT: 62 IN | OXYGEN SATURATION: 96 % | BODY MASS INDEX: 29.08 KG/M2 | DIASTOLIC BLOOD PRESSURE: 83 MMHG | HEART RATE: 91 BPM | SYSTOLIC BLOOD PRESSURE: 171 MMHG

## 2024-11-10 DIAGNOSIS — M54.9 BACK PAIN, UNSPECIFIED BACK LOCATION, UNSPECIFIED BACK PAIN LATERALITY, UNSPECIFIED CHRONICITY: ICD-10-CM
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
## 2024-11-10 DIAGNOSIS — R39.15 URINARY URGENCY: ICD-10-CM

## 2024-11-10 DIAGNOSIS — S22.000A COMPRESSION FRACTURE OF THORACIC VERTEBRA, UNSPECIFIED THORACIC VERTEBRAL LEVEL, INITIAL ENCOUNTER: Primary | ICD-10-CM

## 2024-11-10 RX ORDER — LEVOTHYROXINE SODIUM 25 UG/1
25 TABLET ORAL
COMMUNITY
Start: 2024-11-10

## 2024-11-10 RX ORDER — DIAZEPAM 10 MG/1
10 TABLET ORAL 2 TIMES DAILY PRN
COMMUNITY
Start: 2024-06-28

## 2024-11-10 NOTE — PROGRESS NOTES
"Subjective:      Patient ID: Mary Lou Sierra is a 74 y.o. female.    Vitals:  height is 5' 2" (1.575 m) and weight is 71.7 kg (158 lb). Her temperature is 97.8 °F (36.6 °C). Her blood pressure is 171/83 (abnormal) and her pulse is 91. Her respiration is 18 and oxygen saturation is 96%.     Chief Complaint: Back Pain    74 y.o. female presents to urgent care with bilateral mid to lower back pain x1wk, frequent urinating , urinary urgency over the last few days.  Patient reports she tripped and fell about 1 week ago, has been having back pain since that time.  Patient walks with a cane.  She reports she fell onto her lower back, ever since having hip surgery a few years ago she does not have the strength to pull herself up and she finds she strained her back when attempting to pick herself up off the floor.  Alleviating factors used include Ibuprofen with no relief.  Is on aspirin daily denies any further  blood thinner use.  Denies hitting head, loss of consciousness, pain radiating, numbness/tingling or weakness in legs, hematuria, dysuria, bowel or bladder dysfunction, flank pain, abdominal/pelvic pain, urinary/bowel incontinence.  She does report she has had back pain, similar symptoms in the past and it was due to UTI, kidney infection.  Would like to rule out UTI.    Back Pain      Musculoskeletal:  Positive for back pain.      Objective:     Physical Exam   Constitutional: She is oriented to person, place, and time. She appears well-developed. She is cooperative. No distress.   HENT:   Head: Normocephalic and atraumatic.   Nose: Nose normal.   Mouth/Throat: Oropharynx is clear and moist and mucous membranes are normal.   Eyes: Conjunctivae and lids are normal.   Neck: Trachea normal and phonation normal. Neck supple.   Cardiovascular: Normal rate and normal pulses.   Pulmonary/Chest: Effort normal.   Abdominal: Normal appearance and bowel sounds are normal. She exhibits no mass. Soft. There is no left CVA " tenderness and no right CVA tenderness.   Musculoskeletal:         General: No deformity.      Cervical back: She exhibits no bony tenderness.      Thoracic back: She exhibits tenderness and bony tenderness.      Lumbar back: She exhibits tenderness and bony tenderness.      Comments: Bilateral lower and mid back tenderness, some central bony tenderness however there is bilateral paraspinous muscular tenderness present   Neurological: She is alert and oriented to person, place, and time. She has normal strength and normal reflexes. No sensory deficit.   Skin: Skin is warm, dry, intact and not diaphoretic.   Psychiatric: Her speech is normal and behavior is normal. Judgment and thought content normal.   Nursing note and vitals reviewed.       Previous History      Review of patient's allergies indicates:  No Known Allergies    Past Medical History:   Diagnosis Date    Hypertension     Thyroid disease      Current Outpatient Medications   Medication Instructions    ALPRAZolam (XANAX) 1 mg, Daily PRN    amLODIPine (NORVASC) 10 mg, Oral, Daily    aspirin (ECOTRIN) 81 mg, Oral, 2 times daily    benazepril-hydrochlorthiazide (LOTENSIN HCT) 10-12.5 mg Tab 1 tablet, Oral, Daily    diazePAM (VALIUM) 10 mg, 2 times daily PRN    EScitalopram oxalate (LEXAPRO) 20 mg, Oral, Daily    folic acid (FOLVITE) 1 mg, Oral, Daily    hydroCHLOROthiazide (MICROZIDE) 12.5 mg, Daily    levothyroxine (SYNTHROID) 25 mcg     Past Surgical History:   Procedure Laterality Date    INTRAMEDULLARY RODDING OF FEMUR Left 8/24/2022    Procedure: INSERTION, INTRAMEDULLARY TERENCE, FEMUR;  Surgeon: Silverio Vaughn MD;  Location: [facility];  Service: Orthopedics;  Laterality: Left;     Family History   Problem Relation Name Age of Onset    No Known Problems Mother      No Known Problems Father      No Known Problems Sister         Social History     Tobacco Use    Smoking status: Every Day     Current packs/day: 0.50     Types: Cigarettes    Smokeless tobacco:  "Never    Tobacco comments:     Smokes 3-4 cigarettes a day.   Substance Use Topics    Alcohol use: Not Currently    Drug use: Never        Physical Exam      Vital Signs Reviewed   BP (!) 171/83   Pulse 91   Temp 97.8 °F (36.6 °C)   Resp 18   Ht 5' 2" (1.575 m)   Wt 71.7 kg (158 lb)   SpO2 96%   BMI 28.90 kg/m²        Procedures    Procedures     Labs     Results for orders placed or performed during the hospital encounter of 08/31/22   Comprehensive metabolic panel    Collection Time: 09/01/22  5:39 AM   Result Value Ref Range    Sodium 143 136 - 145 mmol/L    Potassium 4.1 3.5 - 5.1 mmol/L    Chloride 103 98 - 107 mmol/L    CO2 30 23 - 31 mmol/L    Glucose 102 82 - 115 mg/dL    Blood Urea Nitrogen 10.9 9.8 - 20.1 mg/dL    Creatinine 0.65 0.55 - 1.02 mg/dL    Calcium 9.4 8.4 - 10.2 mg/dL    Protein Total 6.2 5.8 - 7.6 gm/dL    Albumin 2.6 (L) 3.4 - 4.8 gm/dL    Globulin 3.6 (H) 2.4 - 3.5 gm/dL    Albumin/Globulin Ratio 0.7 (L) 1.1 - 2.0 ratio    Bilirubin Total 1.1 <=1.5 mg/dL     40 - 150 unit/L    ALT 30 0 - 55 unit/L    AST 29 5 - 34 unit/L    eGFR >60 mls/min/1.73/m2   Magnesium    Collection Time: 09/01/22  5:39 AM   Result Value Ref Range    Magnesium Level 2.10 1.60 - 2.60 mg/dL   Phosphorus    Collection Time: 09/01/22  5:39 AM   Result Value Ref Range    Phosphorus Level 3.4 2.3 - 4.7 mg/dL   Prealbumin    Collection Time: 09/01/22  5:39 AM   Result Value Ref Range    Prealbumin 13.1 (L) 14.0 - 37.0 mg/dL   Ferritin    Collection Time: 09/01/22  5:39 AM   Result Value Ref Range    Ferritin Level 194.44 4.63 - 204.00 ng/mL   Iron and TIBC    Collection Time: 09/01/22  5:39 AM   Result Value Ref Range    Iron Binding Capacity Unsaturated 166 70 - 310 ug/dL    Iron Level 60 50 - 170 ug/dL    Transferrin 205 173 - 360 mg/dL    Iron Binding Capacity Total 226 (L) 250 - 450 ug/dL    Iron Saturation 27 20 - 50 %   CBC with Differential    Collection Time: 09/01/22  5:39 AM   Result Value Ref Range "    WBC 9.4 4.5 - 11.5 x10(3)/mcL    RBC 2.99 (L) 4.20 - 5.40 x10(6)/mcL    Hgb 9.6 (L) 12.0 - 16.0 gm/dL    Hct 29.5 (L) 37.0 - 47.0 %    MCV 98.7 (H) 80.0 - 94.0 fL    MCH 32.1 (H) 27.0 - 31.0 pg    MCHC 32.5 (L) 33.0 - 36.0 mg/dL    RDW 14.6 11.5 - 17.0 %    Platelet 362 130 - 400 x10(3)/mcL    MPV 10.0 7.4 - 10.4 fL    Neut % 52.4 %    Lymph % 33.5 %    Mono % 8.0 %    Eos % 4.4 %    Basophil % 0.5 %    Lymph # 3.16 0.6 - 4.6 x10(3)/mcL    Neut # 4.9 2.1 - 9.2 x10(3)/mcL    Mono # 0.75 0.1 - 1.3 x10(3)/mcL    Eos # 0.41 0 - 0.9 x10(3)/mcL    Baso # 0.05 0 - 0.2 x10(3)/mcL    IG# 0.11 (H) 0 - 0.04 x10(3)/mcL    IG% 1.2 %    NRBC% 0.0 %   Prealbumin    Collection Time: 09/05/22  5:41 AM   Result Value Ref Range    Prealbumin 19.5 14.0 - 37.0 mg/dL   Comprehensive Metabolic Panel    Collection Time: 09/05/22  5:41 AM   Result Value Ref Range    Sodium 141 136 - 145 mmol/L    Potassium 5.3 (H) 3.5 - 5.1 mmol/L    Chloride 103 98 - 107 mmol/L    CO2 28 23 - 31 mmol/L    Glucose 112 82 - 115 mg/dL    Blood Urea Nitrogen 9.8 9.8 - 20.1 mg/dL    Creatinine 0.73 0.55 - 1.02 mg/dL    Calcium 10.0 8.4 - 10.2 mg/dL    Protein Total 7.0 5.8 - 7.6 gm/dL    Albumin 3.1 (L) 3.4 - 4.8 gm/dL    Globulin 3.9 (H) 2.4 - 3.5 gm/dL    Albumin/Globulin Ratio 0.8 (L) 1.1 - 2.0 ratio    Bilirubin Total 0.8 <=1.5 mg/dL     (H) 40 - 150 unit/L    ALT 36 0 - 55 unit/L    AST 34 5 - 34 unit/L    eGFR >60 mls/min/1.73/m2   Magnesium    Collection Time: 09/05/22  5:41 AM   Result Value Ref Range    Magnesium Level 2.40 1.60 - 2.60 mg/dL   Phosphorus    Collection Time: 09/05/22  5:41 AM   Result Value Ref Range    Phosphorus Level 4.0 2.3 - 4.7 mg/dL   CBC with Differential    Collection Time: 09/05/22  5:41 AM   Result Value Ref Range    WBC 8.3 4.5 - 11.5 x10(3)/mcL    RBC 3.28 (L) 4.20 - 5.40 x10(6)/mcL    Hgb 10.8 (L) 12.0 - 16.0 gm/dL    Hct 34.1 (L) 37.0 - 47.0 %    .0 (H) 80.0 - 94.0 fL    MCH 32.9 (H) 27.0 - 31.0 pg     MCHC 31.7 (L) 33.0 - 36.0 mg/dL    RDW 16.6 11.5 - 17.0 %    Platelet 505 (H) 130 - 400 x10(3)/mcL    MPV 10.1 7.4 - 10.4 fL    Neut % 51.7 %    Lymph % 34.9 %    Mono % 7.6 %    Eos % 4.2 %    Basophil % 0.8 %    Lymph # 2.91 0.6 - 4.6 x10(3)/mcL    Neut # 4.3 2.1 - 9.2 x10(3)/mcL    Mono # 0.63 0.1 - 1.3 x10(3)/mcL    Eos # 0.35 0 - 0.9 x10(3)/mcL    Baso # 0.07 0 - 0.2 x10(3)/mcL    IG# 0.07 (H) 0 - 0.04 x10(3)/mcL    IG% 0.8 %    NRBC% 0.0 %   Basic Metabolic Panel    Collection Time: 09/06/22  5:10 AM   Result Value Ref Range    Sodium 141 136 - 145 mmol/L    Potassium 4.7 3.5 - 5.1 mmol/L    Chloride 100 98 - 107 mmol/L    CO2 29 23 - 31 mmol/L    Glucose 102 82 - 115 mg/dL    Blood Urea Nitrogen 12.9 9.8 - 20.1 mg/dL    Creatinine 0.71 0.55 - 1.02 mg/dL    BUN/Creatinine Ratio 18     Calcium 10.0 8.4 - 10.2 mg/dL    Anion Gap 12.0 mEq/L    eGFR >60 mls/min/1.73/m2         XR THORACIC SPINE AP LATERAL  Order: [id number]  Status: Final result       Visible to patient: No (inaccessible in MyChart)       Next appt: None       Dx: Back pain, unspecified back location,...    0 Result Notes  Details    Reading Physician Reading Date Result Priority   Saad Wagoner MD  [phone] 11/10/2024 STAT     Narrative & Impression  EXAMINATION:  XR THORACIC SPINE AP LATERAL     CLINICAL HISTORY:  fall 1 week ago, back pain; Dorsalgia, unspecified     TECHNIQUE:  AP and lateral radiographs of the thoracic spine     COMPARISON:  No relevant comparison studies available at the time of dictation     FINDINGS:  Suboptimal visualization of the vertebral bodies due to decreased bone mineralization.  Near vertebral plana of the T9 vertebral body.  Additional anterior compression deformities of the T11 and T12 vertebral bodies with height loss up to 50%.  Difficult to evaluate T9 alignment on these images.     Impression:     Near vertebral plana of T9 vertebral body with additional compression deformities of the T11 and T12  vertebral bodies of indeterminate age.  Due to the limited visualization in regard to thoracic spine alignment, CT can be considered for further assessment depending on severity of patient's symptoms.        Electronically signed by:Saad Wagoner  Date:                                            11/10/2024  Time:                                           16:43   Narrative & Impression    EXAMINATION:  XR LUMBAR SPINE 2 OR 3 VIEWS     CLINICAL HISTORY:  fall 1 week ago, back pain; Dorsalgia, unspecified     TECHNIQUE:  Frontal and lateral radiographs of the lumbar spine     COMPARISON:  No relevant comparison studies available at the time of dictation.     FINDINGS:  For the purposes of this report, there are 5 lumbar vertebral bodies. Mild L1 superior endplate height loss of about 20% centrally.  Minimal retrolisthesis of T12 on L1 and L1 on L2.  Moderate degenerative changes in the lower lumbar spine.     Impression:     Age-indeterminate mild compression deformity of the L1 superior endplate.  Grade 1 retrolisthesis at T12-L1 and L1-2.        Electronically signed by:Saad Wagoner  Date:                                            11/10/2024     Assessment:     1. Compression fracture of thoracic vertebra, unspecified thoracic vertebral level, initial encounter    2. Back pain, unspecified back location, unspecified back pain laterality, unspecified chronicity    3. Urinary urgency    4. Fall, initial encounter        Plan:       Compression fracture of thoracic vertebra, unspecified thoracic vertebral level, initial encounter  -     Refer to Emergency Dept.    Back pain, unspecified back location, unspecified back pain laterality, unspecified chronicity  -     POCT Urinalysis, Dipstick, Manual, W/O Scope  -     XR THORACIC SPINE AP LATERAL; Future; Expected date: 11/10/2024  -     XR LUMBAR SPINE 2 OR 3 VIEWS; Future; Expected date: 11/10/2024  -     Refer to Emergency Dept.    Urinary urgency    Fall, initial  encounter              Attempted to collect urine sample in clinic, patient unable to collect, missed the nun's cap in the toilet multiple times.  Patient is in clinic after close, reports she will return to clinic tomorrow with urine sample. Await possible treatment/ orders per provider on shift tomorrow.    However based on x-ray results, back pain, recent fall, suggested patient follow up in the emergency room for further workup for possible CT due to compression deformities, vertebral body deformities of T9, T11-T12, L1 vertebrae found on x-ray.      As discussed, it is recommended that you present to the ER now for further evaluation to prevent a delay in care.     Opts for private transport via personal vehicle.

## 2025-01-26 ENCOUNTER — OFFICE VISIT (OUTPATIENT)
Dept: URGENT CARE | Facility: CLINIC | Age: 75
End: 2025-01-26
Payer: COMMERCIAL

## 2025-01-26 VITALS
HEIGHT: 62 IN | BODY MASS INDEX: 28.59 KG/M2 | TEMPERATURE: 98 F | HEART RATE: 103 BPM | DIASTOLIC BLOOD PRESSURE: 82 MMHG | SYSTOLIC BLOOD PRESSURE: 141 MMHG | OXYGEN SATURATION: 97 % | RESPIRATION RATE: 20 BRPM | WEIGHT: 155.38 LBS

## 2025-01-26 DIAGNOSIS — R07.81 RIB PAIN ON RIGHT SIDE: Primary | ICD-10-CM

## 2025-01-26 PROCEDURE — 99214 OFFICE O/P EST MOD 30 MIN: CPT | Mod: ,,, | Performed by: FAMILY MEDICINE

## 2025-01-26 RX ORDER — PREDNISONE 20 MG/1
20 TABLET ORAL 2 TIMES DAILY
Qty: 10 TABLET | Refills: 0 | Status: SHIPPED | OUTPATIENT
Start: 2025-01-26 | End: 2025-01-31

## 2025-01-26 RX ORDER — KETOROLAC TROMETHAMINE 10 MG/1
10 TABLET, FILM COATED ORAL 4 TIMES DAILY PRN
COMMUNITY
Start: 2025-01-20

## 2025-01-26 RX ORDER — NAPROXEN 500 MG/1
500 TABLET ORAL 2 TIMES DAILY WITH MEALS
Qty: 14 TABLET | Refills: 0 | Status: SHIPPED | OUTPATIENT
Start: 2025-01-26 | End: 2025-02-02

## 2025-01-26 RX ORDER — LIDOCAINE 50 MG/G
PATCH TOPICAL
COMMUNITY
Start: 2025-01-20

## 2025-01-26 NOTE — PROGRESS NOTES
"Subjective:      Patient ID: Mary Lou Sierra is a 74 y.o. female.    Vitals:  height is 5' 2" (1.575 m) and weight is 70.5 kg (155 lb 6.4 oz). Her oral temperature is 97.6 °F (36.4 °C). Her blood pressure is 141/82 (abnormal) and her pulse is 103. Her respiration is 20 and oxygen saturation is 97%.     Chief Complaint: Chest Pain     Patient is a 74 y.o. female who presents to urgent care with complaints of right sided rib pain x11 days. Alleviating factors include Celestone injection, Toradol injection, oral Toradol with no relief. Patient denies chest pain. Pt was diagnosed with muscular pain 1/20/25 at the [facility].  She said that after the Celestone injection, she had relief for about 3 days, then the pain has returned.  She has been taking the oral Toradol without symptom relief.  Pain starts underneath her right breast and radiates underneath the left breast.  Pain is worse when moving from a sitting to a standing position.  Pain is not affected by deep breathing.  She says pain was originally radiating to her back but that has resolved.         Constitution: Negative for chills, sweating, fatigue and fever.   HENT:  Negative for ear pain, ear discharge, congestion, postnasal drip, sinus pain, sinus pressure and sore throat.    Neck: Negative for neck pain and neck stiffness.   Cardiovascular:  Negative for chest trauma, chest pain, leg swelling, palpitations, sob on exertion and passing out.   Eyes: Negative.    Respiratory: Negative.     Gastrointestinal: Negative.  Negative for nausea, vomiting, constipation and diarrhea.   Endocrine: negative.   Musculoskeletal:  Positive for pain. Negative for trauma.   Skin: Negative.    Neurological:  Negative for disorientation and altered mental status.   Hematologic/Lymphatic: Negative.    Psychiatric/Behavioral:  Negative for altered mental status, disorientation and confusion.       Objective:     Physical Exam   Constitutional: She is " oriented to person, place, and time. She appears well-developed. She is cooperative. No distress.   HENT:   Head: Normocephalic and atraumatic.   Nose: Nose normal.   Mouth/Throat: Oropharynx is clear and moist and mucous membranes are normal.   Eyes: Conjunctivae and lids are normal.   Neck: Trachea normal and phonation normal. Neck supple.   Cardiovascular: Normal rate, regular rhythm, normal heart sounds and normal pulses.   Pulmonary/Chest: Effort normal and breath sounds normal. She exhibits no mass, no tenderness, no bony tenderness, no crepitus, no edema and no swelling. Right breast exhibits no tenderness. No breast swelling, tenderness or discharge.       Abdominal: Normal appearance and bowel sounds are normal. She exhibits no mass. Soft.   Genitourinary: No breast swelling, tenderness or discharge.   Musculoskeletal:         General: No swelling, tenderness or deformity.   Neurological: She is alert and oriented to person, place, and time. She has normal strength and normal reflexes. No sensory deficit.   Skin: Skin is warm, dry, intact and not diaphoretic.   Psychiatric: Her speech is normal and behavior is normal. Judgment and thought content normal.   Nursing note and vitals reviewed.         Previous History      Review of patient's allergies indicates:  No Known Allergies    Past Medical History:   Diagnosis Date    Anxiety     Depression     Hypertension     Thyroid disease      Current Outpatient Medications   Medication Instructions    ALPRAZolam (XANAX) 1 mg, Daily PRN    amLODIPine (NORVASC) 10 mg, Oral, Daily    aspirin (ECOTRIN) 81 mg, Oral, 2 times daily    benazepril-hydrochlorthiazide (LOTENSIN HCT) 10-12.5 mg Tab 1 tablet, Oral, Daily    diazePAM (VALIUM) 10 mg, 2 times daily PRN    EScitalopram oxalate (LEXAPRO) 20 mg, Oral, Daily    hydroCHLOROthiazide (MICROZIDE) 12.5 mg, Daily    ketorolac (TORADOL) 10 mg, 4 times daily PRN    levothyroxine (SYNTHROID) 25 mcg    LIDOcaine (LIDODERM) 5  "% SMARTSI Patch(s) Topical    naproxen (NAPROSYN) 500 mg, Oral, 2 times daily with meals    predniSONE (DELTASONE) 20 mg, Oral, 2 times daily     Past Surgical History:   Procedure Laterality Date    BACK SURGERY      INTRAMEDULLARY RODDING OF FEMUR Left 2022    Procedure: INSERTION, INTRAMEDULLARY TERENCE, FEMUR;  Surgeon: Silverio Vaughn MD;  Location: [facility];  Service: Orthopedics;  Laterality: Left;     Family History   Problem Relation Name Age of Onset    No Known Problems Mother      No Known Problems Father      No Known Problems Sister         Social History     Tobacco Use    Smoking status: Every Day     Current packs/day: 0.50     Types: Cigarettes    Smokeless tobacco: Never    Tobacco comments:     Smokes 3-4 cigarettes a day.   Substance Use Topics    Alcohol use: Not Currently    Drug use: Never        Physical Exam      Vital Signs Reviewed   BP (!) 141/82 (BP Location: Right arm, Patient Position: Sitting)   Pulse 103   Temp 97.6 °F (36.4 °C) (Oral)   Resp 20   Ht 5' 2" (1.575 m)   Wt 70.5 kg (155 lb 6.4 oz)   LMP  (LMP Unknown)   SpO2 97%   BMI 28.42 kg/m²        Procedures    Procedures     Labs     Results for orders placed or performed during the hospital encounter of 22   Comprehensive metabolic panel    Collection Time: 22  5:39 AM   Result Value Ref Range    Sodium 143 136 - 145 mmol/L    Potassium 4.1 3.5 - 5.1 mmol/L    Chloride 103 98 - 107 mmol/L    CO2 30 23 - 31 mmol/L    Glucose 102 82 - 115 mg/dL    Blood Urea Nitrogen 10.9 9.8 - 20.1 mg/dL    Creatinine 0.65 0.55 - 1.02 mg/dL    Calcium 9.4 8.4 - 10.2 mg/dL    Protein Total 6.2 5.8 - 7.6 gm/dL    Albumin 2.6 (L) 3.4 - 4.8 gm/dL    Globulin 3.6 (H) 2.4 - 3.5 gm/dL    Albumin/Globulin Ratio 0.7 (L) 1.1 - 2.0 ratio    Bilirubin Total 1.1 <=1.5 mg/dL     40 - 150 unit/L    ALT 30 0 - 55 unit/L    AST 29 5 - 34 unit/L    eGFR >60 mls/min/1.73/m2   Magnesium    Collection Time: 22  5:39 AM "   Result Value Ref Range    Magnesium Level 2.10 1.60 - 2.60 mg/dL   Phosphorus    Collection Time: 09/01/22  5:39 AM   Result Value Ref Range    Phosphorus Level 3.4 2.3 - 4.7 mg/dL   Prealbumin    Collection Time: 09/01/22  5:39 AM   Result Value Ref Range    Prealbumin 13.1 (L) 14.0 - 37.0 mg/dL   Ferritin    Collection Time: 09/01/22  5:39 AM   Result Value Ref Range    Ferritin Level 194.44 4.63 - 204.00 ng/mL   Iron and TIBC    Collection Time: 09/01/22  5:39 AM   Result Value Ref Range    Iron Binding Capacity Unsaturated 166 70 - 310 ug/dL    Iron Level 60 50 - 170 ug/dL    Transferrin 205 173 - 360 mg/dL    Iron Binding Capacity Total 226 (L) 250 - 450 ug/dL    Iron Saturation 27 20 - 50 %   CBC with Differential    Collection Time: 09/01/22  5:39 AM   Result Value Ref Range    WBC 9.4 4.5 - 11.5 x10(3)/mcL    RBC 2.99 (L) 4.20 - 5.40 x10(6)/mcL    Hgb 9.6 (L) 12.0 - 16.0 gm/dL    Hct 29.5 (L) 37.0 - 47.0 %    MCV 98.7 (H) 80.0 - 94.0 fL    MCH 32.1 (H) 27.0 - 31.0 pg    MCHC 32.5 (L) 33.0 - 36.0 mg/dL    RDW 14.6 11.5 - 17.0 %    Platelet 362 130 - 400 x10(3)/mcL    MPV 10.0 7.4 - 10.4 fL    Neut % 52.4 %    Lymph % 33.5 %    Mono % 8.0 %    Eos % 4.4 %    Basophil % 0.5 %    Lymph # 3.16 0.6 - 4.6 x10(3)/mcL    Neut # 4.9 2.1 - 9.2 x10(3)/mcL    Mono # 0.75 0.1 - 1.3 x10(3)/mcL    Eos # 0.41 0 - 0.9 x10(3)/mcL    Baso # 0.05 0 - 0.2 x10(3)/mcL    Imm Gran # 0.11 (H) 0 - 0.04 x10(3)/mcL    Imm Grans % 1.2 %    NRBC% 0.0 %   Prealbumin    Collection Time: 09/05/22  5:41 AM   Result Value Ref Range    Prealbumin 19.5 14.0 - 37.0 mg/dL   Comprehensive Metabolic Panel    Collection Time: 09/05/22  5:41 AM   Result Value Ref Range    Sodium 141 136 - 145 mmol/L    Potassium 5.3 (H) 3.5 - 5.1 mmol/L    Chloride 103 98 - 107 mmol/L    CO2 28 23 - 31 mmol/L    Glucose 112 82 - 115 mg/dL    Blood Urea Nitrogen 9.8 9.8 - 20.1 mg/dL    Creatinine 0.73 0.55 - 1.02 mg/dL    Calcium 10.0 8.4 - 10.2 mg/dL    Protein  Total 7.0 5.8 - 7.6 gm/dL    Albumin 3.1 (L) 3.4 - 4.8 gm/dL    Globulin 3.9 (H) 2.4 - 3.5 gm/dL    Albumin/Globulin Ratio 0.8 (L) 1.1 - 2.0 ratio    Bilirubin Total 0.8 <=1.5 mg/dL     (H) 40 - 150 unit/L    ALT 36 0 - 55 unit/L    AST 34 5 - 34 unit/L    eGFR >60 mls/min/1.73/m2   Magnesium    Collection Time: 09/05/22  5:41 AM   Result Value Ref Range    Magnesium Level 2.40 1.60 - 2.60 mg/dL   Phosphorus    Collection Time: 09/05/22  5:41 AM   Result Value Ref Range    Phosphorus Level 4.0 2.3 - 4.7 mg/dL   CBC with Differential    Collection Time: 09/05/22  5:41 AM   Result Value Ref Range    WBC 8.3 4.5 - 11.5 x10(3)/mcL    RBC 3.28 (L) 4.20 - 5.40 x10(6)/mcL    Hgb 10.8 (L) 12.0 - 16.0 gm/dL    Hct 34.1 (L) 37.0 - 47.0 %    .0 (H) 80.0 - 94.0 fL    MCH 32.9 (H) 27.0 - 31.0 pg    MCHC 31.7 (L) 33.0 - 36.0 mg/dL    RDW 16.6 11.5 - 17.0 %    Platelet 505 (H) 130 - 400 x10(3)/mcL    MPV 10.1 7.4 - 10.4 fL    Neut % 51.7 %    Lymph % 34.9 %    Mono % 7.6 %    Eos % 4.2 %    Basophil % 0.8 %    Lymph # 2.91 0.6 - 4.6 x10(3)/mcL    Neut # 4.3 2.1 - 9.2 x10(3)/mcL    Mono # 0.63 0.1 - 1.3 x10(3)/mcL    Eos # 0.35 0 - 0.9 x10(3)/mcL    Baso # 0.07 0 - 0.2 x10(3)/mcL    Imm Gran # 0.07 (H) 0 - 0.04 x10(3)/mcL    Imm Grans % 0.8 %    NRBC% 0.0 %   Basic Metabolic Panel    Collection Time: 09/06/22  5:10 AM   Result Value Ref Range    Sodium 141 136 - 145 mmol/L    Potassium 4.7 3.5 - 5.1 mmol/L    Chloride 100 98 - 107 mmol/L    CO2 29 23 - 31 mmol/L    Glucose 102 82 - 115 mg/dL    Blood Urea Nitrogen 12.9 9.8 - 20.1 mg/dL    Creatinine 0.71 0.55 - 1.02 mg/dL    BUN/Creatinine Ratio 18     Calcium 10.0 8.4 - 10.2 mg/dL    Anion Gap 12.0 mEq/L    eGFR >60 mls/min/1.73/m2      Assessment:     1. Rib pain on right side        Plan:   Patient is well-appearing appears comfortable sitting in exam chair.  However when she moves to a standing position she reports severe pain underneath her right breast that  radiates underneath her left breast.  Pain is also made worse with twisting motions and bending motions.  She was seen 6 days ago at her primary care where she was diagnosed with musculoskeletal pain and given a Celestone injection and 5 days a oral Toradol.  She said for 3 days after the Celestone injection she felt much better, then the pain returned.  She has been taking the oral Toradol last dose was yesterday, and believes this pain medication is not helping.  Due to her responsive Celestone in the pain being reproducible with movement I do agree that this is musculoskeletal pain.  Chest x-ray is negative.  We will begin oral prednisone, and naproxen.  She will follow up with her primary care this week.  She verbalizes understanding and agrees with plan of care.  ER precautions reviewed.       Chest x-ray is negative  Medication sent to pharmacy. Take medications as prescribed.  Try heat or ice, apply for 10-20 minutes at a time several times a day. Put a thin cloth between the heat or ice and your skin.  Gently rub or massage the area to relieve pain and help with blood flow.  Do not do anything that makes the pain worse.   Begin to slowly do range of motion exercises as tolerated.  Seek immediate medical care if you develop numbness, tingling, unable to move extremities.  You have new or worsening pain or weakness, If you lose bowel or bladder function or if you are not getting better as expected.    Recommend to make a follow up appointment with your primary care within 1 week for re-evaluation  Recommend immediate ER if you develop chest pain, palpitations, dizziness, worsening pain, or feel like you are going to pass out    Rib pain on right side  -     XR CHEST PA AND LATERAL; Future; Expected date: 01/26/2025    Other orders  -     predniSONE (DELTASONE) 20 MG tablet; Take 1 tablet (20 mg total) by mouth 2 (two) times daily. for 5 days  Dispense: 10 tablet; Refill: 0  -     naproxen (NAPROSYN) 500 MG  tablet; Take 1 tablet (500 mg total) by mouth 2 (two) times daily with meals. for 7 days  Dispense: 14 tablet; Refill: 0

## 2025-01-26 NOTE — PATIENT INSTRUCTIONS
Chest x-ray is negative  Medication sent to pharmacy. Take medications as prescribed.  Try heat or ice, apply for 10-20 minutes at a time several times a day. Put a thin cloth between the heat or ice and your skin.  Gently rub or massage the area to relieve pain and help with blood flow.  Do not do anything that makes the pain worse.   Begin to slowly do range of motion exercises as tolerated.  Seek immediate medical care if you develop numbness, tingling, unable to move extremities.  You have new or worsening pain or weakness, If you lose bowel or bladder function or if you are not getting better as expected.    Recommend to make a follow up appointment with your primary care within 1 week for re-evaluation  Recommend immediate ER if you develop chest pain, palpitations, dizziness, worsening pain, or feel like you are going to pass out

## (undated) DEVICE — APPLICATOR CHLORAPREP ORN 26ML

## (undated) DEVICE — Device

## (undated) DEVICE — DRESSING ISLAND TELFA 4X5IN

## (undated) DEVICE — SUT CTD VICRYL CT-1 27

## (undated) DEVICE — DRAPE ORTH SPLIT 77X108IN

## (undated) DEVICE — SPONGE GAUZE 4X4 12 PLY STRL

## (undated) DEVICE — SUT VICRYL BR 1 GEN 27 CT-1

## (undated) DEVICE — GUIDEWIRE W/DRILL TP 3.2X400M
Type: IMPLANTABLE DEVICE | Site: FEMUR | Status: NON-FUNCTIONAL
Removed: 2022-08-24

## (undated) DEVICE — DRESSING ADHESIVE ISLAND 3 X 6

## (undated) DEVICE — GOWN SMARTSLEEVE XXL/XLONG

## (undated) DEVICE — GLOVE PROTEXIS PI CRM 7

## (undated) DEVICE — GLOVE 8 PROTEXIS PI ORTHO

## (undated) DEVICE — GLOVE PROTEXIS BLUE LATEX 7

## (undated) DEVICE — ELECTRODE REM POLYHESIVE II

## (undated) DEVICE — COVER SHOE FLUID RESISTANT XL

## (undated) DEVICE — TAPE SILK 3IN

## (undated) DEVICE — DRAPE STERI U-SHAPED 47X51IN

## (undated) DEVICE — GLOVE PROTEXIS LTX MICRO 8

## (undated) DEVICE — DRESSING XEROFORM 5X9IN

## (undated) DEVICE — DRAPE C-ARM COVER EZ 36X28IN

## (undated) DEVICE — DRAPE C-ARMOR EQUIPMENT COVER

## (undated) DEVICE — BLADE SURG CARBON STEEL #10

## (undated) DEVICE — COVER TABLE HVY DTY 60X90IN

## (undated) DEVICE — DRESSING XEROFORM FOIL PK 1X8

## (undated) DEVICE — STAPLER SKIN PROXIMATE WIDE

## (undated) DEVICE — COVER FULLGUARD SHOE HIGH-TOP

## (undated) DEVICE — BIT DRILL 4.2MM 3 FLUTD 145MM